# Patient Record
Sex: MALE | Race: WHITE | NOT HISPANIC OR LATINO | Employment: UNEMPLOYED | URBAN - METROPOLITAN AREA
[De-identification: names, ages, dates, MRNs, and addresses within clinical notes are randomized per-mention and may not be internally consistent; named-entity substitution may affect disease eponyms.]

---

## 2019-08-20 ENCOUNTER — HOSPITAL ENCOUNTER (EMERGENCY)
Facility: HOSPITAL | Age: 49
Discharge: LEFT BEFORE DISCHARGE | End: 2019-08-20
Attending: EMERGENCY MEDICINE | Admitting: EMERGENCY MEDICINE

## 2019-08-20 ENCOUNTER — APPOINTMENT (OUTPATIENT)
Dept: GENERAL RADIOLOGY | Facility: HOSPITAL | Age: 49
End: 2019-08-20

## 2019-08-20 VITALS
WEIGHT: 220 LBS | DIASTOLIC BLOOD PRESSURE: 83 MMHG | BODY MASS INDEX: 27.35 KG/M2 | TEMPERATURE: 98.7 F | RESPIRATION RATE: 18 BRPM | HEIGHT: 75 IN | SYSTOLIC BLOOD PRESSURE: 139 MMHG | OXYGEN SATURATION: 96 % | HEART RATE: 119 BPM

## 2019-08-20 DIAGNOSIS — M54.40 LOW BACK PAIN WITH SCIATICA, SCIATICA LATERALITY UNSPECIFIED, UNSPECIFIED BACK PAIN LATERALITY, UNSPECIFIED CHRONICITY: Primary | ICD-10-CM

## 2019-08-20 DIAGNOSIS — F10.10 ETOH ABUSE: ICD-10-CM

## 2019-08-20 LAB
ALBUMIN SERPL-MCNC: 5 G/DL (ref 3.5–5.2)
ALBUMIN/GLOB SERPL: 1.9 G/DL
ALP SERPL-CCNC: 91 U/L (ref 39–117)
ALT SERPL W P-5'-P-CCNC: 22 U/L (ref 1–41)
ANION GAP SERPL CALCULATED.3IONS-SCNC: 18 MMOL/L (ref 5–15)
AST SERPL-CCNC: 33 U/L (ref 1–40)
BASOPHILS # BLD AUTO: 0.04 10*3/MM3 (ref 0–0.2)
BASOPHILS NFR BLD AUTO: 0.3 % (ref 0–1.5)
BILIRUB SERPL-MCNC: 1 MG/DL (ref 0.2–1.2)
BUN BLD-MCNC: 15 MG/DL (ref 6–20)
BUN/CREAT SERPL: 15.2 (ref 7–25)
CALCIUM SPEC-SCNC: 9.3 MG/DL (ref 8.6–10.5)
CHLORIDE SERPL-SCNC: 97 MMOL/L (ref 98–107)
CO2 SERPL-SCNC: 25 MMOL/L (ref 22–29)
CREAT BLD-MCNC: 0.99 MG/DL (ref 0.76–1.27)
DEPRECATED RDW RBC AUTO: 38 FL (ref 37–54)
EOSINOPHIL # BLD AUTO: 0.29 10*3/MM3 (ref 0–0.4)
EOSINOPHIL NFR BLD AUTO: 2.5 % (ref 0.3–6.2)
ERYTHROCYTE [DISTWIDTH] IN BLOOD BY AUTOMATED COUNT: 12.1 % (ref 12.3–15.4)
ETHANOL BLD-MCNC: 220 MG/DL (ref 0–10)
GFR SERPL CREATININE-BSD FRML MDRD: 81 ML/MIN/1.73
GLOBULIN UR ELPH-MCNC: 2.7 GM/DL
GLUCOSE BLD-MCNC: 107 MG/DL (ref 65–99)
HCT VFR BLD AUTO: 45.4 % (ref 37.5–51)
HGB BLD-MCNC: 15.8 G/DL (ref 13–17.7)
HOLD SPECIMEN: NORMAL
HOLD SPECIMEN: NORMAL
IMM GRANULOCYTES # BLD AUTO: 0.06 10*3/MM3 (ref 0–0.05)
IMM GRANULOCYTES NFR BLD AUTO: 0.5 % (ref 0–0.5)
LYMPHOCYTES # BLD AUTO: 2.18 10*3/MM3 (ref 0.7–3.1)
LYMPHOCYTES NFR BLD AUTO: 18.7 % (ref 19.6–45.3)
MCH RBC QN AUTO: 30.4 PG (ref 26.6–33)
MCHC RBC AUTO-ENTMCNC: 34.8 G/DL (ref 31.5–35.7)
MCV RBC AUTO: 87.3 FL (ref 79–97)
MONOCYTES # BLD AUTO: 0.81 10*3/MM3 (ref 0.1–0.9)
MONOCYTES NFR BLD AUTO: 7 % (ref 5–12)
NEUTROPHILS # BLD AUTO: 8.25 10*3/MM3 (ref 1.7–7)
NEUTROPHILS NFR BLD AUTO: 71 % (ref 42.7–76)
NRBC BLD AUTO-RTO: 0 /100 WBC (ref 0–0.2)
PLATELET # BLD AUTO: 234 10*3/MM3 (ref 140–450)
PMV BLD AUTO: 9.3 FL (ref 6–12)
POTASSIUM BLD-SCNC: 3.4 MMOL/L (ref 3.5–5.2)
PROT SERPL-MCNC: 7.7 G/DL (ref 6–8.5)
RBC # BLD AUTO: 5.2 10*6/MM3 (ref 4.14–5.8)
SODIUM BLD-SCNC: 140 MMOL/L (ref 136–145)
WBC NRBC COR # BLD: 11.63 10*3/MM3 (ref 3.4–10.8)
WHOLE BLOOD HOLD SPECIMEN: NORMAL
WHOLE BLOOD HOLD SPECIMEN: NORMAL

## 2019-08-20 PROCEDURE — 80053 COMPREHEN METABOLIC PANEL: CPT | Performed by: EMERGENCY MEDICINE

## 2019-08-20 PROCEDURE — 99282 EMERGENCY DEPT VISIT SF MDM: CPT

## 2019-08-20 PROCEDURE — 85025 COMPLETE CBC W/AUTO DIFF WBC: CPT | Performed by: EMERGENCY MEDICINE

## 2019-08-20 PROCEDURE — 99283 EMERGENCY DEPT VISIT LOW MDM: CPT

## 2019-08-20 PROCEDURE — 80307 DRUG TEST PRSMV CHEM ANLYZR: CPT | Performed by: EMERGENCY MEDICINE

## 2019-08-20 PROCEDURE — 96360 HYDRATION IV INFUSION INIT: CPT

## 2019-08-20 RX ORDER — SODIUM CHLORIDE 0.9 % (FLUSH) 0.9 %
10 SYRINGE (ML) INJECTION AS NEEDED
Status: DISCONTINUED | OUTPATIENT
Start: 2019-08-20 | End: 2019-08-20 | Stop reason: HOSPADM

## 2019-08-20 RX ADMIN — SODIUM CHLORIDE 1000 ML: 9 INJECTION, SOLUTION INTRAVENOUS at 19:24

## 2019-08-20 NOTE — ED PROVIDER NOTES
"Subjective   Mr. Leonard Crandall is a 48 y.o. male who presents to the ED with c/o back pain. He reports for the past \"couple weeks\" he has been experiencing increased lower back pain. He states walking initially creates stiffness in his back that eventually turns into sharp pain. He also complains of vomiting 2-3 times yesterday and right lower quadrant abdominal pain he describes as cramping. He denies incontinence, numbness, diarrhea, and changes in urination. He suspects his symptoms are secondary to dehydration and notes drinking water makes him bloated. He has a history of fractured spine. He is a . He smokes and drinks up to 1 pint of alcohol per day. He has been drinking today. He does not use illegal drugs. There are no other acute complaints at this time.        History provided by:  Patient  Back Pain   Location:  Lumbar spine  Quality:  Stabbing  Radiates to:  Does not radiate  Pain severity:  Moderate  Timing:  Constant  Chronicity:  New  Context comment:  Previous fractures  Worsened by:  Ambulation  Associated symptoms: abdominal pain    Associated symptoms: no bladder incontinence, no bowel incontinence, no dysuria and no numbness    Risk factors: not obese        Review of Systems   Gastrointestinal: Positive for abdominal pain and vomiting. Negative for bowel incontinence and diarrhea.   Genitourinary: Negative for bladder incontinence, difficulty urinating, dysuria, frequency and urgency.   Musculoskeletal: Positive for back pain.   Neurological: Negative for numbness.   All other systems reviewed and are negative.      History reviewed. No pertinent past medical history.    No Known Allergies    History reviewed. No pertinent surgical history.    History reviewed. No pertinent family history.    Social History     Socioeconomic History   • Marital status: Single     Spouse name: Not on file   • Number of children: Not on file   • Years of education: Not on file   • Highest " education level: Not on file   Tobacco Use   • Smoking status: Current Every Day Smoker     Packs/day: 1.00     Types: Cigarettes   Substance and Sexual Activity   • Alcohol use: Yes     Comment: 1 PINT LIQUOR/DAY   • Drug use: No         Objective   Physical Exam   Constitutional: He is oriented to person, place, and time. He appears well-developed and well-nourished. He is cooperative.  Non-toxic appearance. No distress.   HENT:   Head: Normocephalic and atraumatic.   Eyes: Conjunctivae, EOM and lids are normal.   Neck: Trachea normal, normal range of motion and full passive range of motion without pain.   Cardiovascular: Regular rhythm, normal heart sounds, intact distal pulses and normal pulses.   Pulmonary/Chest: Effort normal and breath sounds normal. No respiratory distress. He has no decreased breath sounds. He has no wheezes. He has no rhonchi. He has no rales.   Abdominal: Soft. Normal appearance and bowel sounds are normal. He exhibits no distension. There is no tenderness.   Musculoskeletal: Normal range of motion.        Lumbar back: He exhibits tenderness (paraspinal tenderness, no midline tenderness). He exhibits normal range of motion.   Neurological: He is alert and oriented to person, place, and time. He has normal strength. No cranial nerve deficit.   Skin: Skin is warm, dry and intact. No rash noted.   Psychiatric: He has a normal mood and affect. His speech is normal and behavior is normal.   Nursing note and vitals reviewed.      Procedures         ED Course  ED Course as of Aug 22 0500   Tue Aug 20, 2019   2100 Patient advises that he is feeling better after the fluids.  Patient reports he wants to leave at this time.  He tells the nurse that he cannot afford this visit and now does not want the x-rays.  Patient is going leave at this time.  He is going to  some ibuprofen on his way home.  [KG]      ED Course User Index  [KG] Cherelle Starkey, APRN       No results found for this or  "any previous visit (from the past 24 hour(s)).  Note: In addition to lab results from this visit, the labs listed above may include labs taken at another facility or during a different encounter within the last 24 hours. Please correlate lab times with ED admission and discharge times for further clarification of the services performed during this visit.    No orders to display     Vitals:    08/20/19 1720   BP: 139/83   BP Location: Left arm   Patient Position: Sitting   Pulse: 119   Resp: 18   Temp: 98.7 °F (37.1 °C)   TempSrc: Oral   SpO2: 96%   Weight: 99.8 kg (220 lb)   Height: 190.5 cm (75\")     Medications   sodium chloride 0.9 % bolus 1,000 mL (0 mL Intravenous Stopped 8/20/19 2022)     ECG/EMG Results (last 24 hours)     ** No results found for the last 24 hours. **        No orders to display                     MDM      Final diagnoses:   Low back pain with sciatica, sciatica laterality unspecified, unspecified back pain laterality, unspecified chronicity   ETOH abuse       Documentation assistance provided by maged Zaidi.  Information recorded by the maged was done at my direction and has been verified and validated by me.     Jenny Zaidi  08/20/19 1940       Cherelle Starkey APRN  08/22/19 2119    "

## 2022-01-20 ENCOUNTER — HOSPITAL ENCOUNTER (INPATIENT)
Facility: HOSPITAL | Age: 52
LOS: 2 days | Discharge: LEFT BEFORE DISCHARGE | End: 2022-01-22
Attending: EMERGENCY MEDICINE | Admitting: INTERNAL MEDICINE

## 2022-01-20 ENCOUNTER — APPOINTMENT (OUTPATIENT)
Dept: CT IMAGING | Facility: HOSPITAL | Age: 52
End: 2022-01-20

## 2022-01-20 ENCOUNTER — APPOINTMENT (OUTPATIENT)
Dept: GENERAL RADIOLOGY | Facility: HOSPITAL | Age: 52
End: 2022-01-20

## 2022-01-20 DIAGNOSIS — A41.9 SEPSIS WITHOUT ACUTE ORGAN DYSFUNCTION, DUE TO UNSPECIFIED ORGANISM: Primary | ICD-10-CM

## 2022-01-20 DIAGNOSIS — J96.01 ACUTE RESPIRATORY FAILURE WITH HYPOXIA: ICD-10-CM

## 2022-01-20 DIAGNOSIS — R41.82 ALTERED MENTAL STATUS, UNSPECIFIED ALTERED MENTAL STATUS TYPE: ICD-10-CM

## 2022-01-20 DIAGNOSIS — F10.920 ALCOHOLIC INTOXICATION WITHOUT COMPLICATION: ICD-10-CM

## 2022-01-20 PROBLEM — J18.9 SEPSIS DUE TO PNEUMONIA (HCC): Status: ACTIVE | Noted: 2022-01-20

## 2022-01-20 PROBLEM — J44.9 COPD (CHRONIC OBSTRUCTIVE PULMONARY DISEASE) (HCC): Status: ACTIVE | Noted: 2022-01-20

## 2022-01-20 PROBLEM — M54.9 CHRONIC BACK PAIN: Status: ACTIVE | Noted: 2022-01-20

## 2022-01-20 PROBLEM — R74.8 ELEVATED LIVER ENZYMES: Status: ACTIVE | Noted: 2022-01-20

## 2022-01-20 PROBLEM — Z72.0 TOBACCO ABUSE: Status: ACTIVE | Noted: 2022-01-20

## 2022-01-20 PROBLEM — E87.20 LACTIC ACIDOSIS: Status: ACTIVE | Noted: 2022-01-20

## 2022-01-20 PROBLEM — G89.29 CHRONIC BACK PAIN: Status: ACTIVE | Noted: 2022-01-20

## 2022-01-20 PROBLEM — F10.20 ALCOHOLISM: Status: ACTIVE | Noted: 2022-01-20

## 2022-01-20 LAB
ALBUMIN SERPL-MCNC: 4.8 G/DL (ref 3.5–5.2)
ALBUMIN/GLOB SERPL: 1.7 G/DL
ALP SERPL-CCNC: 61 U/L (ref 39–117)
ALT SERPL W P-5'-P-CCNC: 46 U/L (ref 1–41)
ANION GAP SERPL CALCULATED.3IONS-SCNC: 19 MMOL/L (ref 5–15)
APAP SERPL-MCNC: <5 MCG/ML (ref 0–30)
AST SERPL-CCNC: 83 U/L (ref 1–40)
BASOPHILS # BLD AUTO: 0.04 10*3/MM3 (ref 0–0.2)
BASOPHILS NFR BLD AUTO: 0.3 % (ref 0–1.5)
BILIRUB SERPL-MCNC: 0.4 MG/DL (ref 0–1.2)
BUN SERPL-MCNC: 22 MG/DL (ref 6–20)
BUN/CREAT SERPL: 18 (ref 7–25)
CALCIUM SPEC-SCNC: 9 MG/DL (ref 8.6–10.5)
CHLORIDE SERPL-SCNC: 99 MMOL/L (ref 98–107)
CO2 SERPL-SCNC: 23 MMOL/L (ref 22–29)
CREAT SERPL-MCNC: 1.22 MG/DL (ref 0.76–1.27)
D-LACTATE SERPL-SCNC: 3.5 MMOL/L (ref 0.5–2)
D-LACTATE SERPL-SCNC: 3.9 MMOL/L (ref 0.5–2)
DEPRECATED RDW RBC AUTO: 45.4 FL (ref 37–54)
EOSINOPHIL # BLD AUTO: 0.04 10*3/MM3 (ref 0–0.4)
EOSINOPHIL NFR BLD AUTO: 0.3 % (ref 0.3–6.2)
ERYTHROCYTE [DISTWIDTH] IN BLOOD BY AUTOMATED COUNT: 13.1 % (ref 12.3–15.4)
ETHANOL BLD-MCNC: 285 MG/DL (ref 0–10)
FLUAV RNA RESP QL NAA+PROBE: NOT DETECTED
FLUBV RNA RESP QL NAA+PROBE: NOT DETECTED
GFR SERPL CREATININE-BSD FRML MDRD: 63 ML/MIN/1.73
GLOBULIN UR ELPH-MCNC: 2.9 GM/DL
GLUCOSE SERPL-MCNC: 71 MG/DL (ref 65–99)
HAV IGM SERPL QL IA: NORMAL
HBV CORE IGM SERPL QL IA: NORMAL
HBV SURFACE AG SERPL QL IA: NORMAL
HCT VFR BLD AUTO: 51.6 % (ref 37.5–51)
HCV AB SER DONR QL: NORMAL
HGB BLD-MCNC: 17.1 G/DL (ref 13–17.7)
IMM GRANULOCYTES # BLD AUTO: 0.11 10*3/MM3 (ref 0–0.05)
IMM GRANULOCYTES NFR BLD AUTO: 0.8 % (ref 0–0.5)
LYMPHOCYTES # BLD AUTO: 2.91 10*3/MM3 (ref 0.7–3.1)
LYMPHOCYTES NFR BLD AUTO: 20.2 % (ref 19.6–45.3)
MCH RBC QN AUTO: 31.1 PG (ref 26.6–33)
MCHC RBC AUTO-ENTMCNC: 33.1 G/DL (ref 31.5–35.7)
MCV RBC AUTO: 93.8 FL (ref 79–97)
MONOCYTES # BLD AUTO: 1.03 10*3/MM3 (ref 0.1–0.9)
MONOCYTES NFR BLD AUTO: 7.1 % (ref 5–12)
NEUTROPHILS NFR BLD AUTO: 10.31 10*3/MM3 (ref 1.7–7)
NEUTROPHILS NFR BLD AUTO: 71.3 % (ref 42.7–76)
NRBC BLD AUTO-RTO: 0 /100 WBC (ref 0–0.2)
NT-PROBNP SERPL-MCNC: 28 PG/ML (ref 0–450)
PLATELET # BLD AUTO: 274 10*3/MM3 (ref 140–450)
PMV BLD AUTO: 9.3 FL (ref 6–12)
POTASSIUM SERPL-SCNC: 5 MMOL/L (ref 3.5–5.2)
PROCALCITONIN SERPL-MCNC: 0.26 NG/ML (ref 0–0.25)
PROT SERPL-MCNC: 7.7 G/DL (ref 6–8.5)
RBC # BLD AUTO: 5.5 10*6/MM3 (ref 4.14–5.8)
RSV RNA NPH QL NAA+NON-PROBE: NOT DETECTED
SALICYLATES SERPL-MCNC: <0.3 MG/DL
SARS-COV-2 RNA RESP QL NAA+PROBE: NOT DETECTED
SODIUM SERPL-SCNC: 141 MMOL/L (ref 136–145)
TROPONIN T SERPL-MCNC: <0.01 NG/ML (ref 0–0.03)
WBC NRBC COR # BLD: 14.44 10*3/MM3 (ref 3.4–10.8)

## 2022-01-20 PROCEDURE — 36415 COLL VENOUS BLD VENIPUNCTURE: CPT

## 2022-01-20 PROCEDURE — 80053 COMPREHEN METABOLIC PANEL: CPT | Performed by: EMERGENCY MEDICINE

## 2022-01-20 PROCEDURE — 70450 CT HEAD/BRAIN W/O DYE: CPT

## 2022-01-20 PROCEDURE — 83605 ASSAY OF LACTIC ACID: CPT | Performed by: EMERGENCY MEDICINE

## 2022-01-20 PROCEDURE — 99284 EMERGENCY DEPT VISIT MOD MDM: CPT

## 2022-01-20 PROCEDURE — 94640 AIRWAY INHALATION TREATMENT: CPT

## 2022-01-20 PROCEDURE — 71045 X-RAY EXAM CHEST 1 VIEW: CPT

## 2022-01-20 PROCEDURE — 80143 DRUG ASSAY ACETAMINOPHEN: CPT | Performed by: EMERGENCY MEDICINE

## 2022-01-20 PROCEDURE — 84484 ASSAY OF TROPONIN QUANT: CPT | Performed by: EMERGENCY MEDICINE

## 2022-01-20 PROCEDURE — 93005 ELECTROCARDIOGRAM TRACING: CPT | Performed by: EMERGENCY MEDICINE

## 2022-01-20 PROCEDURE — 25010000002 VANCOMYCIN 10 G RECONSTITUTED SOLUTION: Performed by: EMERGENCY MEDICINE

## 2022-01-20 PROCEDURE — 83880 ASSAY OF NATRIURETIC PEPTIDE: CPT | Performed by: EMERGENCY MEDICINE

## 2022-01-20 PROCEDURE — 87040 BLOOD CULTURE FOR BACTERIA: CPT | Performed by: EMERGENCY MEDICINE

## 2022-01-20 PROCEDURE — 25010000002 LORAZEPAM PER 2 MG: Performed by: INTERNAL MEDICINE

## 2022-01-20 PROCEDURE — 80179 DRUG ASSAY SALICYLATE: CPT | Performed by: EMERGENCY MEDICINE

## 2022-01-20 PROCEDURE — 82077 ASSAY SPEC XCP UR&BREATH IA: CPT | Performed by: EMERGENCY MEDICINE

## 2022-01-20 PROCEDURE — 84145 PROCALCITONIN (PCT): CPT | Performed by: EMERGENCY MEDICINE

## 2022-01-20 PROCEDURE — 87637 SARSCOV2&INF A&B&RSV AMP PRB: CPT | Performed by: EMERGENCY MEDICINE

## 2022-01-20 PROCEDURE — 85025 COMPLETE CBC W/AUTO DIFF WBC: CPT | Performed by: EMERGENCY MEDICINE

## 2022-01-20 PROCEDURE — 87076 CULTURE ANAEROBE IDENT EACH: CPT | Performed by: EMERGENCY MEDICINE

## 2022-01-20 PROCEDURE — 94799 UNLISTED PULMONARY SVC/PX: CPT

## 2022-01-20 PROCEDURE — 25010000002 PIPERACILLIN SOD-TAZOBACTAM PER 1 G: Performed by: EMERGENCY MEDICINE

## 2022-01-20 PROCEDURE — 25010000002 THIAMINE PER 100 MG: Performed by: INTERNAL MEDICINE

## 2022-01-20 PROCEDURE — 80074 ACUTE HEPATITIS PANEL: CPT | Performed by: PHYSICIAN ASSISTANT

## 2022-01-20 PROCEDURE — 99223 1ST HOSP IP/OBS HIGH 75: CPT | Performed by: INTERNAL MEDICINE

## 2022-01-20 RX ORDER — LORAZEPAM 1 MG/1
2 TABLET ORAL
Status: DISCONTINUED | OUTPATIENT
Start: 2022-01-20 | End: 2022-01-22 | Stop reason: HOSPADM

## 2022-01-20 RX ORDER — SODIUM CHLORIDE, SODIUM LACTATE, POTASSIUM CHLORIDE, CALCIUM CHLORIDE 600; 310; 30; 20 MG/100ML; MG/100ML; MG/100ML; MG/100ML
100 INJECTION, SOLUTION INTRAVENOUS CONTINUOUS
Status: ACTIVE | OUTPATIENT
Start: 2022-01-20 | End: 2022-01-21

## 2022-01-20 RX ORDER — NICOTINE 21 MG/24HR
1 PATCH, TRANSDERMAL 24 HOURS TRANSDERMAL ONCE
Status: COMPLETED | OUTPATIENT
Start: 2022-01-20 | End: 2022-01-21

## 2022-01-20 RX ORDER — LORAZEPAM 1 MG/1
1 TABLET ORAL
Status: DISCONTINUED | OUTPATIENT
Start: 2022-01-20 | End: 2022-01-22 | Stop reason: HOSPADM

## 2022-01-20 RX ORDER — SODIUM CHLORIDE 0.9 % (FLUSH) 0.9 %
10 SYRINGE (ML) INJECTION AS NEEDED
Status: DISCONTINUED | OUTPATIENT
Start: 2022-01-20 | End: 2022-01-22 | Stop reason: HOSPADM

## 2022-01-20 RX ORDER — SODIUM CHLORIDE 0.9 % (FLUSH) 0.9 %
10 SYRINGE (ML) INJECTION EVERY 12 HOURS SCHEDULED
Status: DISCONTINUED | OUTPATIENT
Start: 2022-01-20 | End: 2022-01-22 | Stop reason: HOSPADM

## 2022-01-20 RX ORDER — LORAZEPAM 2 MG/ML
1 INJECTION INTRAMUSCULAR EVERY 8 HOURS
Status: DISCONTINUED | OUTPATIENT
Start: 2022-01-21 | End: 2022-01-22 | Stop reason: HOSPADM

## 2022-01-20 RX ORDER — NICOTINE 21 MG/24HR
1 PATCH, TRANSDERMAL 24 HOURS TRANSDERMAL EVERY 24 HOURS
Status: DISCONTINUED | OUTPATIENT
Start: 2022-01-21 | End: 2022-01-22 | Stop reason: HOSPADM

## 2022-01-20 RX ORDER — LORAZEPAM 2 MG/ML
2 INJECTION INTRAMUSCULAR
Status: DISCONTINUED | OUTPATIENT
Start: 2022-01-20 | End: 2022-01-22 | Stop reason: HOSPADM

## 2022-01-20 RX ORDER — LORAZEPAM 2 MG/ML
1 INJECTION INTRAMUSCULAR
Status: DISCONTINUED | OUTPATIENT
Start: 2022-01-20 | End: 2022-01-22 | Stop reason: HOSPADM

## 2022-01-20 RX ORDER — LORAZEPAM 2 MG/ML
1 INJECTION INTRAMUSCULAR EVERY 6 HOURS
Status: COMPLETED | OUTPATIENT
Start: 2022-01-20 | End: 2022-01-21

## 2022-01-20 RX ORDER — ALBUTEROL SULFATE 2.5 MG/3ML
2.5 SOLUTION RESPIRATORY (INHALATION)
Status: DISCONTINUED | OUTPATIENT
Start: 2022-01-20 | End: 2022-01-22

## 2022-01-20 RX ORDER — CHOLECALCIFEROL (VITAMIN D3) 125 MCG
5 CAPSULE ORAL NIGHTLY PRN
Status: DISCONTINUED | OUTPATIENT
Start: 2022-01-20 | End: 2022-01-22 | Stop reason: HOSPADM

## 2022-01-20 RX ADMIN — SODIUM CHLORIDE 1000 ML: 9 INJECTION, SOLUTION INTRAVENOUS at 19:54

## 2022-01-20 RX ADMIN — SODIUM CHLORIDE, PRESERVATIVE FREE 10 ML: 5 INJECTION INTRAVENOUS at 21:57

## 2022-01-20 RX ADMIN — TAZOBACTAM SODIUM AND PIPERACILLIN SODIUM 3.38 G: 375; 3 INJECTION, SOLUTION INTRAVENOUS at 19:54

## 2022-01-20 RX ADMIN — FOLIC ACID 100 ML/HR: 5 INJECTION, SOLUTION INTRAMUSCULAR; INTRAVENOUS; SUBCUTANEOUS at 22:51

## 2022-01-20 RX ADMIN — Medication 1 PATCH: at 21:54

## 2022-01-20 RX ADMIN — ALBUTEROL SULFATE 2.5 MG: 2.5 SOLUTION RESPIRATORY (INHALATION) at 23:56

## 2022-01-20 RX ADMIN — LORAZEPAM 1 MG: 2 INJECTION, SOLUTION INTRAMUSCULAR; INTRAVENOUS at 21:54

## 2022-01-20 RX ADMIN — VANCOMYCIN HYDROCHLORIDE 2000 MG: 10 INJECTION, POWDER, LYOPHILIZED, FOR SOLUTION INTRAVENOUS at 20:33

## 2022-01-20 NOTE — ED PROVIDER NOTES
Subjective   Mr. Crandall is brought by EMS from [employer].  The manager there reported that he appeared intoxicated and was loitering.  He reported to EMS that his back pain has been worse than usual lately.  He admitted to them that he was has been drinking alcohol.  On my exam he is very lethargic and is not able to provide any additional history.  I can see indications history record that he has been at [facility] several times over the last couple of years.  He is reported to have alcohol problems, COPD, hypomagnesemia and low back pain.      History provided by:  EMS personnel and medical records  History limited by:  Mental status change  Altered Mental Status  Presenting symptoms: lethargy    Severity:  Severe  Most recent episode:  Today  Episode history:  Single  Timing:  Constant  Progression:  Worsening  Chronicity:  New  Context: alcohol use and homelessness        Review of Systems   Unable to perform ROS: Mental status change       No past medical history on file.    No Known Allergies    No past surgical history on file.    No family history on file.    Social History     Socioeconomic History   • Marital status: Single   Tobacco Use   • Smoking status: Current Every Day Smoker     Packs/day: 1.00     Types: Cigarettes   Substance and Sexual Activity   • Alcohol use: Yes     Comment: 1 PINT LIQUOR/DAY   • Drug use: No           Objective   Physical Exam  Vitals and nursing note reviewed.   Constitutional:       General: He is not in acute distress.     Comments: He is somnolent on my exam.  He does not awaken to verbal stimuli.  He does briefly start with sternal rub.  He mumbles and then quickly goes back to sleep.  He smells of a wood fire.  He is noted to move all extremities at various times during exam but is not able to follow commands.  Saturations are 89% on 2 L of oxygen.   HENT:      Head: Normocephalic and atraumatic.      Nose: Nose normal. No congestion or  rhinorrhea.      Mouth/Throat:      Mouth: Mucous membranes are dry.   Eyes:      General: No scleral icterus.     Conjunctiva/sclera: Conjunctivae normal.   Neck:      Comments: No JVD  Cardiovascular:      Rate and Rhythm: Normal rate and regular rhythm.      Heart sounds: Normal heart sounds. No murmur heard.  No friction rub.   Pulmonary:      Effort: Pulmonary effort is normal.      Breath sounds: Normal breath sounds. No wheezing or rales.   Abdominal:      General: Bowel sounds are normal. There is no distension.      Palpations: Abdomen is soft.      Tenderness: There is no abdominal tenderness.   Musculoskeletal:         General: No tenderness.      Cervical back: Normal range of motion and neck supple.      Right lower leg: No edema.      Left lower leg: No edema.   Skin:     General: Skin is dry.      Capillary Refill: Capillary refill takes less than 2 seconds.      Coloration: Skin is not pale.      Comments: Extremities overall cool to touch   Neurological:      General: No focal deficit present.      Comments: He is unable to follow commands.  He is noted during exam to move all extremities at different times.  For example he is holding the TV remote in his hand   Psychiatric:      Comments: Unable to assess         Procedures           ED Course  ED Course as of 01/20/22 1956   Thu Jan 20, 2022 1803 Staff reports that he is belligerent and combative when I try to check his temperature or do any other interventions.  I do not think he is able to refuse treatment at this point.  We will need to restrain him if necessary to obtain medical evaluation. [DT]   1808 Repeat exam now police are at bedside.  Mr. Crandall is sitting up in bed.  He is able to answer questions.  He denies any cough, fever, or recent upper respiratory illnesses. [DT]   1857 White blood cell count is elevated, lactic acid as well as procalcitonin levels are also elevated.  Will give sepsis antibiotics. [DT]   1858 I have reviewed  his chest x-ray as well as the radiologist interpretation.  I think Mr. Crandall has pneumonia given his low oxygen saturations and elevated white blood cell count and septic markers.  Have ordered IV antibiotics.  COVID swab is negative.  We will plan on admission to the hospital. [DT]   1910 Spoke with Mr. Crandall and advised him of findings.  He does acknowledge that he has been coughing.  He has eaten a dinner tray but remains drowsy. [DT]   1955 D/w Dr Seals who will admit. [DT]      ED Course User Index  [DT] Godfrey Fairbanks MD                                                 MDM  Number of Diagnoses or Management Options  Acute respiratory failure with hypoxia (HCC): new and requires workup  Alcoholic intoxication without complication (HCC): new and does not require workup  Altered mental status, unspecified altered mental status type: new and requires workup  Sepsis without acute organ dysfunction, due to unspecified organism (HCC): new and requires workup     Amount and/or Complexity of Data Reviewed  Clinical lab tests: reviewed and ordered  Tests in the radiology section of CPT®: ordered and reviewed  Obtain history from someone other than the patient: yes  Review and summarize past medical records: yes  Discuss the patient with other providers: yes  Independent visualization of images, tracings, or specimens: yes    Patient Progress  Patient progress: improved      Final diagnoses:   Altered mental status, unspecified altered mental status type   Sepsis without acute organ dysfunction, due to unspecified organism (HCC)   Acute respiratory failure with hypoxia (HCC)   Alcoholic intoxication without complication (HCC)       ED Disposition  ED Disposition     ED Disposition Condition Comment    Decision to Admit  Level of Care: Telemetry [5]   Diagnosis: Sepsis without acute organ dysfunction, due to unspecified organism (HCC) [[id number]]            No follow-up provider specified.       Medication List      No  changes were made to your prescriptions during this visit.          Godfrey Fairbanks MD  01/20/22 1956

## 2022-01-21 PROBLEM — A41.9 SEPSIS WITHOUT ACUTE ORGAN DYSFUNCTION: Status: ACTIVE | Noted: 2022-01-21

## 2022-01-21 LAB
ALBUMIN SERPL-MCNC: 3.9 G/DL (ref 3.5–5.2)
ALBUMIN/GLOB SERPL: 2.3 G/DL
ALP SERPL-CCNC: 49 U/L (ref 39–117)
ALT SERPL W P-5'-P-CCNC: 34 U/L (ref 1–41)
AMPHET+METHAMPHET UR QL: NEGATIVE
AMPHETAMINES UR QL: NEGATIVE
ANION GAP SERPL CALCULATED.3IONS-SCNC: 11 MMOL/L (ref 5–15)
AST SERPL-CCNC: 49 U/L (ref 1–40)
BARBITURATES UR QL SCN: NEGATIVE
BENZODIAZ UR QL SCN: NEGATIVE
BILIRUB SERPL-MCNC: 0.6 MG/DL (ref 0–1.2)
BUN SERPL-MCNC: 21 MG/DL (ref 6–20)
BUN/CREAT SERPL: 24.7 (ref 7–25)
BUPRENORPHINE SERPL-MCNC: NEGATIVE NG/ML
CALCIUM SPEC-SCNC: 8.3 MG/DL (ref 8.6–10.5)
CANNABINOIDS SERPL QL: NEGATIVE
CHLORIDE SERPL-SCNC: 107 MMOL/L (ref 98–107)
CO2 SERPL-SCNC: 23 MMOL/L (ref 22–29)
COCAINE UR QL: NEGATIVE
CREAT SERPL-MCNC: 0.85 MG/DL (ref 0.76–1.27)
DEPRECATED RDW RBC AUTO: 44.8 FL (ref 37–54)
ERYTHROCYTE [DISTWIDTH] IN BLOOD BY AUTOMATED COUNT: 13.1 % (ref 12.3–15.4)
GFR SERPL CREATININE-BSD FRML MDRD: 96 ML/MIN/1.73
GLOBULIN UR ELPH-MCNC: 1.7 GM/DL
GLUCOSE SERPL-MCNC: 98 MG/DL (ref 65–99)
HCT VFR BLD AUTO: 41.9 % (ref 37.5–51)
HGB BLD-MCNC: 14.1 G/DL (ref 13–17.7)
L PNEUMO1 AG UR QL IA: NEGATIVE
MCH RBC QN AUTO: 31.3 PG (ref 26.6–33)
MCHC RBC AUTO-ENTMCNC: 33.7 G/DL (ref 31.5–35.7)
MCV RBC AUTO: 93.1 FL (ref 79–97)
METHADONE UR QL SCN: NEGATIVE
MRSA DNA SPEC QL NAA+PROBE: NEGATIVE
OPIATES UR QL: NEGATIVE
OXYCODONE UR QL SCN: NEGATIVE
PCP UR QL SCN: NEGATIVE
PLATELET # BLD AUTO: 231 10*3/MM3 (ref 140–450)
PMV BLD AUTO: 9.3 FL (ref 6–12)
POTASSIUM SERPL-SCNC: 4.3 MMOL/L (ref 3.5–5.2)
PROPOXYPH UR QL: NEGATIVE
PROT SERPL-MCNC: 5.6 G/DL (ref 6–8.5)
RBC # BLD AUTO: 4.5 10*6/MM3 (ref 4.14–5.8)
S PNEUM AG SPEC QL LA: NEGATIVE
SODIUM SERPL-SCNC: 141 MMOL/L (ref 136–145)
TRICYCLICS UR QL SCN: NEGATIVE
WBC NRBC COR # BLD: 11.17 10*3/MM3 (ref 3.4–10.8)

## 2022-01-21 PROCEDURE — 25010000002 LORAZEPAM PER 2 MG: Performed by: INTERNAL MEDICINE

## 2022-01-21 PROCEDURE — 85027 COMPLETE CBC AUTOMATED: CPT | Performed by: PHYSICIAN ASSISTANT

## 2022-01-21 PROCEDURE — 25010000002 PIPERACILLIN SOD-TAZOBACTAM PER 1 G: Performed by: PHYSICIAN ASSISTANT

## 2022-01-21 PROCEDURE — 94799 UNLISTED PULMONARY SVC/PX: CPT

## 2022-01-21 PROCEDURE — 99232 SBSQ HOSP IP/OBS MODERATE 35: CPT | Performed by: INTERNAL MEDICINE

## 2022-01-21 PROCEDURE — 80306 DRUG TEST PRSMV INSTRMNT: CPT | Performed by: EMERGENCY MEDICINE

## 2022-01-21 PROCEDURE — 80053 COMPREHEN METABOLIC PANEL: CPT | Performed by: PHYSICIAN ASSISTANT

## 2022-01-21 PROCEDURE — 25010000002 ENOXAPARIN PER 10 MG: Performed by: PHYSICIAN ASSISTANT

## 2022-01-21 PROCEDURE — 87899 AGENT NOS ASSAY W/OPTIC: CPT | Performed by: PHYSICIAN ASSISTANT

## 2022-01-21 PROCEDURE — 87641 MR-STAPH DNA AMP PROBE: CPT | Performed by: PHYSICIAN ASSISTANT

## 2022-01-21 PROCEDURE — 25010000002 VANCOMYCIN 10 G RECONSTITUTED SOLUTION

## 2022-01-21 RX ORDER — SODIUM CHLORIDE, SODIUM LACTATE, POTASSIUM CHLORIDE, CALCIUM CHLORIDE 600; 310; 30; 20 MG/100ML; MG/100ML; MG/100ML; MG/100ML
100 INJECTION, SOLUTION INTRAVENOUS CONTINUOUS
Status: ACTIVE | OUTPATIENT
Start: 2022-01-21 | End: 2022-01-22

## 2022-01-21 RX ORDER — FOLIC ACID 1 MG/1
1 TABLET ORAL DAILY
Status: DISCONTINUED | OUTPATIENT
Start: 2022-01-21 | End: 2022-01-22 | Stop reason: HOSPADM

## 2022-01-21 RX ADMIN — LORAZEPAM 1 MG: 2 INJECTION, SOLUTION INTRAMUSCULAR; INTRAVENOUS at 09:34

## 2022-01-21 RX ADMIN — SODIUM CHLORIDE, PRESERVATIVE FREE 10 ML: 5 INJECTION INTRAVENOUS at 20:55

## 2022-01-21 RX ADMIN — TAZOBACTAM SODIUM AND PIPERACILLIN SODIUM 3.38 G: 375; 3 INJECTION, SOLUTION INTRAVENOUS at 09:22

## 2022-01-21 RX ADMIN — Medication 1 PATCH: at 09:22

## 2022-01-21 RX ADMIN — THIAMINE HCL TAB 100 MG 100 MG: 100 TAB at 17:59

## 2022-01-21 RX ADMIN — FOLIC ACID 1 MG: 1 TABLET ORAL at 17:59

## 2022-01-21 RX ADMIN — LORAZEPAM 1 MG: 2 INJECTION INTRAMUSCULAR; INTRAVENOUS at 20:55

## 2022-01-21 RX ADMIN — ALBUTEROL SULFATE 2.5 MG: 2.5 SOLUTION RESPIRATORY (INHALATION) at 16:03

## 2022-01-21 RX ADMIN — SODIUM CHLORIDE, PRESERVATIVE FREE 10 ML: 5 INJECTION INTRAVENOUS at 09:23

## 2022-01-21 RX ADMIN — SODIUM CHLORIDE, POTASSIUM CHLORIDE, SODIUM LACTATE AND CALCIUM CHLORIDE 100 ML/HR: 600; 310; 30; 20 INJECTION, SOLUTION INTRAVENOUS at 15:25

## 2022-01-21 RX ADMIN — LORAZEPAM 1 MG: 2 INJECTION, SOLUTION INTRAMUSCULAR; INTRAVENOUS at 15:25

## 2022-01-21 RX ADMIN — LORAZEPAM 1 MG: 2 INJECTION, SOLUTION INTRAMUSCULAR; INTRAVENOUS at 03:03

## 2022-01-21 RX ADMIN — ENOXAPARIN SODIUM 40 MG: 40 INJECTION SUBCUTANEOUS at 05:02

## 2022-01-21 RX ADMIN — ALBUTEROL SULFATE 2.5 MG: 2.5 SOLUTION RESPIRATORY (INHALATION) at 12:21

## 2022-01-21 RX ADMIN — ALBUTEROL SULFATE 2.5 MG: 2.5 SOLUTION RESPIRATORY (INHALATION) at 07:30

## 2022-01-21 RX ADMIN — ALBUTEROL SULFATE 2.5 MG: 2.5 SOLUTION RESPIRATORY (INHALATION) at 20:36

## 2022-01-21 RX ADMIN — VANCOMYCIN HYDROCHLORIDE 1250 MG: 10 INJECTION, POWDER, LYOPHILIZED, FOR SOLUTION INTRAVENOUS at 05:23

## 2022-01-21 RX ADMIN — TAZOBACTAM SODIUM AND PIPERACILLIN SODIUM 3.38 G: 375; 3 INJECTION, SOLUTION INTRAVENOUS at 02:05

## 2022-01-21 RX ADMIN — TAZOBACTAM SODIUM AND PIPERACILLIN SODIUM 3.38 G: 375; 3 INJECTION, SOLUTION INTRAVENOUS at 17:59

## 2022-01-21 NOTE — H&P
[facility] Medicine Services  HISTORY AND PHYSICAL    Patient Name: Leonard Crandall  : 1970  MRN: [id number]  Primary Care Physician: Provider, No Known  Date of admission: 2022    Subjective   Subjective     Chief Complaint:  AMS/intoxication    HPI:  Leonard Crandall is a 51 y.o. male with a past medical history significant for COPD, chronic back pain, alcoholism, and tobacco abuse. He presents today with AMS likely due to alcohol intoxication. HPI limited secondary to patient disposition. He is disoriented, aggressive, and combative. Currently refusing to awake and answer questions. Threatening staff. Records reviewed indicate EMS was called to a "[employer] station. The manager of the store had a confrontation with the patient about loitering and realized that he was confused and intoxicated. Upon arrival, patient complained to EMS about 10/10 chronic lower back pain and stated that he needed to come to the hospital. Further review of records from the [facility] indicate that patient is seen recurrently in ED related to sequelae of alcohol intoxication.    Currently there are no complaints or reports of fever, cough, congestion, SOB, or chest pain. No headache or focal weakness/parathesias. No falls or head trauma. No abdominal pain or N/V/D. Will admit to inpatient for further evaluation and treatment.      COVID Details:    Symptoms:    [x] NONE [] Fever []  Cough [] Shortness of breath [] Change in taste/smell      Review of Systems   Unable to perform ROS: Mental status change        All other systems reviewed and are negative.     Personal History     Past Medical History:   Diagnosis Date   • Alcoholism ([facility]) 2022   • COPD (chronic obstructive pulmonary disease) ([facility]) 2022   • Tobacco abuse 2022       No past surgical history on file.    Family History: family history is not on file. Otherwise pertinent FHx was reviewed and unremarkable.      Social History:  reports that he has been smoking cigarettes. He has been smoking about 1.00 pack per day. He does not have any smokeless tobacco history on file. He reports current alcohol use. He reports that he does not use drugs.  Social History     Social History Narrative   • Not on file       Medications:       No Known Allergies    Objective   Objective     Vital Signs:   Temp:  [97.4 °F (36.3 °C)-98.3 °F (36.8 °C)] 98.3 °F (36.8 °C)  Heart Rate:  [] 108  Resp:  [16-18] 18  BP: (121-135)/(69-91) 135/83  Flow (L/min):  [3] 3    Physical Exam   Constitutional: obtunded and combative. Easily arousable. disheleved  Eyes: PERRLA, sclerae anicteric, no conjunctival injection  HENT: NCAT, mucous membranes moist  Neck: Supple, no thyromegaly, no lymphadenopathy, trachea midline  Respiratory: Clear to auscultation bilaterally, nonlabored respirations   Cardiovascular: RRR, no murmurs, rubs, or gallops, palpable pedal pulses bilaterally  Gastrointestinal: Positive bowel sounds, soft, nontender, nondistended  Musculoskeletal: No bilateral ankle edema, no clubbing or cyanosis to extremities  Psychiatric: Appropriate affect, cooperative  Neurologic: Oriented x 3, strength symmetric in all extremities, Cranial Nerves grossly intact to confrontation, speech clear  Skin: No rashes      Results Reviewed:  I have personally reviewed most recent indicated data and agree with findings including:  [x]  Laboratory  []  Radiology  []  EKG/Telemetry  []  Pathology  []  Cardiac/Vascular Studies  [x]  Old records  []  Other:  Most pertinent findings include: . Hypoxic to 90% on room air. Troponin negative. ALT 46. AST 83. Lactate 3.9. procalcitonin 0.26. WBC 14.44. Ethanol 285. CXR shows mild bibasilar pneumonia.      LAB RESULTS:      Lab 01/20/22  1812   WBC 14.44*   HEMOGLOBIN 17.1   HEMATOCRIT 51.6*   PLATELETS 274   NEUTROS ABS 10.31*   IMMATURE GRANS (ABS) 0.11*   LYMPHS ABS 2.91   MONOS ABS 1.03*   EOS ABS 0.04    MCV 93.8   PROCALCITONIN 0.26*   LACTATE 3.9*         Lab 01/20/22 1812   SODIUM 141   POTASSIUM 5.0   CHLORIDE 99   CO2 23.0   ANION GAP 19.0*   BUN 22*   CREATININE 1.22   GLUCOSE 71   CALCIUM 9.0         Lab 01/20/22 1812   TOTAL PROTEIN 7.7   ALBUMIN 4.80   GLOBULIN 2.9   ALT (SGPT) 46*   AST (SGOT) 83*   BILIRUBIN 0.4   ALK PHOS 61         Lab 01/20/22 1812   PROBNP 28.0   TROPONIN T <0.010                 Brief Urine Lab Results     None        Microbiology Results (last 10 days)     Procedure Component Value - Date/Time    COVID PRE-OP / PRE-PROCEDURE SCREENING ORDER (NO ISOLATION) - Swab, Nasopharynx [[id number]]  (Normal) Collected: 01/20/22 1817    Lab Status: Final result Specimen: Swab from Nasopharynx Updated: 01/20/22 1907    Narrative:      The following orders were created for panel order COVID PRE-OP / PRE-PROCEDURE SCREENING ORDER (NO ISOLATION) - Swab, Nasopharynx.  Procedure                               Abnormality         Status                     ---------                               -----------         ------                     COVID-19, FLU A/B, RSV P...[[id number]]  Normal              Final result                 Please view results for these tests on the individual orders.    COVID-19, FLU A/B, RSV PCR - Swab, Nasopharynx [[id number]]  (Normal) Collected: 01/20/22 1817    Lab Status: Final result Specimen: Swab from Nasopharynx Updated: 01/20/22 1907     COVID19 Not Detected     Influenza A PCR Not Detected     Influenza B PCR Not Detected     RSV, PCR Not Detected    Narrative:      Fact sheet for providers: https://www.fda.gov/media/040355/download    Fact sheet for patients: https://www.fda.gov/media/995027/download    Test performed by PCR.          CT Head Without Contrast    Result Date: 1/20/2022  CT Head WO HISTORY: Altered mental status and confusion TECHNIQUE: Routine noncontrast head CT. Radiation dose reduction techniques included automated exposure control or exposure  modulation based on body size. Radiation audit for CT and nuclear cardiology exams in the last 12 months: 0. COMPARISON: None. FINDINGS: No acute intracranial hemorrhage, mass lesion, or abnormal extra-axial fluid collection. No midline shift or focal mass effect. Ventricular system is normal in size and configuration. . The gray-white matter differentiation is preserved. Visualized paranasal sinuses are clear. Visualized mastoid air cells are clear. No acute osseous abnormality.     Impression: 1.  No acute intracranial abnormality. Signer Name: DANA QUISPE MD  Signed: 1/20/2022 7:36 PM  Workstation Name: [facility]  [facility]    XR Chest 1 View    Result Date: 1/20/2022  EXAMINATION: XR CHEST 1 VW-  INDICATION: AMS, low sats  COMPARISON: NONE  FINDINGS: Lung volumes are relatively low. There is mild patchy interstitial disease of the lung bases, whether atelectasis or mild changes of pneumonia. The upper and mid lungs are clear. Heart is borderline enlarged. Vasculature appears normal. No pneumothorax or effusion is seen.       Impression: Mild bibasilar atelectasis versus early changes of pneumonia.    This report was finalized on 1/20/2022 6:52 PM by Dr. Baldev Velazco MD.            Assessment/Plan   Assessment & Plan       Sepsis due to pneumonia (HCC)    Lactic acidosis    Elevated liver enzymes    Alcoholism (HCC)    COPD (chronic obstructive pulmonary disease) (HCC)    Chronic back pain    Tobacco abuse    51 yr old gentleman with history of Etoism and frequent visits to  ED, brought to [facility] ED after he was noted to be confused and intoxicated at a [place] gas station.      1. Sepsis due to pneumonia with COPD  Hypoxia - sats 89% in ED, suspect partly chronic  - CXR reviewed; ? R side effusion   - with lactic acidosis  - not on supplemental oxyen  - concern for aspiration.  - obtain blood cultures, sputum cultures, urine antigens, MRSA PCR  - start zosyn and vancomycin  - start  pulmonary toilet  - IVF  - am labs    2. Elevated Liver Enzymes:  - consisted with alcoholism/ alcoholic hepatitis  - check PT INR  - unchanged from labs at [facility] in December 2021  - acute hepatitis panel  - consider US gallbladder    3. Alcoholism:  - records indicate 1 pink liquor daily  - ETOH level 285  - [facility] protocol  - seizure precautions  - vitamin replacement  - addiction management    4. Tobacco abuse:  - nicotine patch as needed. Smokes 1 PPD. Plan to discuss cessation      DVT prophylaxis:  lovenox    CODE STATUS:  Full code  Level Of Support Discussed With: Patient  Code Status (Patient has no pulse and is not breathing): CPR (Attempt to Resuscitate)  Medical Interventions (Patient has pulse or is breathing): Full Support      This note has been completed as part of a split-shared workflow.     Electronically signed by Jm Cristina PA-C, 01/20/22, 9:35 PM EST.          Attending   Admission Attestation       I have seen and examined the patient, performing an independent face-to-face diagnostic evaluation with plan of care reviewed and developed with the advanced practice clinician (APC).      Brief Summary Statement:   Leonard Crandall is a 51 y.o. male with history as above.  Seen after reaching his room, he is sleeping, snoring comfortably, and NAD.  Given his recent combativeness and agitation in ED, I did not attempt to wake him .    Remainder of detailed HPI is as noted by APC and has been reviewed and/or edited by me for completeness.    Attending Physical Exam:    Constitutional: Asleep in bed, did not wake to voice or gentle exam.   HENT: NCAT,  Neck: Supple, trachea midline  Respiratory: snoring while asleep, no tachypnea, nonlabored.    Cardiovascular: mildly tachy RR, no murmur  Gastrointestinal: Positive bowel sounds, soft, nontender, nondistended  Musculoskeletal: No bilateral ankle edema, no clubbing or cyanosis to extremities  Psychiatric: cannot assess  Neurologic:  No restlessness, no  abnl movements, no tremor.  Asleep.   Skin: warm and dry     Brief Assessment/Plan :  See detailed assessment and plan developed with APC which I have reviewed and/or edited for completeness.        Admission Status: I believe that this patient meets OBSERVATION status, however if further evaluation or treatment plans warrant, status may change.  Based upon current information, I predict patient's care encounter to be less than or equal to 2 midnights.        Fátima Seals MD  01/20/22

## 2022-01-21 NOTE — CASE MANAGEMENT/SOCIAL WORK
Continued Stay Note  [facility]     Patient Name: Leonard Crandall  MRN: [id number]  Today's Date: 1/21/2022    Admit Date: 1/20/2022     Discharge Plan     Row Name 01/21/22 1455       Plan    Plan Ongoing    Plan Comments Met briefly with the patient today.  His admission BAL= 285.  CIWA range thus far- 5-7.  Pt not overly interactive, difficult to engage at this time.  A packet of AUD treatment resources was left for him at the bedside.  A this juncture, it appears highly unlikely that this patient will be willing to participate in AUD treatment.  Will follow.               Discharge Codes    No documentation.                     Emy Covarrubias RN ,BSN   Addiction Coordinator

## 2022-01-21 NOTE — PROGRESS NOTES
[facility] Medicine Services  PROGRESS NOTE    Patient Name: Leonard Crandall  : 1970  MRN: [id number]    Date of Admission: 2022  Primary Care Physician: Provider, No Known    Subjective   Subjective     CC:  F/U pneumonia    HPI:  Feels rough today.  Hurts all over.  Coughing, short of breath.  No tremors yet.    ROS:  Gen- No fevers, +chills  Resp- + cough, dyspnea  GI- + nausea and abd pain      Objective   Objective     Vital Signs:   Temp:  [97.2 °F (36.2 °C)-99.4 °F (37.4 °C)] 98.6 °F (37 °C)  Heart Rate:  [] 107  Resp:  [16-18] 18  BP: (100-143)/(51-91) 143/75  Flow (L/min):  [3] 3     Physical Exam:  Constitutional: No acute distress, awake, alert, laying in bed  HENT: NCAT, mucous membranes moist  Respiratory: Clear to auscultation bilaterally, respiratory effort normal   Cardiovascular: RRR, no murmurs, rubs, or gallops  Gastrointestinal: Positive bowel sounds, soft, nontender, nondistended  Musculoskeletal: No bilateral ankle edema  Psychiatric: Appropriate affect, cooperative  Neurologic: Cranial Nerves grossly intact to confrontation, speech clear  Skin: No rashes on exposed surfaces    Results Reviewed:  LAB RESULTS:      Lab 22  0706 22  2205 22  1812   WBC 11.17*  --  14.44*   HEMOGLOBIN 14.1  --  17.1   HEMATOCRIT 41.9  --  51.6*   PLATELETS 231  --  274   NEUTROS ABS  --   --  10.31*   IMMATURE GRANS (ABS)  --   --  0.11*   LYMPHS ABS  --   --  2.91   MONOS ABS  --   --  1.03*   EOS ABS  --   --  0.04   MCV 93.1  --  93.8   PROCALCITONIN  --   --  0.26*   LACTATE  --  3.5* 3.9*         Lab 22  0706 22  1812   SODIUM 141 141   POTASSIUM 4.3 5.0   CHLORIDE 107 99   CO2 23.0 23.0   ANION GAP 11.0 19.0*   BUN 21* 22*   CREATININE 0.85 1.22   GLUCOSE 98 71   CALCIUM 8.3* 9.0         Lab 22  0706 22  1812   TOTAL PROTEIN 5.6* 7.7   ALBUMIN 3.90 4.80   GLOBULIN 1.7 2.9   ALT (SGPT) 34 46*   AST (SGOT) 49* 83*    BILIRUBIN 0.6 0.4   ALK PHOS 49 61         Lab 01/20/22 1812   PROBNP 28.0   TROPONIN T <0.010                 Brief Urine Lab Results     None          Microbiology Results Abnormal     Procedure Component Value - Date/Time    S. Pneumo Ag Urine or CSF - Urine, Urine, Clean Catch [[id number]]  (Normal) Collected: 01/21/22 0020    Lab Status: Final result Specimen: Urine, Clean Catch Updated: 01/21/22 0950     Strep Pneumo Ag Negative    Legionella Antigen, Urine - Urine, Urine, Clean Catch [[id number]]  (Normal) Collected: 01/21/22 0020    Lab Status: Final result Specimen: Urine, Clean Catch Updated: 01/21/22 0950     LEGIONELLA ANTIGEN, URINE Negative    COVID PRE-OP / PRE-PROCEDURE SCREENING ORDER (NO ISOLATION) - Swab, Nasopharynx [[id number]]  (Normal) Collected: 01/20/22 1817    Lab Status: Final result Specimen: Swab from Nasopharynx Updated: 01/20/22 1907    Narrative:      The following orders were created for panel order COVID PRE-OP / PRE-PROCEDURE SCREENING ORDER (NO ISOLATION) - Swab, Nasopharynx.  Procedure                               Abnormality         Status                     ---------                               -----------         ------                     COVID-19, FLU A/B, RSV P...[[id number]]  Normal              Final result                 Please view results for these tests on the individual orders.    COVID-19, FLU A/B, RSV PCR - Swab, Nasopharynx [[id number]]  (Normal) Collected: 01/20/22 1817    Lab Status: Final result Specimen: Swab from Nasopharynx Updated: 01/20/22 1907     COVID19 Not Detected     Influenza A PCR Not Detected     Influenza B PCR Not Detected     RSV, PCR Not Detected    Narrative:      Fact sheet for providers: https://www.fda.gov/media/507130/download    Fact sheet for patients: https://www.fda.gov/media/123494/download    Test performed by PCR.          CT Head Without Contrast    Result Date: 1/20/2022  CT Head WO HISTORY: Altered mental status and confusion  TECHNIQUE: Routine noncontrast head CT. Radiation dose reduction techniques included automated exposure control or exposure modulation based on body size. Radiation audit for CT and nuclear cardiology exams in the last 12 months: 0. COMPARISON: None. FINDINGS: No acute intracranial hemorrhage, mass lesion, or abnormal extra-axial fluid collection. No midline shift or focal mass effect. Ventricular system is normal in size and configuration. . The gray-white matter differentiation is preserved. Visualized paranasal sinuses are clear. Visualized mastoid air cells are clear. No acute osseous abnormality.     Impression: 1.  No acute intracranial abnormality. Signer Name: DANA QUISPE MD  Signed: 1/20/2022 7:36 PM  Workstation Name: [email or web address]    XR Chest 1 View    Result Date: 1/20/2022  EXAMINATION: XR CHEST 1 VW-  INDICATION: AMS, low sats  COMPARISON: NONE  FINDINGS: Lung volumes are relatively low. There is mild patchy interstitial disease of the lung bases, whether atelectasis or mild changes of pneumonia. The upper and mid lungs are clear. Heart is borderline enlarged. Vasculature appears normal. No pneumothorax or effusion is seen.       Impression: Mild bibasilar atelectasis versus early changes of pneumonia.    This report was finalized on 1/20/2022 6:52 PM by Dr. Baldev Velazco MD.            I have reviewed the medications:  Scheduled Meds:[START ON 1/22/2022] Pharmacy Consult, , Does not apply, Once  albuterol, 2.5 mg, Nebulization, Q4H - RT  enoxaparin, 40 mg, Subcutaneous, Q24H  LORazepam, 1 mg, Intravenous, Q6H   Followed by  LORazepam, 1 mg, Intravenous, Q8H  nicotine, 1 patch, Transdermal, Q24H  nicotine, 1 patch, Transdermal, Once  piperacillin-tazobactam, 3.375 g, Intravenous, Q8H  sodium chloride, 10 mL, Intravenous, Q12H  IV Fluids 1000 mL + additives, 100 mL/hr, Intravenous, Daily  vancomycin, 1,250 mg, Intravenous, Q12H      Continuous Infusions:Pharmacy to dose  vancomycin,       PRN Meds:.LORazepam **OR** LORazepam **OR** LORazepam **OR** LORazepam **OR** LORazepam **OR** LORazepam  •  melatonin  •  Pharmacy to dose vancomycin  •  sodium chloride    Assessment/Plan   Assessment & Plan     Active Hospital Problems    Diagnosis  POA   • **Sepsis due to pneumonia (HCC) [J18.9, A41.9]  Yes   • Sepsis without acute organ dysfunction (HCC) [A41.9]  Yes   • Tobacco abuse [Z72.0]  Yes   • Lactic acidosis [E87.2]  Yes   • Alcoholism (HCC) [F10.20]  Yes   • COPD (chronic obstructive pulmonary disease) (HCC) [J44.9]  Yes   • Elevated liver enzymes [R74.8]  Yes   • Chronic back pain [M54.9, G89.29]  Yes      Resolved Hospital Problems   No resolved problems to display.        Brief Hospital Course to date:  Leonard Crandall is a 51 y.o. male with alcoholism and frequent visits to  ED, brought to [facility] ED after he was noted to be confused and intoxicated at a [employer] gas station.      This patient's problems and plans were partially entered by my partner and updated as appropriate by me 01/21/22.     Sepsis due to pneumonia with COPD, concern for aspiration pneumonia  -Blood cultures, sputum cultures pending, urine antigens negative, MRSA PCR pending  -Continue Zosyn and d/c vancomycin     Elevated Liver Enzymes  -Mild, likely due to alcohol use     Alcoholism  -1 pint liquor daily  -Continue CIWA protocol  -Chemical dependency consult     Tobacco abuse     DVT prophylaxis:  Medical DVT prophylaxis orders are present.            Disposition: I expect the patient to be discharged TBD.    CODE STATUS:   Code Status and Medical Interventions:   Ordered at: 01/20/22 2043     Level Of Support Discussed With:    Patient     Code Status (Patient has no pulse and is not breathing):    CPR (Attempt to Resuscitate)     Medical Interventions (Patient has pulse or is breathing):    Full Support       Jenny Ceballos MD  01/21/22

## 2022-01-21 NOTE — CASE MANAGEMENT/SOCIAL WORK
Continued Stay Note  [facility]     Patient Name: Leonard Crandall  MRN: [id number]  Today's Date: 1/21/2022    Admit Date: 1/20/2022     Discharge Plan     Row Name 01/21/22 0552       Plan    Plan Comments MSW spoke with pt at bedside. Pt reports he has been staying with friends as able, however is likely going to need to go to a homeless shelter at discharge. MSW called the [facility] [phone]. Pt is not restricted from there so he should be able to go there when he is ready for discharge, however at this current time, the [facility] is on an admitting hold due to Covid right now but she reports this could change next week. MSW remains available.    Row Name 01/21/22 8700                                Discharge Codes    No documentation.                     ZEESHAN Chou

## 2022-01-21 NOTE — PAYOR COMM NOTE
"Alma Brothers, RN Utilization Review [phone]  Fax # [phone]           Leonard Crandall (51 y.o. Male)             Date of Birth Social Security Number Address Home Phone MRN    1970  [facility] [place] [address] [phone] [id number]    Voodoo Marital Status             None Single       Admission Date Admission Type Admitting Provider Attending Provider Department, Room/Bed    22 Emergency Jenny Ceballos MD Brown, Hannah, MD [facility] 3H, S383/1    Discharge Date Discharge Disposition Discharge Destination                         Attending Provider: Jenny Ceballos MD    Allergies: No Known Allergies    Isolation: None   Infection: None   Code Status: CPR   Advance Care Planning Activity    Ht: 190.5 cm (75\")   Wt: 118 kg (260 lb)    Admission Cmt: None   Principal Problem: Sepsis due to pneumonia (HCC) [J18.9,A41.9]                 Active Insurance as of 2022     Primary Coverage     Payor Plan Insurance Group Employer/Plan Group    ANTHEM MEDICAID ANTHEM MEDICAID [id number]     Payor Plan Address Payor Plan Phone Number Payor Plan Fax Number Effective Dates    [address] [address] [phone]  2021 - None Entered    [facility] [id number]       Subscriber Name Subscriber Birth Date Member ID       LEONARD CRANDALL 1970 [id number]                 Emergency Contacts      (Rel.) Home Phone Work Phone Mobile Phone    LEYDA MOHAN (Friend) [phone] -- [phone]               History & Physical      Fátima Seals MD at [facility]              [facility] Medicine Services  HISTORY AND PHYSICAL    Patient Name: Leonard Crandall  : 1970  MRN: [id number]  Primary Care Physician: Provider, No Known  Date of admission: 2022    Subjective   Subjective     Chief Complaint:  AMS/intoxication    HPI:  Leonard Crandall is a 51 y.o. male with a past medical history significant for COPD, " chronic back pain, alcoholism, and tobacco abuse. He presents today with AMS likely due to alcohol intoxication. HPI limited secondary to patient disposition. He is disoriented, aggressive, and combative. Currently refusing to awake and answer questions. Threatening staff. Records reviewed indicate EMS was called to a [employer] station. The manager of the store had a confrontation with the patient about loitering and realized that he was confused and intoxicated. Upon arrival, patient complained to EMS about 10/10 chronic lower back pain and stated that he needed to come to the hospital. Further review of records from the [facility] indicate that patient is seen recurrently in ED related to sequelae of alcohol intoxication.    Currently there are no complaints or reports of fever, cough, congestion, SOB, or chest pain. No headache or focal weakness/parathesias. No falls or head trauma. No abdominal pain or N/V/D. Will admit to inpatient for further evaluation and treatment.      COVID Details:    Symptoms:    [x] NONE [] Fever []  Cough [] Shortness of breath [] Change in taste/smell      Review of Systems   Unable to perform ROS: Mental status change        All other systems reviewed and are negative.     Personal History     Past Medical History:   Diagnosis Date   • Alcoholism (HCC) 1/20/2022   • COPD (chronic obstructive pulmonary disease) ([facility]) 1/20/2022   • Tobacco abuse 1/20/2022       No past surgical history on file.    Family History: family history is not on file. Otherwise pertinent FHx was reviewed and unremarkable.     Social History:  reports that he has been smoking cigarettes. He has been smoking about 1.00 pack per day. He does not have any smokeless tobacco history on file. He reports current alcohol use. He reports that he does not use drugs.  Social History     Social History Narrative   • Not on file       Medications:       No Known Allergies    Objective   Objective     Vital  Signs:   Temp:  [97.4 °F (36.3 °C)-98.3 °F (36.8 °C)] 98.3 °F (36.8 °C)  Heart Rate:  [] 108  Resp:  [16-18] 18  BP: (121-135)/(69-91) 135/83  Flow (L/min):  [3] 3    Physical Exam   Constitutional: obtunded and combative. Easily arousable. disheleved  Eyes: PERRLA, sclerae anicteric, no conjunctival injection  HENT: NCAT, mucous membranes moist  Neck: Supple, no thyromegaly, no lymphadenopathy, trachea midline  Respiratory: Clear to auscultation bilaterally, nonlabored respirations   Cardiovascular: RRR, no murmurs, rubs, or gallops, palpable pedal pulses bilaterally  Gastrointestinal: Positive bowel sounds, soft, nontender, nondistended  Musculoskeletal: No bilateral ankle edema, no clubbing or cyanosis to extremities  Psychiatric: Appropriate affect, cooperative  Neurologic: Oriented x 3, strength symmetric in all extremities, Cranial Nerves grossly intact to confrontation, speech clear  Skin: No rashes      Results Reviewed:  I have personally reviewed most recent indicated data and agree with findings including:  [x]  Laboratory  []  Radiology  []  EKG/Telemetry  []  Pathology  []  Cardiac/Vascular Studies  [x]  Old records  []  Other:  Most pertinent findings include: . Hypoxic to 90% on room air. Troponin negative. ALT 46. AST 83. Lactate 3.9. procalcitonin 0.26. WBC 14.44. Ethanol 285. CXR shows mild bibasilar pneumonia.      LAB RESULTS:      Lab 01/20/22 1812   WBC 14.44*   HEMOGLOBIN 17.1   HEMATOCRIT 51.6*   PLATELETS 274   NEUTROS ABS 10.31*   IMMATURE GRANS (ABS) 0.11*   LYMPHS ABS 2.91   MONOS ABS 1.03*   EOS ABS 0.04   MCV 93.8   PROCALCITONIN 0.26*   LACTATE 3.9*         Lab 01/20/22 1812   SODIUM 141   POTASSIUM 5.0   CHLORIDE 99   CO2 23.0   ANION GAP 19.0*   BUN 22*   CREATININE 1.22   GLUCOSE 71   CALCIUM 9.0         Lab 01/20/22 1812   TOTAL PROTEIN 7.7   ALBUMIN 4.80   GLOBULIN 2.9   ALT (SGPT) 46*   AST (SGOT) 83*   BILIRUBIN 0.4   ALK PHOS 61         Lab 01/20/22  1064    PROBNP 28.0   TROPONIN T <0.010                 Brief Urine Lab Results     None        Microbiology Results (last 10 days)     Procedure Component Value - Date/Time    COVID PRE-OP / PRE-PROCEDURE SCREENING ORDER (NO ISOLATION) - Swab, Nasopharynx [[id number]]  (Normal) Collected: 01/20/22 1817    Lab Status: Final result Specimen: Swab from Nasopharynx Updated: 01/20/22 1907    Narrative:      The following orders were created for panel order COVID PRE-OP / PRE-PROCEDURE SCREENING ORDER (NO ISOLATION) - Swab, Nasopharynx.  Procedure                               Abnormality         Status                     ---------                               -----------         ------                     COVID-19, FLU A/B, RSV P...[[id number]]  Normal              Final result                 Please view results for these tests on the individual orders.    COVID-19, FLU A/B, RSV PCR - Swab, Nasopharynx [[id number]]  (Normal) Collected: 01/20/22 1817    Lab Status: Final result Specimen: Swab from Nasopharynx Updated: 01/20/22 1907     COVID19 Not Detected     Influenza A PCR Not Detected     Influenza B PCR Not Detected     RSV, PCR Not Detected    Narrative:      Fact sheet for providers: https://www.fda.gov/media/401087/download    Fact sheet for patients: https://www.fda.gov/media/947799/download    Test performed by PCR.          CT Head Without Contrast    Result Date: 1/20/2022  CT Head WO HISTORY: Altered mental status and confusion TECHNIQUE: Routine noncontrast head CT. Radiation dose reduction techniques included automated exposure control or exposure modulation based on body size. Radiation audit for CT and nuclear cardiology exams in the last 12 months: 0. COMPARISON: None. FINDINGS: No acute intracranial hemorrhage, mass lesion, or abnormal extra-axial fluid collection. No midline shift or focal mass effect. Ventricular system is normal in size and configuration. . The gray-white matter differentiation is  preserved. Visualized paranasal sinuses are clear. Visualized mastoid air cells are clear. No acute osseous abnormality.     Impression: 1.  No acute intracranial abnormality. Signer Name: DANA QUISPE MD  Signed: 1/20/2022 7:36 PM  Workstation Name: [id number]  [facility]    XR Chest 1 View    Result Date: 1/20/2022  EXAMINATION: XR CHEST 1 VW-  INDICATION: AMS, low sats  COMPARISON: NONE  FINDINGS: Lung volumes are relatively low. There is mild patchy interstitial disease of the lung bases, whether atelectasis or mild changes of pneumonia. The upper and mid lungs are clear. Heart is borderline enlarged. Vasculature appears normal. No pneumothorax or effusion is seen.       Impression: Mild bibasilar atelectasis versus early changes of pneumonia.    This report was finalized on 1/20/2022 6:52 PM by Dr. Baldev Velazco MD.            Assessment/Plan   Assessment & Plan       Sepsis due to pneumonia (HCC)    Lactic acidosis    Elevated liver enzymes    Alcoholism (HCC)    COPD (chronic obstructive pulmonary disease) (HCC)    Chronic back pain    Tobacco abuse    51 yr old gentleman with history of Etoism and frequent visits to  ED, brought to [facility] ED after he was noted to be confused and intoxicated at a [employer] gas station.      1. Sepsis due to pneumonia with COPD  Hypoxia - sats 89% in ED, suspect partly chronic  - CXR reviewed; ? R side effusion   - with lactic acidosis  - not on supplemental oxyen  - concern for aspiration.  - obtain blood cultures, sputum cultures, urine antigens, MRSA PCR  - start zosyn and vancomycin  - start pulmonary toilet  - IVF  - am labs    2. Elevated Liver Enzymes:  - consisted with alcoholism/ alcoholic hepatitis  - check PT INR  - unchanged from labs at  in December 2021  - acute hepatitis panel  - consider US gallbladder    3. Alcoholism:  - records indicate 1 pink liquor daily  - ETOH level 285  - CIWA protocol  - seizure precautions  - vitamin  replacement  - addiction management    4. Tobacco abuse:  - nicotine patch as needed. Smokes 1 PPD. Plan to discuss cessation      DVT prophylaxis:  lovenox    CODE STATUS:  Full code  Level Of Support Discussed With: Patient  Code Status (Patient has no pulse and is not breathing): CPR (Attempt to Resuscitate)  Medical Interventions (Patient has pulse or is breathing): Full Support      This note has been completed as part of a split-shared workflow.     Electronically signed by Jm Cristina PA-C, 01/20/22, 9:35 PM EST.          Attending   Admission Attestation       I have seen and examined the patient, performing an independent face-to-face diagnostic evaluation with plan of care reviewed and developed with the advanced practice clinician (APC).      Brief Summary Statement:   Leonard Crandall is a 51 y.o. male with history as above.  Seen after reaching his room, he is sleeping, snoring comfortably, and NAD.  Given his recent combativeness and agitation in ED, I did not attempt to wake him .    Remainder of detailed HPI is as noted by APC and has been reviewed and/or edited by me for completeness.    Attending Physical Exam:    Constitutional: Asleep in bed, did not wake to voice or gentle exam.   HENT: NCAT,  Neck: Supple, trachea midline  Respiratory: snoring while asleep, no tachypnea, nonlabored.    Cardiovascular: mildly tachy RR, no murmur  Gastrointestinal: Positive bowel sounds, soft, nontender, nondistended  Musculoskeletal: No bilateral ankle edema, no clubbing or cyanosis to extremities  Psychiatric: cannot assess  Neurologic:  No restlessness, no abnl movements, no tremor.  Asleep.   Skin: warm and dry     Brief Assessment/Plan :  See detailed assessment and plan developed with APC which I have reviewed and/or edited for completeness.        Admission Status: I believe that this patient meets OBSERVATION status, however if further evaluation or treatment plans warrant, status may change.  Based  upon current information, I predict patient's care encounter to be less than or equal to 2 midnights.        Fátima Seals MD  01/20/22                        Electronically signed by Fátima Seals MD at 01/20/22 2204          Emergency Department Notes      Godfrey Fairbanks MD at 01/20/22 1744          Subjective   Mr. Crandall is brought by EMS from [employer].  The manager there reported that he appeared intoxicated and was loitering.  He reported to EMS that his back pain has been worse than usual lately.  He admitted to them that he was has been drinking alcohol.  On my exam he is very lethargic and is not able to provide any additional history.  I can see indications history record that he has been at [facility] several times over the last couple of years.  He is reported to have alcohol problems, COPD, hypomagnesemia and low back pain.      History provided by:  EMS personnel and medical records  History limited by:  Mental status change  Altered Mental Status  Presenting symptoms: lethargy    Severity:  Severe  Most recent episode:  Today  Episode history:  Single  Timing:  Constant  Progression:  Worsening  Chronicity:  New  Context: alcohol use and homelessness        Review of Systems   Unable to perform ROS: Mental status change       No past medical history on file.    No Known Allergies    No past surgical history on file.    No family history on file.    Social History     Socioeconomic History   • Marital status: Single   Tobacco Use   • Smoking status: Current Every Day Smoker     Packs/day: 1.00     Types: Cigarettes   Substance and Sexual Activity   • Alcohol use: Yes     Comment: 1 PINT LIQUOR/DAY   • Drug use: No           Objective   Physical Exam  Vitals and nursing note reviewed.   Constitutional:       General: He is not in acute distress.     Comments: He is somnolent on my exam.  He does not awaken to verbal stimuli.  He does briefly start with sternal rub.  He  mumbles and then quickly goes back to sleep.  He smells of a wood fire.  He is noted to move all extremities at various times during exam but is not able to follow commands.  Saturations are 89% on 2 L of oxygen.   HENT:      Head: Normocephalic and atraumatic.      Nose: Nose normal. No congestion or rhinorrhea.      Mouth/Throat:      Mouth: Mucous membranes are dry.   Eyes:      General: No scleral icterus.     Conjunctiva/sclera: Conjunctivae normal.   Neck:      Comments: No JVD  Cardiovascular:      Rate and Rhythm: Normal rate and regular rhythm.      Heart sounds: Normal heart sounds. No murmur heard.  No friction rub.   Pulmonary:      Effort: Pulmonary effort is normal.      Breath sounds: Normal breath sounds. No wheezing or rales.   Abdominal:      General: Bowel sounds are normal. There is no distension.      Palpations: Abdomen is soft.      Tenderness: There is no abdominal tenderness.   Musculoskeletal:         General: No tenderness.      Cervical back: Normal range of motion and neck supple.      Right lower leg: No edema.      Left lower leg: No edema.   Skin:     General: Skin is dry.      Capillary Refill: Capillary refill takes less than 2 seconds.      Coloration: Skin is not pale.      Comments: Extremities overall cool to touch   Neurological:      General: No focal deficit present.      Comments: He is unable to follow commands.  He is noted during exam to move all extremities at different times.  For example he is holding the TV remote in his hand   Psychiatric:      Comments: Unable to assess         Procedures          ED Course  ED Course as of 01/20/22 1956   Thu Jan 20, 2022   1803 Staff reports that he is belligerent and combative when I try to check his temperature or do any other interventions.  I do not think he is able to refuse treatment at this point.  We will need to restrain him if necessary to obtain medical evaluation. [DT]   1808 Repeat exam now police are at bedside.    Raz is sitting up in bed.  He is able to answer questions.  He denies any cough, fever, or recent upper respiratory illnesses. [DT]   1857 White blood cell count is elevated, lactic acid as well as procalcitonin levels are also elevated.  Will give sepsis antibiotics. [DT]   1858 I have reviewed his chest x-ray as well as the radiologist interpretation.  I think Mr. Crandall has pneumonia given his low oxygen saturations and elevated white blood cell count and septic markers.  Have ordered IV antibiotics.  COVID swab is negative.  We will plan on admission to the hospital. [DT]   1910 Spoke with Mr. Crandall and advised him of findings.  He does acknowledge that he has been coughing.  He has eaten a dinner tray but remains drowsy. [DT]   1955 D/w Dr Seals who will admit. [DT]      ED Course User Index  [DT] Godfrey Fairbanks MD                                                 MDM  Number of Diagnoses or Management Options  Acute respiratory failure with hypoxia (HCC): new and requires workup  Alcoholic intoxication without complication (HCC): new and does not require workup  Altered mental status, unspecified altered mental status type: new and requires workup  Sepsis without acute organ dysfunction, due to unspecified organism (HCC): new and requires workup     Amount and/or Complexity of Data Reviewed  Clinical lab tests: reviewed and ordered  Tests in the radiology section of CPT®: ordered and reviewed  Obtain history from someone other than the patient: yes  Review and summarize past medical records: yes  Discuss the patient with other providers: yes  Independent visualization of images, tracings, or specimens: yes    Patient Progress  Patient progress: improved      Final diagnoses:   Altered mental status, unspecified altered mental status type   Sepsis without acute organ dysfunction, due to unspecified organism (HCC)   Acute respiratory failure with hypoxia (HCC)   Alcoholic intoxication without complication  ([facility])       ED Disposition  ED Disposition     ED Disposition Condition Comment    Decision to Admit  Level of Care: Telemetry [5]   Diagnosis: Sepsis without acute organ dysfunction, due to unspecified organism ([facility]) [[id number]]            No follow-up provider specified.       Medication List      No changes were made to your prescriptions during this visit.          Godfrey Fairbanks MD  01/20/22 1956      Electronically signed by Godfrey Fairbanks MD at 01/20/22 1956       Vital Signs (last day)     Date/Time Temp Temp src Pulse Resp BP Patient Position SpO2    01/21/22 0716 98.1 (36.7) Oral 120 18 100/74 Lying 95    01/21/22 0304 99.4 (37.4) Axillary -- 18 110/51 Lying 91    01/20/22 2356 -- -- 126 18 -- -- --    01/20/22 2313 97.2 (36.2) Axillary 121 18 115/67 Lying 93    01/20/22 2057 98.3 (36.8) Axillary 108 18 135/83 Lying 90    01/20/22 1844 97.4 (36.3) Oral -- -- -- -- --    01/20/22 1830 -- -- 116 -- 128/91 -- 92    01/20/22 1735 -- -- 94 16 -- -- 90    01/20/22 1730 -- -- -- -- 121/69 -- --            Current Facility-Administered Medications   Medication Dose Route Frequency Provider Last Rate Last Admin   • [START ON 1/22/2022] ! vancomycin trough ordered 1/22 @ 0530: please hold 1/22 0600 vancomycin dose until vancomycin trough evaluated by pharmacy   Does not apply Once Jose Trinh, PharmD       • albuterol (PROVENTIL) nebulizer solution 0.083% 2.5 mg/3mL  2.5 mg Nebulization Q4H - RT Jm Cristina PA-C   2.5 mg at 01/21/22 0730   • enoxaparin (LOVENOX) syringe 40 mg  40 mg Subcutaneous Q24H Jm Cristina PA-C   40 mg at 01/21/22 0502   • LORazepam (ATIVAN) injection 1 mg  1 mg Intravenous Q6H Fátima Seals MD   1 mg at 01/21/22 0934    Followed by   • LORazepam (ATIVAN) injection 1 mg  1 mg Intravenous Q8H Fátima Seals MD       • LORazepam (ATIVAN) tablet 1 mg  1 mg Oral Q2H PRN Fátima Seals MD        Or   • LORazepam (ATIVAN) injection 1 mg  1 mg Intravenous Q2H PRN Fátima Seals,  "MD        Or   • LORazepam (ATIVAN) tablet 2 mg  2 mg Oral Q1H PRN Fátima Seals MD        Or   • LORazepam (ATIVAN) injection 2 mg  2 mg Intravenous Q1H PRN Fátima Seals MD        Or   • LORazepam (ATIVAN) injection 2 mg  2 mg Intravenous Q15 Min PRN Fátima Seals MD        Or   • LORazepam (ATIVAN) injection 2 mg  2 mg Intramuscular Q15 Min PRN Fátima Seals MD       • melatonin tablet 5 mg  5 mg Oral Nightly PRN Jm Cristina PA-C       • nicotine (NICODERM CQ) 21 MG/24HR patch 1 patch  1 patch Transdermal Q24H Fátima Seals MD   1 patch at 01/21/22 0922   • nicotine (NICODERM CQ) 21 MG/24HR patch 1 patch  1 patch Transdermal Once Fátima Seals MD   1 patch at 01/20/22 2154   • Pharmacy to dose vancomycin   Does not apply Continuous PRN Jm Cristina PA-C       • piperacillin-tazobactam (ZOSYN) 3.375 g in iso-osmotic dextrose 50 ml (premix)  3.375 g Intravenous Q8H Jm Cristina PA-C 12.5 mL/hr at 01/21/22 0922 3.375 g at 01/21/22 0922   • sodium chloride 0.9 % flush 10 mL  10 mL Intravenous Q12H Jm Cristina PA-C   10 mL at 01/21/22 0923   • sodium chloride 0.9 % flush 10 mL  10 mL Intravenous PRN Jm Cristina PA-C       • thiamine (B-1) 100 mg, folic acid 1 mg in sodium chloride 0.9 % 1,000 mL infusion  100 mL/hr Intravenous Daily Fátima Seals  mL/hr at 01/20/22 2251 100 mL/hr at 01/20/22 2251   • vancomycin 1250 mg/250 mL 0.9% NS IVPB ([facility])  1,250 mg Intravenous Q12H Jose Trinh PharmD   1,250 mg at 01/21/22 0523     Orders (active)      Start     Ordered    01/22/22 0530  Vancomycin, Trough  Timed         01/20/22 2237 01/22/22 0530  ! vancomycin trough ordered 1/22 @ 0530: please hold 1/22 0600 vancomycin dose until vancomycin trough evaluated by pharmacy  Once         01/20/22 2237 01/21/22 2100  LORazepam (ATIVAN) injection 1 mg  Every 8 Hours        \"Followed by\" Linked Group Details    01/20/22 2047 01/21/22 0900  nicotine (NICODERM CQ) 21 MG/24HR " patch 1 patch  Every 24 Hours         01/20/22 2047 01/21/22 0600  enoxaparin (LOVENOX) syringe 40 mg  Every 24 Hours         01/20/22 2059 01/21/22 0600  vancomycin 1250 mg/250 mL 0.9% NS IVPB ([facility])  Every 12 Hours         01/20/22 2237 01/21/22 0200  piperacillin-tazobactam (ZOSYN) 3.375 g in iso-osmotic dextrose 50 ml (premix)  Every 8 Hours         01/20/22 2042 01/21/22 0058  Diet Regular  Diet Effective Now         01/21/22 0058 01/21/22 0000  Vital Signs  Every 4 Hours       01/20/22 2059 01/20/22 2330  albuterol (PROVENTIL) nebulizer solution 0.083% 2.5 mg/3mL  Every 4 Hours - RT         01/20/22 2059 01/20/22 2200  Incentive Spirometry  Every 4 Hours While Awake       01/20/22 2059 01/20/22 2145  sodium chloride 0.9 % flush 10 mL  Every 12 Hours Scheduled         01/20/22 2059 01/20/22 2145  nicotine (NICODERM CQ) 21 MG/24HR patch 1 patch  Once         01/20/22 2057 01/20/22 2100  Intake & Output  Every Shift       01/20/22 2059 01/20/22 2100  Weigh Patient  Once         01/20/22 2059 01/20/22 2100  Oxygen Therapy- Nasal Cannula; Titrate for SPO2: 90% - 95%  Continuous         01/20/22 2059 01/20/22 2100  Insert Peripheral IV  Once         01/20/22 2059 01/20/22 2100  Saline Lock & Maintain IV Access  Continuous         01/20/22 2059 01/20/22 2100  Tobacco Cessation Education  Once         01/20/22 2059 01/20/22 2100  Notify Provider if Patient Requires Greater Than 4LPM of Oxygen  Until Discontinued         01/20/22 2059 01/20/22 2100  Nursing Dysphagia Screening (Complete Prior to Giving Anything By Mouth)  Once        Comments: Use Dysphagia Screen for Pneumonia    01/20/22 2059 01/20/22 2100  RN to Place Order SLP Consult - Eval & Treat Choosing Reason of RN Dysphagia Screen Failed  Per Order Details        Comments: RN to Place Order SLP Consult - Eval & Treat Choosing Reason of RN Dysphagia Screen Failed    01/20/22 2059    01/20/22 8442  Nurse to  "Call MD or Nutrition Services for Diet if Patient Passes Dysphagia Screen  Once         01/20/22 2059 01/20/22 2100  Respiratory Culture - Sputum, Cough  Once         01/20/22 2059 01/20/22 2100  MRSA Screen, PCR (Inpatient) - Swab, Nares  Once         01/20/22 2059 01/20/22 2100  thiamine (B-1) 100 mg, folic acid 1 mg in sodium chloride 0.9 % 1,000 mL infusion  Daily         01/20/22 2047 01/20/22 2100  LORazepam (ATIVAN) injection 1 mg  Every 6 Hours        \"Followed by\" Linked Group Details    01/20/22 2047 01/20/22 2059  sodium chloride 0.9 % flush 10 mL  As Needed         01/20/22 2059 01/20/22 2059  melatonin tablet 5 mg  Nightly PRN         01/20/22 2059 01/20/22 2046  LORazepam (ATIVAN) tablet 1 mg  Every 2 Hours PRN        \"Or\" Linked Group Details    01/20/22 2047 01/20/22 2046  LORazepam (ATIVAN) injection 1 mg  Every 2 Hours PRN        \"Or\" Linked Group Details    01/20/22 2047 01/20/22 2046  LORazepam (ATIVAN) tablet 2 mg  Every 1 Hour PRN        \"Or\" Linked Group Details    01/20/22 2047 01/20/22 2046  LORazepam (ATIVAN) injection 2 mg  Every 1 Hour PRN        \"Or\" Linked Group Details    01/20/22 2047 01/20/22 2046  LORazepam (ATIVAN) injection 2 mg  Every 15 Minutes PRN        \"Or\" Linked Group Details    01/20/22 2047 01/20/22 2046  LORazepam (ATIVAN) injection 2 mg  Every 15 Minutes PRN        \"Or\" Linked Group Details    01/20/22 2047 01/20/22 2046  lactated ringers infusion  Continuous         01/20/22 2044 01/20/22 2046  Initiate Alcohol Withdrawal Protocol  Until Discontinued         01/20/22 2047 01/20/22 2046  Vital Signs  Per Hospital Policy         01/20/22 2047 01/20/22 2046  Continuous Pulse Oximetry  Continuous         01/20/22 2047 01/20/22 2046  Obtain Baseline Clinical [place] Withdrawal Assessment - Ar, Sedation Scale & Vital Signs  Once        Comments: Follow CIWA Scoring Reference Guide    01/20/22 2047 01/20/22 2046  " Clinical [place] Withdrawal Assessment (CIWA-Ar)  Per Hospital Policy         01/20/22 2047 01/20/22 2046  If CIWA Score Less Than 8 Monitor Every 4 Hours & Then Discontinue Assessment - Restart Scoring Assessment & Protocol If Symptoms Reappear  Continuous         01/20/22 2047 01/20/22 2046  Obtain Pre & Post Sedation Scores With Every Lorazepam Dose - Hold For POSS Greater Than 2 or RASS of -3 or Less  Continuous         01/20/22 2047 01/20/22 2046  Seizure Precautions  Continuous         01/20/22 2047 01/20/22 2046  Notify Provider - Withdrawal  Until Discontinued         01/20/22 2047 01/20/22 2046  Notify Provider of Abnormal Lab Results  Until Discontinued         01/20/22 2047 01/20/22 2046  Notify Provider - Vitals  Until Discontinued         01/20/22 2047 01/20/22 2046  Safety Precautions  Continuous         01/20/22 2047 01/20/22 2043  Code Status and Medical Interventions:  Continuous         01/20/22 2043 01/20/22 2041  Pharmacy to dose vancomycin  Continuous PRN         01/20/22 2042 01/20/22 1859  Blood Culture - Blood, Arm, Left  Once         01/20/22 1859 01/20/22 1859  Blood Culture - Blood, Arm, Right  Once         01/20/22 1859 01/20/22 1747  Urinalysis With Culture If Indicated - Urine, Catheter  Once         01/20/22 1746    01/20/22 1747  Blood Gas, Arterial -With Co-Ox Panel: Yes  Once         01/20/22 1746    01/20/22 1747  Cardiac Monitoring  Once         01/20/22 1746    Unscheduled  Blood Gas, Arterial -With Co-Ox Panel: Yes  As Needed      Comments: Respiratory Distress      01/20/22 2059                Operative/Procedure Notes (all)    No notes of this type exist for this encounter.         Physician Progress Notes (all)    No notes of this type exist for this encounter.         Consult Notes (all)    No notes of this type exist for this encounter.

## 2022-01-21 NOTE — PROGRESS NOTES
"Pharmacy Consult-Vancomycin Dosing  Leonard Crandall is a  51 y.o. male receiving vancomycin therapy.     Indication: pneumonia  Consulting Provider: hospital medicine  ID Consult: no    Goal AUC: 400 - 600 mg/L*hr    Current Antimicrobial Therapy  Anti-Infectives (From admission, onward)      Ordered     Dose/Rate Route Frequency Start Stop    01/20/22 2237  vancomycin 1250 mg/250 mL 0.9% NS IVPB ([facility])        Ordering Provider: Jose Trinh PharmD    1,250 mg  over 90 Minutes Intravenous Every 12 Hours 01/21/22 0600 01/28/22 0559    01/20/22 2042  piperacillin-tazobactam (ZOSYN) 3.375 g in iso-osmotic dextrose 50 ml (premix)        Ordering Provider: Jm Cristina PA-C    3.375 g  12.5 mL/hr over 4 Hours Intravenous Every 8 Hours 01/21/22 0200 01/28/22 0159    01/20/22 2042  Pharmacy to dose vancomycin        Ordering Provider: Jm Cristina PA-C     Does not apply Continuous PRN 01/20/22 2041 01/27/22 2040    01/20/22 1859  vancomycin 2000 mg/500 mL 0.9% NS IVPB ([facility])        Ordering Provider: Godfrey Fairbanks MD    20 mg/kg × 99.8 kg  250 mL/hr over 2 Hours Intravenous Once 01/20/22 1901 01/20/22 2233    01/20/22 1859  piperacillin-tazobactam (ZOSYN) 3.375 g in iso-osmotic dextrose 50 ml (premix)        Ordering Provider: Godfrey Fairbanks MD    3.375 g Intravenous Once 01/20/22 1901 01/20/22 2033            Allergies  Allergies as of 01/20/2022    (No Known Allergies)       Labs    Results from last 7 days   Lab Units 01/20/22  1812   BUN mg/dL 22*   CREATININE mg/dL 1.22       Results from last 7 days   Lab Units 01/20/22  1812   WBC 10*3/mm3 14.44*       Evaluation of Dosing     Last Dose Received in the ED/Outside Facility: vancomycin 2000 mg(dose: 17 mg/kg) given today in ed @ 20:33  Is Patient on Dialysis or Renal Replacement: no    Ht - 190.5 cm (75\")  Wt - 118 kg (260 lb)    Estimated Creatinine Clearance: 101.4 mL/min (by C-G formula based on SCr of 1.22 mg/dL).    Intake & Output (last 3 " days)       None            Microbiology and Radiology  Microbiology Results (last 10 days)       Procedure Component Value - Date/Time    COVID PRE-OP / PRE-PROCEDURE SCREENING ORDER (NO ISOLATION) - Swab, Nasopharynx [[id number]]  (Normal) Collected: 01/20/22 1817    Lab Status: Final result Specimen: Swab from Nasopharynx Updated: 01/20/22 1907    Narrative:      The following orders were created for panel order COVID PRE-OP / PRE-PROCEDURE SCREENING ORDER (NO ISOLATION) - Swab, Nasopharynx.  Procedure                               Abnormality         Status                     ---------                               -----------         ------                     COVID-19, FLU A/B, RSV P...[[id number]]  Normal              Final result                 Please view results for these tests on the individual orders.    COVID-19, FLU A/B, RSV PCR - Swab, Nasopharynx [[id number]]  (Normal) Collected: 01/20/22 1817    Lab Status: Final result Specimen: Swab from Nasopharynx Updated: 01/20/22 1907     COVID19 Not Detected     Influenza A PCR Not Detected     Influenza B PCR Not Detected     RSV, PCR Not Detected    Narrative:      Fact sheet for providers: https://www.fda.gov/media/365821/download    Fact sheet for patients: https://www.fda.gov/media/459659/download    Test performed by PCR.            Reported Vancomycin Levels                         [employer] AUC Calculation:    Current AUC:   na mg/L*hr    Predicted Steady State AUC on Current Dose: na mg/L*hr  _________________________________    Predicted Steady State AUC on New Dose:   544 mg/L*hr(associated steady state trough: 18 mg/l)    Assessment/Plan:  Will start scheduled vancomycin at 1250 mg(dose: 11 mg/kg) iv every 12 hours; will check vancomycin trough prior to am dose on 1/22 to verify vancomycin pharmacokinetic parameters.  Jose Trinh, PharmD

## 2022-01-22 VITALS
TEMPERATURE: 98.3 F | SYSTOLIC BLOOD PRESSURE: 101 MMHG | HEIGHT: 75 IN | OXYGEN SATURATION: 96 % | BODY MASS INDEX: 32.33 KG/M2 | HEART RATE: 88 BPM | DIASTOLIC BLOOD PRESSURE: 65 MMHG | WEIGHT: 260 LBS | RESPIRATION RATE: 18 BRPM

## 2022-01-22 PROCEDURE — 25010000002 PIPERACILLIN SOD-TAZOBACTAM PER 1 G: Performed by: PHYSICIAN ASSISTANT

## 2022-01-22 PROCEDURE — 25010000002 LORAZEPAM PER 2 MG: Performed by: INTERNAL MEDICINE

## 2022-01-22 PROCEDURE — 94799 UNLISTED PULMONARY SVC/PX: CPT

## 2022-01-22 PROCEDURE — 87070 CULTURE OTHR SPECIMN AEROBIC: CPT | Performed by: PHYSICIAN ASSISTANT

## 2022-01-22 PROCEDURE — 25010000002 ENOXAPARIN PER 10 MG: Performed by: PHYSICIAN ASSISTANT

## 2022-01-22 PROCEDURE — 87205 SMEAR GRAM STAIN: CPT | Performed by: PHYSICIAN ASSISTANT

## 2022-01-22 RX ORDER — ALBUTEROL SULFATE 2.5 MG/3ML
2.5 SOLUTION RESPIRATORY (INHALATION) EVERY 6 HOURS PRN
Status: DISCONTINUED | OUTPATIENT
Start: 2022-01-22 | End: 2022-01-22 | Stop reason: HOSPADM

## 2022-01-22 RX ORDER — ACETAMINOPHEN 325 MG/1
650 TABLET ORAL ONCE
Status: COMPLETED | OUTPATIENT
Start: 2022-01-22 | End: 2022-01-22

## 2022-01-22 RX ADMIN — TAZOBACTAM SODIUM AND PIPERACILLIN SODIUM 3.38 G: 375; 3 INJECTION, SOLUTION INTRAVENOUS at 01:26

## 2022-01-22 RX ADMIN — SODIUM CHLORIDE, PRESERVATIVE FREE 10 ML: 5 INJECTION INTRAVENOUS at 09:06

## 2022-01-22 RX ADMIN — TAZOBACTAM SODIUM AND PIPERACILLIN SODIUM 3.38 G: 375; 3 INJECTION, SOLUTION INTRAVENOUS at 09:06

## 2022-01-22 RX ADMIN — Medication 1 PATCH: at 09:06

## 2022-01-22 RX ADMIN — LORAZEPAM 1 MG: 2 INJECTION INTRAMUSCULAR; INTRAVENOUS at 05:06

## 2022-01-22 RX ADMIN — ACETAMINOPHEN 650 MG: 325 TABLET, FILM COATED ORAL at 04:22

## 2022-01-22 RX ADMIN — ENOXAPARIN SODIUM 40 MG: 40 INJECTION SUBCUTANEOUS at 05:06

## 2022-01-22 RX ADMIN — LORAZEPAM 1 MG: 2 INJECTION INTRAMUSCULAR; INTRAVENOUS at 10:54

## 2022-01-22 RX ADMIN — THIAMINE HCL TAB 100 MG 100 MG: 100 TAB at 09:06

## 2022-01-22 RX ADMIN — FOLIC ACID 1 MG: 1 TABLET ORAL at 09:06

## 2022-01-22 RX ADMIN — SODIUM CHLORIDE, POTASSIUM CHLORIDE, SODIUM LACTATE AND CALCIUM CHLORIDE 100 ML/HR: 600; 310; 30; 20 INJECTION, SOLUTION INTRAVENOUS at 01:30

## 2022-01-22 RX ADMIN — ALBUTEROL SULFATE 2.5 MG: 2.5 SOLUTION RESPIRATORY (INHALATION) at 07:59

## 2022-01-22 NOTE — PLAN OF CARE
Goal Outcome Evaluation:  Plan of Care Reviewed With: patient        Progress: no change    PT HAS MAINLY SLEPT THIS SHIFT. SR/ST ON TELEMETRY. REMAINS ON RA. CIWA OF 3 EARLY IN SHIFT. VOIDING PER URINAL.

## 2022-01-22 NOTE — PLAN OF CARE
Goal Outcome Evaluation:  Plan of Care Reviewed With: patient        Progress: no change  Outcome Summary: pt slept most of the morning, disoriented to situation, keeps asking when he can leave, CIWA 5-6, has been cooperative, not trying to get up on his own, ambulated to BR x1, very limited d/t fatigue, unsteady gait and dizziness, complains of headache, VSS,  has remained on RA all day, sats in 90s, sinus tach on tele, cont to monitor

## 2022-01-22 NOTE — DISCHARGE SUMMARY
[facility] Medicine Services  ELOPEMENT AGAINST MEDICAL ADVICE    Patient Name: Leonard Crandall  : 1970  MRN: [id number]    Date of Admission: 2022  Date of Elopement:  2022  Primary Care Physician: Provider, No Known    Consults     No orders found from 2021 to 2022.        Hospital Course     Presenting Problem:   Sepsis without acute organ dysfunction, due to unspecified organism (HCC) [A41.9]    Active Hospital Problems    Diagnosis  POA   • **Sepsis due to pneumonia (HCC) [J18.9, A41.9]  Yes   • Sepsis without acute organ dysfunction (HCC) [A41.9]  Yes   • Tobacco abuse [Z72.0]  Yes   • Lactic acidosis [E87.2]  Yes   • Alcoholism (HCC) [F10.20]  Yes   • COPD (chronic obstructive pulmonary disease) (HCC) [J44.9]  Yes   • Elevated liver enzymes [R74.8]  Yes   • Chronic back pain [M54.9, G89.29]  Yes      Resolved Hospital Problems   No resolved problems to display.          Hospital Course:  Leonard Crandall is a 51 y.o. male with alcoholism and frequent visits to  ED, brought to [facility] ED after he was noted to be confused and intoxicated at a [place] gas station.       This patient's problems and plans were partially entered by my partner and updated as appropriate by me 22.     Sepsis due to pneumonia with COPD, concern for aspiration pneumonia  -Blood cultures, sputum cultures pending, urine antigens negative, MRSA PCR negative  -Was being treated with Zosyn     Elevated Liver Enzymes  -Mild, likely due to alcohol use     Alcoholism  -1 pint liquor daily  -Was on ativan taper     Tobacco abuse  -Not ready to quit.  Refused nicotine replacement.  -Signed out AMA due to not being able to go outside to smoke    **Patient left AMA prior to completion of evaluation and management**      Day of Discharge     HPI:   **Patient left AMA prior to completion of evaluation and management**    Vital Signs:   Temp:  [98.3 °F (36.8 °C)-98.8 °F (37.1 °C)] 98.3 °F  (36.8 °C)  Heart Rate:  [68-98] 88  Resp:  [16-18] 18  BP: (101-136)/(65-83) 101/65     Physical Exam (if applicable):  Patient left prior to being examined  Pending Labs     Order Current Status    Blood Culture - Blood, Arm, Left Preliminary result    Blood Culture - Blood, Arm, Right Preliminary result    Respiratory Culture - Sputum, Cough Preliminary result        Discharge Details     Discharge Disposition:  **Patient left AMA prior to completion of evaluation and management, therefore discharge planning remains incomplete including absence of any needed discharging medications, testing arrangements or follow up unless otherwise specified**    No future appointments.          Jenny Ceballos MD  01/22/22

## 2022-01-23 ENCOUNTER — APPOINTMENT (OUTPATIENT)
Dept: CT IMAGING | Facility: HOSPITAL | Age: 52
End: 2022-01-23

## 2022-01-23 ENCOUNTER — HOSPITAL ENCOUNTER (EMERGENCY)
Facility: HOSPITAL | Age: 52
Discharge: HOME | End: 2022-01-23
Attending: EMERGENCY MEDICINE | Admitting: EMERGENCY MEDICINE

## 2022-01-23 ENCOUNTER — APPOINTMENT (OUTPATIENT)
Dept: GENERAL RADIOLOGY | Facility: HOSPITAL | Age: 52
End: 2022-01-23

## 2022-01-23 VITALS
TEMPERATURE: 98 F | SYSTOLIC BLOOD PRESSURE: 133 MMHG | RESPIRATION RATE: 18 BRPM | HEIGHT: 75 IN | HEART RATE: 89 BPM | BODY MASS INDEX: 32.33 KG/M2 | WEIGHT: 260 LBS | DIASTOLIC BLOOD PRESSURE: 82 MMHG | OXYGEN SATURATION: 95 %

## 2022-01-23 DIAGNOSIS — R06.02 SHORTNESS OF BREATH: ICD-10-CM

## 2022-01-23 DIAGNOSIS — R91.8 LUNG NODULES: ICD-10-CM

## 2022-01-23 DIAGNOSIS — J44.1 COPD WITH ACUTE EXACERBATION: Primary | ICD-10-CM

## 2022-01-23 LAB
ALBUMIN SERPL-MCNC: 4.8 G/DL (ref 3.5–5.2)
ALBUMIN/GLOB SERPL: 1.7 G/DL
ALP SERPL-CCNC: 61 U/L (ref 39–117)
ALT SERPL W P-5'-P-CCNC: 46 U/L (ref 1–41)
ANION GAP SERPL CALCULATED.3IONS-SCNC: 13 MMOL/L (ref 5–15)
AST SERPL-CCNC: 66 U/L (ref 1–40)
BASOPHILS # BLD AUTO: 0.06 10*3/MM3 (ref 0–0.2)
BASOPHILS NFR BLD AUTO: 0.5 % (ref 0–1.5)
BILIRUB SERPL-MCNC: 0.5 MG/DL (ref 0–1.2)
BUN SERPL-MCNC: 8 MG/DL (ref 6–20)
BUN/CREAT SERPL: 8.8 (ref 7–25)
CALCIUM SPEC-SCNC: 9.2 MG/DL (ref 8.6–10.5)
CHLORIDE SERPL-SCNC: 102 MMOL/L (ref 98–107)
CO2 SERPL-SCNC: 26 MMOL/L (ref 22–29)
CREAT SERPL-MCNC: 0.91 MG/DL (ref 0.76–1.27)
DEPRECATED RDW RBC AUTO: 41.7 FL (ref 37–54)
EOSINOPHIL # BLD AUTO: 0.48 10*3/MM3 (ref 0–0.4)
EOSINOPHIL NFR BLD AUTO: 3.9 % (ref 0.3–6.2)
ERYTHROCYTE [DISTWIDTH] IN BLOOD BY AUTOMATED COUNT: 12.3 % (ref 12.3–15.4)
FLUAV RNA RESP QL NAA+PROBE: NOT DETECTED
FLUBV RNA RESP QL NAA+PROBE: NOT DETECTED
GFR SERPL CREATININE-BSD FRML MDRD: 89 ML/MIN/1.73
GLOBULIN UR ELPH-MCNC: 2.8 GM/DL
GLUCOSE SERPL-MCNC: 101 MG/DL (ref 65–99)
HCT VFR BLD AUTO: 45.1 % (ref 37.5–51)
HGB BLD-MCNC: 15.4 G/DL (ref 13–17.7)
HOLD SPECIMEN: NORMAL
IMM GRANULOCYTES # BLD AUTO: 0.04 10*3/MM3 (ref 0–0.05)
IMM GRANULOCYTES NFR BLD AUTO: 0.3 % (ref 0–0.5)
LYMPHOCYTES # BLD AUTO: 2.57 10*3/MM3 (ref 0.7–3.1)
LYMPHOCYTES NFR BLD AUTO: 21.1 % (ref 19.6–45.3)
MCH RBC QN AUTO: 31.5 PG (ref 26.6–33)
MCHC RBC AUTO-ENTMCNC: 34.1 G/DL (ref 31.5–35.7)
MCV RBC AUTO: 92.2 FL (ref 79–97)
MONOCYTES # BLD AUTO: 0.98 10*3/MM3 (ref 0.1–0.9)
MONOCYTES NFR BLD AUTO: 8 % (ref 5–12)
NEUTROPHILS NFR BLD AUTO: 66.2 % (ref 42.7–76)
NEUTROPHILS NFR BLD AUTO: 8.07 10*3/MM3 (ref 1.7–7)
NRBC BLD AUTO-RTO: 0 /100 WBC (ref 0–0.2)
NT-PROBNP SERPL-MCNC: 275.7 PG/ML (ref 0–450)
PLATELET # BLD AUTO: 257 10*3/MM3 (ref 140–450)
PMV BLD AUTO: 9.7 FL (ref 6–12)
POTASSIUM SERPL-SCNC: 3.8 MMOL/L (ref 3.5–5.2)
PROT SERPL-MCNC: 7.6 G/DL (ref 6–8.5)
RBC # BLD AUTO: 4.89 10*6/MM3 (ref 4.14–5.8)
RSV RNA NPH QL NAA+NON-PROBE: NOT DETECTED
SARS-COV-2 RNA RESP QL NAA+PROBE: NOT DETECTED
SODIUM SERPL-SCNC: 141 MMOL/L (ref 136–145)
TROPONIN T SERPL-MCNC: <0.01 NG/ML (ref 0–0.03)
WBC NRBC COR # BLD: 12.2 10*3/MM3 (ref 3.4–10.8)
WHOLE BLOOD HOLD SPECIMEN: NORMAL
WHOLE BLOOD HOLD SPECIMEN: NORMAL

## 2022-01-23 PROCEDURE — 80053 COMPREHEN METABOLIC PANEL: CPT | Performed by: EMERGENCY MEDICINE

## 2022-01-23 PROCEDURE — 87637 SARSCOV2&INF A&B&RSV AMP PRB: CPT | Performed by: EMERGENCY MEDICINE

## 2022-01-23 PROCEDURE — 84484 ASSAY OF TROPONIN QUANT: CPT | Performed by: EMERGENCY MEDICINE

## 2022-01-23 PROCEDURE — 93005 ELECTROCARDIOGRAM TRACING: CPT | Performed by: EMERGENCY MEDICINE

## 2022-01-23 PROCEDURE — 99283 EMERGENCY DEPT VISIT LOW MDM: CPT

## 2022-01-23 PROCEDURE — 83880 ASSAY OF NATRIURETIC PEPTIDE: CPT | Performed by: EMERGENCY MEDICINE

## 2022-01-23 PROCEDURE — 85025 COMPLETE CBC W/AUTO DIFF WBC: CPT | Performed by: EMERGENCY MEDICINE

## 2022-01-23 PROCEDURE — 71275 CT ANGIOGRAPHY CHEST: CPT

## 2022-01-23 PROCEDURE — 0 IOPAMIDOL PER 1 ML: Performed by: EMERGENCY MEDICINE

## 2022-01-23 PROCEDURE — 93005 ELECTROCARDIOGRAM TRACING: CPT

## 2022-01-23 PROCEDURE — 71045 X-RAY EXAM CHEST 1 VIEW: CPT

## 2022-01-23 RX ORDER — DOXYCYCLINE 100 MG/1
100 CAPSULE ORAL 2 TIMES DAILY
Qty: 14 CAPSULE | Refills: 0 | Status: SHIPPED | OUTPATIENT
Start: 2022-01-23 | End: 2022-01-30

## 2022-01-23 RX ORDER — PREDNISONE 50 MG/1
50 TABLET ORAL DAILY
Qty: 5 TABLET | Refills: 0 | Status: SHIPPED | OUTPATIENT
Start: 2022-01-23 | End: 2022-09-14

## 2022-01-23 RX ORDER — SODIUM CHLORIDE 0.9 % (FLUSH) 0.9 %
10 SYRINGE (ML) INJECTION AS NEEDED
Status: DISCONTINUED | OUTPATIENT
Start: 2022-01-23 | End: 2022-01-23 | Stop reason: HOSPADM

## 2022-01-23 RX ADMIN — IOPAMIDOL 100 ML: 755 INJECTION, SOLUTION INTRAVENOUS at 04:17

## 2022-01-23 NOTE — DISCHARGE INSTRUCTIONS
Your CT scan demonstrated some nodules in your right lung.  It is very important that you follow-up with your primary care provider for further evaluation of this to ensure it does not represent a serious underlying process such as cancer.

## 2022-01-23 NOTE — ED PROVIDER NOTES
[facility]    EMERGENCY DEPARTMENT ENCOUNTER      Pt Name: Leonard Crandall  MRN: [id number]  YOB: 1970  Date of evaluation: 1/23/2022  Provider: Clarence Johnson MD    CHIEF COMPLAINT       Chief Complaint   Patient presents with   • Shortness of Breath         HISTORY OF PRESENT ILLNESS  (Location/Symptom, Timing/Onset, Context/Setting, Quality, Duration, Modifying Factors, Severity.)   Leonard Crandall is a 51 y.o. male who presents to the emergency department with ongoing shortness of breath that continues to be mild and worse w/ exertion after recent admission for PNA. He left AMA during hospital stay due to inability to smoke. States he came back tonight to complete his treatment. He denies any chest pain, fever, chills. Patient denies any history of VTE as well as any recent surgery, hospitalization, long distance travel, swelling or pain in the lower extremities, or exogenous hormone use.         Nursing notes were reviewed.    REVIEW OF SYSTEMS    (2-9 systems for level 4, 10 or more for level 5)   ROS:  General:  No fevers, no chills, no weakness  Cardiovascular:  No chest pain, no palpitations  Respiratory:  + Shortness of breath  Gastrointestinal:  No pain, no nausea, no vomiting, no diarrhea  Musculoskeletal:  No muscle pain, no joint pain  Skin:  No rash  Neurologic:  No speech problems, no headache, no extremity numbness, no extremity tingling, no extremity weakness  Psychiatric:  No anxiety  Genitourinary:  No dysuria, no hematuria    Except as noted above the remainder of the review of systems was reviewed and negative.       PAST MEDICAL HISTORY     Past Medical History:   Diagnosis Date   • Alcoholism ([facility]) 1/20/2022   • COPD (chronic obstructive pulmonary disease) ([facility]) 1/20/2022   • Tobacco abuse 1/20/2022         SURGICAL HISTORY     No past surgical history on file.      CURRENT MEDICATIONS     No current facility-administered medications for this encounter.    Current Outpatient  "Medications:   •  doxycycline (MONODOX) 100 MG capsule, Take 1 capsule by mouth 2 (Two) Times a Day for 7 days., Disp: 14 capsule, Rfl: 0  •  predniSONE (DELTASONE) 50 MG tablet, Take 1 tablet by mouth Daily., Disp: 5 tablet, Rfl: 0    ALLERGIES     Patient has no known allergies.    FAMILY HISTORY     No family history on file.       SOCIAL HISTORY       Social History     Socioeconomic History   • Marital status: Single   Tobacco Use   • Smoking status: Current Every Day Smoker     Packs/day: 1.00     Types: Cigarettes   Substance and Sexual Activity   • Alcohol use: Yes     Comment: 1 PINT LIQUOR/DAY   • Drug use: No         PHYSICAL EXAM    (up to 7 for level 4, 8 or more for level 5)     Vitals:    01/23/22 0245 01/23/22 0522   BP: 147/92 133/82   BP Location: Left arm    Patient Position: Sitting    Pulse: 106 89   Resp: 18    Temp: 98 °F (36.7 °C)    TempSrc: Oral    SpO2: 96% 95%   Weight: 118 kg (260 lb)    Height: 190.5 cm (75\")        Physical Exam  General: Awake, alert, no acute distress.  HEENT: Conjunctivae normal.  Neck: Trachea midline.  Cardiac: Heart regular rate, rhythm, no murmurs, rubs, or gallops  Lungs: Lungs are clear to auscultation, there is no wheezing, rhonchi, or rales. There is no use of accessory muscles.  Chest wall: There is no tenderness to palpation over the chest wall or over ribs  Abdomen: Abdomen is soft, nontender, nondistended. There are no firm or pulsatile masses, no rebound rigidity or guarding.   Musculoskeletal: No deformity.  Neuro: Alert and oriented x 4.  Dermatology: Skin is warm and dry  Psych: Mentation is grossly normal, cognition is grossly normal. Affect is appropriate.        DIAGNOSTIC RESULTS     EKG: All EKGs are interpreted by the Emergency Department Physician who either signs or Co-signs this chart in the absence of a cardiologist.    NSR, no acute ST/T changes, normal intervals, no ectopy    RADIOLOGY:   Non-plain film images such as CT, Ultrasound and " MRI are read by the radiologist. Plain radiographic images are visualized and preliminarily interpreted by the emergency physician with the below findings:      [x] Radiologist's Report Reviewed:  CT Angiogram Chest   Final Result      1. No PE or aortic dissection.   2. Mild COPD with areas of scarring/atelectasis in the lungs. No evidence of acute pneumonia.   3. 3 small nodules along the minor fissure on the right, probably benign lymph nodes rather than true pulmonary nodules. Attention on 6 month follow-up chest CT suggested.   4. Hepatic steatosis.         Recommend consideration for referral to [facility]. For questions or to make appointment call [phone].      Signer Name: Angel Draper MD    Signed: 1/23/2022 4:42 AM    Workstation Name: [facility]     Radiology [facility]      XR Chest 1 View   Final Result   No acute cardiopulmonary findings.      Signer Name: Angel Draper MD    Signed: 1/23/2022 3:38 AM    Workstation Name: [facility]     Radiology [facility]            ED BEDSIDE ULTRASOUND:   Performed by ED Physician - none    LABS:    I have reviewed and interpreted all of the currently available lab results from this visit (if applicable):  Results for orders placed or performed during the hospital encounter of 01/23/22   COVID-19, FLU A/B, RSV PCR - Swab, Nasopharynx    Specimen: Nasopharynx; Swab   Result Value Ref Range    COVID19 Not Detected Not Detected - Ref. Range    Influenza A PCR Not Detected Not Detected    Influenza B PCR Not Detected Not Detected    RSV, PCR Not Detected Not Detected   Comprehensive Metabolic Panel    Specimen: Blood   Result Value Ref Range    Glucose 101 (H) 65 - 99 mg/dL    BUN 8 6 - 20 mg/dL    Creatinine 0.91 0.76 - 1.27 mg/dL    Sodium 141 136 - 145 mmol/L    Potassium 3.8 3.5 - 5.2 mmol/L    Chloride 102 98 - 107 mmol/L    CO2 26.0 22.0 - 29.0 mmol/L    Calcium 9.2 8.6 - 10.5 mg/dL    Total Protein  7.6 6.0 - 8.5 g/dL    Albumin 4.80 3.50 - 5.20 g/dL    ALT (SGPT) 46 (H) 1 - 41 U/L    AST (SGOT) 66 (H) 1 - 40 U/L    Alkaline Phosphatase 61 39 - 117 U/L    Total Bilirubin 0.5 0.0 - 1.2 mg/dL    eGFR Non African Amer 89 >60 mL/min/1.73    Globulin 2.8 gm/dL    A/G Ratio 1.7 g/dL    BUN/Creatinine Ratio 8.8 7.0 - 25.0    Anion Gap 13.0 5.0 - 15.0 mmol/L   BNP    Specimen: Blood   Result Value Ref Range    proBNP 275.7 0.0 - 450.0 pg/mL   Troponin    Specimen: Blood   Result Value Ref Range    Troponin T <0.010 0.000 - 0.030 ng/mL   CBC Auto Differential    Specimen: Blood   Result Value Ref Range    WBC 12.20 (H) 3.40 - 10.80 10*3/mm3    RBC 4.89 4.14 - 5.80 10*6/mm3    Hemoglobin 15.4 13.0 - 17.7 g/dL    Hematocrit 45.1 37.5 - 51.0 %    MCV 92.2 79.0 - 97.0 fL    MCH 31.5 26.6 - 33.0 pg    MCHC 34.1 31.5 - 35.7 g/dL    RDW 12.3 12.3 - 15.4 %    RDW-SD 41.7 37.0 - 54.0 fl    MPV 9.7 6.0 - 12.0 fL    Platelets 257 140 - 450 10*3/mm3    Neutrophil % 66.2 42.7 - 76.0 %    Lymphocyte % 21.1 19.6 - 45.3 %    Monocyte % 8.0 5.0 - 12.0 %    Eosinophil % 3.9 0.3 - 6.2 %    Basophil % 0.5 0.0 - 1.5 %    Immature Grans % 0.3 0.0 - 0.5 %    Neutrophils, Absolute 8.07 (H) 1.70 - 7.00 10*3/mm3    Lymphocytes, Absolute 2.57 0.70 - 3.10 10*3/mm3    Monocytes, Absolute 0.98 (H) 0.10 - 0.90 10*3/mm3    Eosinophils, Absolute 0.48 (H) 0.00 - 0.40 10*3/mm3    Basophils, Absolute 0.06 0.00 - 0.20 10*3/mm3    Immature Grans, Absolute 0.04 0.00 - 0.05 10*3/mm3    nRBC 0.0 0.0 - 0.2 /100 WBC   Green Top (Gel)   Result Value Ref Range    Extra Tube Hold for add-ons.    Lavender Top   Result Value Ref Range    Extra Tube hold for add-on    Gold Top - SST   Result Value Ref Range    Extra Tube Hold for add-ons.    Gray Top   Result Value Ref Range    Extra Tube Hold for add-ons.    Light Blue Top   Result Value Ref Range    Extra Tube hold for add-on         All other labs were within normal range or not returned as of this  "dictation.      EMERGENCY DEPARTMENT COURSE and DIFFERENTIAL DIAGNOSIS/MDM:   Vitals:    Vitals:    01/23/22 0245 01/23/22 0522   BP: 147/92 133/82   BP Location: Left arm    Patient Position: Sitting    Pulse: 106 89   Resp: 18    Temp: 98 °F (36.7 °C)    TempSrc: Oral    SpO2: 96% 95%   Weight: 118 kg (260 lb)    Height: 190.5 cm (75\")        ED Course as of 01/25/22 1551   Sun Jan 23, 2022   0453 On reevaluation, the patient is sleeping with normal oxygen saturation on room air.  He remains in no acute distress.  CTA chest demonstrates no evidence of pneumonia, pneumothorax, pericardial effusion, pulmonary embolism.  Labs are also unremarkable with only mild leukocytosis without any significant anemia, electrolyte derangement, or evidence of myocardial injury.  Presentation is consistent with mild acute exacerbation of COPD for which I will prescribe antibiotics and steroids.  No indication for readmission at this time.  Patient does have some lung nodules in his right lung which I discussed with him and he understands need to follow-up with his PCP regarding this. [NS]      ED Course User Index  [NS] Clarence Johnson MD         I had a discussion with the patient/family regarding diagnosis, diagnostic results, treatment plan, and medications.  The patient/family indicated understanding of these instructions.  I spent adequate time at the bedside preceding discharge necessary to personally discuss the aftercare instructions, giving patient education, providing explanations of the results of our evaluations/findings, and my decision making to assure that the patient/family understand the plan of care.  Time was allotted to answer questions at that time and throughout the ED course.  Emphasis was placed on timely follow-up after discharge.  I also discussed the potential for the development of an acute emergent condition requiring further evaluation, admission, or even surgical intervention. I discussed that we " found nothing during the visit today indicating the need for further workup, admission, or the presence of an unstable medical condition.  I encouraged the patient to return to the emergency department immediately for ANY concerns, worsening, new complaints, or if symptoms persist and unable to seek follow-up in a timely fashion.  The patient/family expressed understanding and agreement with this plan.  The patient will follow-up with their PCP in 1-2 days for reevaluation.         FINAL IMPRESSION      1. COPD with acute exacerbation (HCC)    2. Shortness of breath    3. Lung nodules          DISPOSITION/PLAN     ED Disposition     ED Disposition Condition Comment    Discharge Stable           PATIENT REFERRED TO:  Provider, No Known  [place] [address]    Schedule an appointment as soon as possible for a visit in 2 days      [facility] Emergency Department  [address] [address] [phone]    If symptoms worsen      DISCHARGE MEDICATIONS:     Medication List      START taking these medications    doxycycline 100 MG capsule  Commonly known as: MONODOX  Take 1 capsule by mouth 2 (Two) Times a Day for 7 days.     predniSONE 50 MG tablet  Commonly known as: DELTASONE  Take 1 tablet by mouth Daily.           Where to Get Your Medications      These medications were sent to [facility]/pharmacy #[id number] - [place], [place] - [address]  - [phone]  - [phone] FX  [address] [facility] [address]    Hours: 24-hours Phone: [phone]   · doxycycline 100 MG capsule  · predniSONE 50 MG tablet             Comment: Please note this report has been produced using speech recognition software.      Clarence Johnson MD  Attending Emergency Physician               Clarence Johnson MD  01/25/22 4837

## 2022-01-24 ENCOUNTER — TELEPHONE (OUTPATIENT)
Dept: EMERGENCY DEPT | Facility: HOSPITAL | Age: 52
End: 2022-01-24

## 2022-01-24 ENCOUNTER — TELEPHONE (OUTPATIENT)
Dept: TELEMETRY | Facility: HOSPITAL | Age: 52
End: 2022-01-24

## 2022-01-24 LAB
BACTERIA SPEC RESP CULT: NORMAL
GRAM STN SPEC: NORMAL

## 2022-01-24 NOTE — TELEPHONE ENCOUNTER
COPD Nurse Navigator attempted to call number in chart in regard to ED visit 1/23/2022.  The person who answered the phone stated that there was no person by that name at that number.

## 2022-01-25 LAB
BACTERIA SPEC AEROBE CULT: ABNORMAL
BACTERIA SPEC AEROBE CULT: NORMAL
GRAM STN SPEC: ABNORMAL
ISOLATED FROM: ABNORMAL
QT INTERVAL: 362 MS
QTC INTERVAL: 466 MS

## 2022-02-02 LAB
QT INTERVAL: 350 MS
QTC INTERVAL: 458 MS

## 2022-09-14 ENCOUNTER — HOSPITAL ENCOUNTER (OUTPATIENT)
Dept: GENERAL RADIOLOGY | Facility: HOSPITAL | Age: 52
Discharge: HOME | End: 2022-09-14
Admitting: PHYSICIAN ASSISTANT

## 2022-09-14 ENCOUNTER — OFFICE VISIT (OUTPATIENT)
Dept: FAMILY MEDICINE CLINIC | Facility: CLINIC | Age: 52
End: 2022-09-14

## 2022-09-14 ENCOUNTER — PATIENT ROUNDING (BHMG ONLY) (OUTPATIENT)
Dept: FAMILY MEDICINE CLINIC | Facility: CLINIC | Age: 52
End: 2022-09-14

## 2022-09-14 VITALS
HEART RATE: 114 BPM | HEIGHT: 75 IN | BODY MASS INDEX: 28.97 KG/M2 | SYSTOLIC BLOOD PRESSURE: 118 MMHG | DIASTOLIC BLOOD PRESSURE: 86 MMHG | OXYGEN SATURATION: 96 % | WEIGHT: 233 LBS | TEMPERATURE: 96.8 F

## 2022-09-14 DIAGNOSIS — F10.10 ALCOHOL ABUSE: ICD-10-CM

## 2022-09-14 DIAGNOSIS — G89.29 CHRONIC LEFT-SIDED LOW BACK PAIN WITH LEFT-SIDED SCIATICA: Primary | ICD-10-CM

## 2022-09-14 DIAGNOSIS — M54.42 CHRONIC LEFT-SIDED LOW BACK PAIN WITH LEFT-SIDED SCIATICA: Primary | ICD-10-CM

## 2022-09-14 DIAGNOSIS — Z72.0 TOBACCO ABUSE: ICD-10-CM

## 2022-09-14 DIAGNOSIS — G89.29 CHRONIC LEFT HIP PAIN: ICD-10-CM

## 2022-09-14 DIAGNOSIS — M25.552 CHRONIC LEFT HIP PAIN: ICD-10-CM

## 2022-09-14 PROCEDURE — 99214 OFFICE O/P EST MOD 30 MIN: CPT | Performed by: PHYSICIAN ASSISTANT

## 2022-09-14 PROCEDURE — 73502 X-RAY EXAM HIP UNI 2-3 VIEWS: CPT

## 2022-09-14 RX ORDER — HYDROXYZINE 50 MG/1
25 TABLET, FILM COATED ORAL EVERY 4 HOURS PRN
COMMUNITY
Start: 2022-08-19 | End: 2022-09-14 | Stop reason: SDUPTHER

## 2022-09-14 RX ORDER — METHYLPREDNISOLONE 4 MG/1
TABLET ORAL
Qty: 21 TABLET | Refills: 0 | Status: SHIPPED | OUTPATIENT
Start: 2022-09-14 | End: 2023-01-04

## 2022-09-14 RX ORDER — HYDROXYZINE 50 MG/1
25-50 TABLET, FILM COATED ORAL EVERY 8 HOURS PRN
Qty: 90 TABLET | Refills: 1 | Status: SHIPPED | OUTPATIENT
Start: 2022-09-14

## 2022-09-14 RX ORDER — LISINOPRIL 10 MG/1
10 TABLET ORAL DAILY
Qty: 90 TABLET | Refills: 0 | Status: SHIPPED | OUTPATIENT
Start: 2022-09-14 | End: 2023-01-18 | Stop reason: SDUPTHER

## 2022-09-14 RX ORDER — METHOCARBAMOL 500 MG/1
500 TABLET, FILM COATED ORAL 3 TIMES DAILY PRN
Qty: 30 TABLET | Refills: 1 | Status: SHIPPED | OUTPATIENT
Start: 2022-09-14 | End: 2023-01-18 | Stop reason: SDUPTHER

## 2022-09-14 RX ORDER — LISINOPRIL 10 MG/1
10 TABLET ORAL DAILY
COMMUNITY
Start: 2022-08-19 | End: 2022-09-14 | Stop reason: SDUPTHER
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
## 2022-09-16 DIAGNOSIS — M16.12 PRIMARY OSTEOARTHRITIS OF LEFT HIP: Primary | ICD-10-CM

## 2022-09-27 ENCOUNTER — OFFICE VISIT (OUTPATIENT)
Dept: ORTHOPEDIC SURGERY | Facility: CLINIC | Age: 52
End: 2022-09-27

## 2022-09-27 VITALS
DIASTOLIC BLOOD PRESSURE: 100 MMHG | WEIGHT: 233.69 LBS | BODY MASS INDEX: 29.06 KG/M2 | SYSTOLIC BLOOD PRESSURE: 125 MMHG | HEIGHT: 75 IN

## 2022-09-27 DIAGNOSIS — Z72.0 TOBACCO ABUSE: ICD-10-CM

## 2022-09-27 DIAGNOSIS — M16.12 PRIMARY OSTEOARTHRITIS OF LEFT HIP: Primary | ICD-10-CM

## 2022-09-27 PROCEDURE — 99406 BEHAV CHNG SMOKING 3-10 MIN: CPT | Performed by: ORTHOPAEDIC SURGERY

## 2022-09-27 PROCEDURE — 99204 OFFICE O/P NEW MOD 45 MIN: CPT | Performed by: ORTHOPAEDIC SURGERY

## 2022-09-27 NOTE — PROGRESS NOTES
Orthopaedic Clinic Note: Hip New Patient    Chief Complaint   Patient presents with   • Left Hip - Pain        HPI  Consult from: MARTHA Maya    Leonard Crandall is a 51 y.o. male who presents with left hip pain for 2 year(s). Onset broke back. Pain is localized to lateral trochanter and groin and is a 8/10 on the pain scale.Pain is described as stabbing and shooting. Associated symptoms include pain, swelling, popping, stiffness and giving way/buckling.  The pain is worse with walking, standing, climbing stairs, sleeping, working, lying on affected side, rising from seated position and any movement of the joint; resting, sitting, ice and heat improve the pain. Previous treatments have included: bracing, NSAIDS, weight loss and oral steroids since symptom onset. Although some transient relief was reported with these interventions, these conservative measures have failed and symptoms have persisted. The patient is limited in daily activities and has had a significant decrease in quality of life as a result. He denies fevers, chills, or constitutional symptoms.  He does smoke on a daily basis.    I have reviewed the following portions of the patient's history:History of Present Illness    Past Medical History:   Diagnosis Date   • Alcoholism ([facility]) 1/20/2022   • COPD (chronic obstructive pulmonary disease) ([facility]) 1/20/2022   • Tobacco abuse 1/20/2022      History reviewed. No pertinent surgical history.   Family History   Problem Relation Age of Onset   • Diabetes Mother    • Acne Mother    • Heart attack Father      Social History     Socioeconomic History   • Marital status: Single   Tobacco Use   • Smoking status: Current Every Day Smoker     Packs/day: 1.00     Years: 40.00     Pack years: 40.00     Types: Cigarettes     Start date: 1985   • Smokeless tobacco: Former User   Vaping Use   • Vaping Use: Never used   Substance and Sexual Activity   • Alcohol use: Yes     Comment: 1 PINT LIQUOR/DAY. Quit 8/12/22   •  "Drug use: No   • Sexual activity: Defer      Current Outpatient Medications on File Prior to Visit   Medication Sig Dispense Refill   • hydrOXYzine (ATARAX) 50 MG tablet Take 0.5-1 tablets by mouth Every 8 (Eight) Hours As Needed for Anxiety. 90 tablet 1   • lisinopril (PRINIVIL,ZESTRIL) 10 MG tablet Take 1 tablet by mouth Daily. 90 tablet 0   • methocarbamol (Robaxin) 500 MG tablet Take 1 tablet by mouth 3 (Three) Times a Day As Needed for Muscle Spasms. 30 tablet 1   • methylPREDNISolone (MEDROL) 4 MG dose pack Take as directed on package instructions. 21 tablet 0   • mometasone-formoterol (DULERA 100) 100-5 MCG/ACT inhaler Inhale 2 puffs.     • ProAir  (90 Base) MCG/ACT inhaler      • tiotropium (SPIRIVA) 18 MCG per inhalation capsule Place 18 mcg into inhaler and inhale Daily.       No current facility-administered medications on file prior to visit.      No Known Allergies     Review of Systems   Constitutional: Negative.    HENT: Negative.    Eyes: Negative.    Respiratory: Negative.    Cardiovascular: Negative.    Gastrointestinal: Negative.    Endocrine: Negative.    Genitourinary: Negative.    Musculoskeletal: Positive for arthralgias.   Skin: Negative.    Allergic/Immunologic: Negative.    Neurological: Negative.    Hematological: Negative.    Psychiatric/Behavioral: Negative.         The patient's Review of Systems was personally reviewed and confirmed as accurate.    The following portions of the patient's history were reviewed and updated as appropriate: allergies, current medications, past family history, past medical history, past social history, past surgical history and problem list.    Physical Exam  Blood pressure 125/100, height 190.5 cm (75\"), weight 106 kg (233 lb 11 oz).    Body mass index is 29.21 kg/m².    GENERAL APPEARANCE: awake, alert & oriented x 3, in no acute distress and well developed, well nourished  PSYCH: normal affect  LUNGS:  breathing nonlabored  EYES: sclera " anicteric  CARDIOVASCULAR: palpable dorsalis pedis, palpable posterior tibial bilaterally. Capillary refill less than 2 seconds  EXTREMITIES: no clubbing, cyanosis  GAIT:  Antalgic           Right Hip Exam:  RANGE OF MOTION:   FLEXION CONTRACTURE: None   FLEXION: 110 degrees   INTERNAL ROTATION: 20 degrees at 90 degrees of flexion   EXTERNAL ROTATION: 40 degrees at 90 degrees of flexion    PAIN WITH HIP MOTION: no  PAIN WITH LOGROLL: no  STINCHFIELD TEST: negative    KNEE EXAM: full knee ROM (0-120 degrees), stable to varus and valgus stress at terminal extension and 30 degrees flexion     STRENGTH:  5/5 hip adduction, abduction, flexion. 5/5 strength knee flexion, extension. 5/5 strength ankle dorsiflexion and plantarflexion.     GREATER TROCHANTER BURSAL PAIN:  no     REFLEXES:   PATELLAR 2+/4   ACHILLES 2+/4    CLONUS: negative  STRAIGHT LEG TEST:   negative    SENSATION TO LIGHT TOUCH:  DEEP PERONEAL/SUPERFICIAL PERONEAL/SURAL/SAPHENOUS/TIBIAL:  intact    EDEMA:   no  ERYTHEMA:  no  WOUNDS/INCISIONS: no overlying skin problems.      Left Hip Exam:   RANGE OF MOTION:   FLEXION CONTRACTURE: None  FLEXION: 100 degrees  INTERNAL ROTATION: 10 degrees at 90 degrees of flexion   EXTERNAL ROTATION: 35 degrees at 90 degrees of flexion    PAIN WITH HIP MOTION: yes  PAIN WITH LOGROLL: no  STINCHFIELD TEST: positive    KNEE EXAM: full knee ROM (0-120 degrees), stable to varus and valgus stress at terminal extension and 30 degrees flexion     STRENGTH:  4/5 hip adduction, abduction, flexion. 5/5 knee flexion, extension. 5/5 ankle dorsiflexion and plantarflexion.     GREATER TROCHANTER BURSAL PAIN:  yes     REFLEXES:   PATELLAR 2+/4   ACHILLES 2+/4    CLONUS: no  STRAIGHT LEG TEST:   negative    SENSATION TO LIGHT TOUCH:  DEEP PERONEAL/SUPERFICIAL PERONEAL/SURAL/SAPHENOUS/TIBIAL:  intact    EDEMA:   no  ERYTHEMA:  no  WOUNDS/INCISIONS:  no        ------------------------------------------------------------------    LEG  LENGTHS:  equal  _____________________________________________________  _____________________________________________________    RADIOGRAPHIC FINDINGS:   Left hip radiographs from 9/14/2022 are personally interpreted.  Radiographs demonstrate advanced arthritis of the left hip with near bone-on-bone articulation periarticular osteophytes visualized at femoral head neck junction.  No acute bony injury or fracture.    Assessment/Plan:   Diagnosis Plan   1. Primary osteoarthritis of left hip     2. Tobacco abuse       Patient suffering or osteoarthritis left hip.  I discussed treatment options with him.  He is not a candidate for surgery due to his current smoking status.  In addition to this, given his young age, would recommend exhausting conservative treatment before proceeding to total hip arthroplasty.  He is agreeable to this plan.  Will prescribe him meloxicam.  He will follow-up in 2 months.    I spent approximately 3-10 minutes counseling the patient regarding the adverse effects of tobacco use.  Included in this counseling session were treatment options for cessation including quitting cold turkey, medication, nicotine step-down programs, and smoking cessation programs.  Furthermore, I explained to the patient the importance of abstaining from nicotine usage as nicotine is known to increase the risk of complications associated with surgery including but not limited to infection, decreased wound healing, slowed soft tissue healing, and increased surgery associated pain.  As a result of this counseling session, the patient will weigh the options and determine how to proceed with achieving tobacco cessation in preparation for surgery. This topic will be revisited upon return to clinic.    Thor Simon MD  09/27/22  08:14 EDT

## 2022-09-28 ENCOUNTER — TELEPHONE (OUTPATIENT)
Dept: ORTHOPEDIC SURGERY | Facility: CLINIC | Age: 52
End: 2022-09-28

## 2022-09-28 RX ORDER — MELOXICAM 15 MG/1
15 TABLET ORAL DAILY
Qty: 30 TABLET | Refills: 2 | Status: SHIPPED | OUTPATIENT
Start: 2022-09-28

## 2022-09-28 NOTE — TELEPHONE ENCOUNTER
Soila,    Will you please send in Meloxicam for the patient?  It is mentioned in Dr. Simon's note.      Thank you,    Adam PARNELL)

## 2022-09-28 NOTE — TELEPHONE ENCOUNTER
THIS PATIENT CALLED AND SAID THAT HE DISCUSSED GETTING AN ANTIINFLAMMATORY MEDICATION GETTING SENT IN FOR HIM BY DOCTOR PERRIN TO THE [facility]. THE MEDICATION WAS NOT SENT IN, PLEASE ADVISE.

## 2022-10-20 ENCOUNTER — TELEPHONE (OUTPATIENT)
Dept: FAMILY MEDICINE CLINIC | Facility: CLINIC | Age: 52
End: 2022-10-20

## 2022-11-01 ENCOUNTER — APPOINTMENT (OUTPATIENT)
Dept: CT IMAGING | Facility: HOSPITAL | Age: 52
End: 2022-11-01

## 2022-11-01 ENCOUNTER — HOSPITAL ENCOUNTER (EMERGENCY)
Facility: HOSPITAL | Age: 52
Discharge: HOME | End: 2022-11-01
Attending: EMERGENCY MEDICINE | Admitting: EMERGENCY MEDICINE

## 2022-11-01 ENCOUNTER — APPOINTMENT (OUTPATIENT)
Dept: GENERAL RADIOLOGY | Facility: HOSPITAL | Age: 52
End: 2022-11-01

## 2022-11-01 VITALS
RESPIRATION RATE: 18 BRPM | OXYGEN SATURATION: 90 % | HEART RATE: 94 BPM | WEIGHT: 230 LBS | BODY MASS INDEX: 29.52 KG/M2 | HEIGHT: 74 IN | SYSTOLIC BLOOD PRESSURE: 141 MMHG | TEMPERATURE: 97.3 F | DIASTOLIC BLOOD PRESSURE: 94 MMHG

## 2022-11-01 DIAGNOSIS — S22.32XA CLOSED FRACTURE OF ONE RIB OF LEFT SIDE, INITIAL ENCOUNTER: ICD-10-CM

## 2022-11-01 DIAGNOSIS — R91.1 PULMONARY NODULE: ICD-10-CM

## 2022-11-01 DIAGNOSIS — S09.90XA INJURY OF HEAD, INITIAL ENCOUNTER: Primary | ICD-10-CM

## 2022-11-01 DIAGNOSIS — S60.212A CONTUSION OF LEFT WRIST, INITIAL ENCOUNTER: ICD-10-CM

## 2022-11-01 PROCEDURE — 99284 EMERGENCY DEPT VISIT MOD MDM: CPT

## 2022-11-01 PROCEDURE — 71250 CT THORAX DX C-: CPT

## 2022-11-01 PROCEDURE — 73110 X-RAY EXAM OF WRIST: CPT

## 2022-11-01 PROCEDURE — 70450 CT HEAD/BRAIN W/O DYE: CPT

## 2022-11-01 RX ORDER — HYDROCODONE BITARTRATE AND ACETAMINOPHEN 5; 325 MG/1; MG/1
1 TABLET ORAL EVERY 6 HOURS PRN
Qty: 12 TABLET | Refills: 0 | Status: SHIPPED | OUTPATIENT
Start: 2022-11-01 | End: 2023-01-04

## 2022-11-01 RX ORDER — HYDROCODONE BITARTRATE AND ACETAMINOPHEN 5; 325 MG/1; MG/1
1 TABLET ORAL ONCE
Status: COMPLETED | OUTPATIENT
Start: 2022-11-01 | End: 2022-11-01

## 2022-11-01 RX ADMIN — HYDROCODONE BITARTRATE AND ACETAMINOPHEN 1 TABLET: 5; 325 TABLET ORAL at 12:48

## 2022-11-01 NOTE — DISCHARGE INSTRUCTIONS
Take meds as ordered for pain.  No alcohol.  Wear wrist splint.    Recommend repeat CT scan in 6 months for incidental finding of a pulmonary nodule.
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
## 2023-01-04 ENCOUNTER — OFFICE VISIT (OUTPATIENT)
Dept: FAMILY MEDICINE CLINIC | Facility: CLINIC | Age: 53
End: 2023-01-04
Payer: MEDICAID

## 2023-01-04 ENCOUNTER — TELEPHONE (OUTPATIENT)
Dept: FAMILY MEDICINE CLINIC | Facility: CLINIC | Age: 53
End: 2023-01-04

## 2023-01-04 VITALS
BODY MASS INDEX: 30.42 KG/M2 | OXYGEN SATURATION: 98 % | SYSTOLIC BLOOD PRESSURE: 134 MMHG | TEMPERATURE: 97.1 F | DIASTOLIC BLOOD PRESSURE: 86 MMHG | HEIGHT: 74 IN | HEART RATE: 75 BPM | WEIGHT: 237 LBS

## 2023-01-04 DIAGNOSIS — J44.9 CHRONIC OBSTRUCTIVE PULMONARY DISEASE, UNSPECIFIED COPD TYPE: Primary | ICD-10-CM

## 2023-01-04 DIAGNOSIS — Z12.11 ENCOUNTER FOR COLORECTAL CANCER SCREENING: ICD-10-CM

## 2023-01-04 DIAGNOSIS — E87.6 HYPOKALEMIA: ICD-10-CM

## 2023-01-04 DIAGNOSIS — M16.12 PRIMARY OSTEOARTHRITIS OF LEFT HIP: ICD-10-CM

## 2023-01-04 DIAGNOSIS — S41.101A: ICD-10-CM

## 2023-01-04 DIAGNOSIS — F17.200 TOBACCO DEPENDENCE: ICD-10-CM

## 2023-01-04 DIAGNOSIS — Z12.12 ENCOUNTER FOR COLORECTAL CANCER SCREENING: ICD-10-CM

## 2023-01-04 PROCEDURE — 1160F RVW MEDS BY RX/DR IN RCRD: CPT | Performed by: PHYSICIAN ASSISTANT

## 2023-01-04 PROCEDURE — 1159F MED LIST DOCD IN RCRD: CPT | Performed by: PHYSICIAN ASSISTANT

## 2023-01-04 PROCEDURE — 99214 OFFICE O/P EST MOD 30 MIN: CPT | Performed by: PHYSICIAN ASSISTANT

## 2023-01-04 RX ORDER — SULFAMETHOXAZOLE AND TRIMETHOPRIM 800; 160 MG/1; MG/1
1 TABLET ORAL
COMMUNITY
Start: 2022-12-31 | End: 2023-01-10

## 2023-01-04 RX ORDER — FLUTICASONE PROPIONATE AND SALMETEROL 100; 50 UG/1; UG/1
1 POWDER RESPIRATORY (INHALATION)
Qty: 60 EACH | Refills: 5 | Status: SHIPPED | OUTPATIENT
Start: 2023-01-04

## 2023-01-04 RX ORDER — ALBUTEROL SULFATE 90 UG/1
2 AEROSOL, METERED RESPIRATORY (INHALATION) EVERY 4 HOURS PRN
Qty: 18 G | Refills: 1 | Status: SHIPPED | OUTPATIENT
Start: 2023-01-04

## 2023-01-04 NOTE — LETTER
January 4, 2023     Patient: Leonard Crandall   YOB: 1970   Date of Visit: 1/4/2023       To Whom It May Concern:    It is my medical opinion that Leonard Crandall may return to work in one day.            Sincerely,        MARTHA Maya    CC: No Recipients

## 2023-01-04 NOTE — TELEPHONE ENCOUNTER
Caller: Leonard Crandall    Relationship: Self    Best call back number: [phone]    What form or medical record are you requesting: [employer] EXCUSE    Who is requesting this form or medical record from you: SHELL CURTIS     How would you like to receive the form or medical records (pick-up, mail, fax): FAX  If fax, what is the fax number: [phone]  ATTN: SVITLANA     Timeframe paperwork needed: ASAP    Additional notes: PATIENT WAS SEEN IN THE OFFICE TODAY BY EDGAR MENDOZA AND NEEDS AN EXCUSE FOR CLASS FOR TODAY

## 2023-01-04 NOTE — PROGRESS NOTES
Chief Complaint   Patient presents with   • Discuss test results     X-ray 11-1-22   • Hospital Follow Up Visit      12-31-22 possible spider bite/ staph right arm       HPI     Leonard Crandall is a pleasant 52 y.o. male who presents for ER follow-up.    The patient was seen in [facility] ER on 12/31 for right forearm cellulitis and wound. He was started on Bactrim which she is taking twice daily. He reports he is changing dressings every day and keeping the wound clean.     He was last prescribed spiriva and advair by the [place]. He states he still has dyspnea but continues to smoke 1/2-1 pack of cigarettes daily. He is not ready to quit right now due to current treatment for alcohol abuse.  He reports he has been sober for the past 3 weeks. He is receiving treatment through [facility].     He still is having pain due to his left hip arthritis. He takes meloxicam but it is not controlling pain.     Past Medical History:   Diagnosis Date   • Alcoholism ([facility]) 1/20/2022   • COPD (chronic obstructive pulmonary disease) ([facility]) 1/20/2022   • Tobacco abuse 1/20/2022       History reviewed. No pertinent surgical history.    Family History   Problem Relation Age of Onset   • Diabetes Mother    • Acne Mother    • Heart attack Father        Social History     Socioeconomic History   • Marital status: Single   Tobacco Use   • Smoking status: Every Day     Packs/day: 1.00     Years: 40.00     Pack years: 40.00     Types: Cigarettes     Start date: 1985   • Smokeless tobacco: Former   Vaping Use   • Vaping Use: Never used   Substance and Sexual Activity   • Alcohol use: Yes     Comment: 1 PINT LIQUOR/DAY.   • Drug use: No   • Sexual activity: Defer       No Known Allergies    ROS    Review of Systems   Constitutional: Negative for chills and fever.   HENT: Positive for congestion.    Respiratory: Positive for cough and shortness of breath.    Cardiovascular: Negative for chest pain and palpitations.        Vitals:    01/04/23 0833   BP: 134/86   Pulse: 75   Temp: 97.1 °F (36.2 °C)   SpO2: 98%     Body mass index is 30.43 kg/m².      Current Outpatient Medications:   •  hydrOXYzine (ATARAX) 50 MG tablet, Take 0.5-1 tablets by mouth Every 8 (Eight) Hours As Needed for Anxiety., Disp: 90 tablet, Rfl: 1  •  lisinopril (PRINIVIL,ZESTRIL) 10 MG tablet, Take 1 tablet by mouth Daily., Disp: 90 tablet, Rfl: 0  •  meloxicam (MOBIC) 15 MG tablet, Take 1 tablet by mouth Daily., Disp: 30 tablet, Rfl: 2  •  methocarbamol (Robaxin) 500 MG tablet, Take 1 tablet by mouth 3 (Three) Times a Day As Needed for Muscle Spasms., Disp: 30 tablet, Rfl: 1  •  mupirocin (BACTROBAN) 2 % ointment, Apply to affected area of right forearm after cleansing twice a day, Disp: , Rfl:   •  ProAir  (90 Base) MCG/ACT inhaler, Inhale 2 puffs Every 4 (Four) Hours As Needed for Wheezing or Shortness of Air., Disp: 18 g, Rfl: 1  •  sulfamethoxazole-trimethoprim (BACTRIM DS,SEPTRA DS) 800-160 MG per tablet, Take 1 tablet by mouth., Disp: , Rfl:   •  Fluticasone-Salmeterol (ADVAIR/WIXELA) 100-50 MCG/ACT DISKUS, Inhale 1 puff 2 (Two) Times a Day., Disp: 60 each, Rfl: 5  •  tiotropium (SPIRIVA) 18 MCG per inhalation capsule, Place 1 capsule into inhaler and inhale Daily., Disp: 30 capsule, Rfl: 5    PE    Physical Exam  Vitals reviewed.   Constitutional:       General: He is not in acute distress.     Appearance: He is well-developed. He is ill-appearing.   HENT:      Head: Normocephalic and atraumatic.   Eyes:      Conjunctiva/sclera: Conjunctivae normal.   Cardiovascular:      Rate and Rhythm: Normal rate and regular rhythm.      Heart sounds: Normal heart sounds. No murmur heard.  Pulmonary:      Effort: Pulmonary effort is normal.      Breath sounds: Normal breath sounds. No wheezing, rhonchi or rales.   Musculoskeletal:      Cervical back: Normal range of motion.   Skin:     General: Skin is warm and dry.          Neurological:      Mental  Status: He is alert.      Gait: Gait normal.   Psychiatric:         Speech: Speech normal.         Behavior: Behavior normal.          A/P    Problem List Items Addressed This Visit        Pulmonary and Pneumonias    COPD (chronic obstructive pulmonary disease) (HCC) - Primary  -Continue Advair and Spiriva at this time.    -Refilled albuterol inhaler to use as needed.  -Strongly encouraged the patient to consider tobacco cessation.  He declines assistance at this time and states he is not ready to quit right now.  -Chest CT on 11/1/2022 showed a 6 mm noncalcified right lower lobe lung nodule and a 6 mm or less nodular thickening along the minor fissure of the right lung unchanged from 1/23/22.  -Follow-up in 6 months. We will order a repeat CT scan at that time to document stability.    Relevant Medications    ProAir  (90 Base) MCG/ACT inhaler    Fluticasone-Salmeterol (ADVAIR/WIXELA) 100-50 MCG/ACT DISKUS    tiotropium (SPIRIVA) 18 MCG per inhalation capsule   Other Visit Diagnoses     Tobacco dependence        Primary osteoarthritis of left hip      -Established with orthopedic surgery. Recommended the patient schedule a follow-up appointment.      Hypokalemia      -Potassium was 3.3 on 12/11/2022 in the emergency room.  -Repeat BMP today.    Relevant Orders    Basic Metabolic Panel    Wound of upper extremity, right, initial encounter   -Continue cleaning with warm soapy water and changing dressings daily.  -Counseled the patient to start using nonstick dressings and wrapping and Coban. I did supply him with some Coban today and advised him to  some nonstick dressings from his pharmacy.  -Complete Bactrim. Counseled the patient to return if his wound is not healed after he completes antibiotics.  -No evidence of cellulitis on exam.    Encounter for colorectal cancer screening      -No prior colorectal cancer screening.  -He is agreeable to Cologuard after discussion. He denies a family history of  colon polyps or colon cancer.    Relevant Orders    Cologuard - Stool, Per Rectum          Plan of care was reviewed with patient at the conclusion of today's visit. Education was provided regarding diagnoses, management, prescribed or recommended OTC products, and the importance of compliance with follow-up appointments. The patient was counseled regarding the risks, benefits, and possible side-effects of treatment. I advised the patient to keep me informed of any acute changes in their status including new, worsening, or persistent symptoms. Patient expresses understanding and agreement with the management plan.        MARTHA Maya

## 2023-01-18 RX ORDER — LISINOPRIL 10 MG/1
10 TABLET ORAL DAILY
Qty: 90 TABLET | Refills: 0 | Status: SHIPPED | OUTPATIENT
Start: 2023-01-18

## 2023-01-18 RX ORDER — METHOCARBAMOL 500 MG/1
500 TABLET, FILM COATED ORAL 3 TIMES DAILY PRN
Qty: 30 TABLET | Refills: 1 | Status: SHIPPED | OUTPATIENT
Start: 2023-01-18

## 2023-02-28 ENCOUNTER — HOSPITAL ENCOUNTER (INPATIENT)
Facility: HOSPITAL | Age: 53
LOS: 2 days | Discharge: LEFT BEFORE DISCHARGE | DRG: 894 | End: 2023-03-02
Attending: EMERGENCY MEDICINE | Admitting: FAMILY MEDICINE
Payer: MEDICAID

## 2023-02-28 ENCOUNTER — APPOINTMENT (OUTPATIENT)
Dept: CARDIOLOGY | Facility: HOSPITAL | Age: 53
DRG: 894 | End: 2023-02-28
Payer: MEDICAID

## 2023-02-28 ENCOUNTER — APPOINTMENT (OUTPATIENT)
Dept: GENERAL RADIOLOGY | Facility: HOSPITAL | Age: 53
DRG: 894 | End: 2023-02-28
Payer: MEDICAID

## 2023-02-28 DIAGNOSIS — F10.939 ALCOHOL WITHDRAWAL SYNDROME WITH COMPLICATION: ICD-10-CM

## 2023-02-28 DIAGNOSIS — I48.91 ATRIAL FIBRILLATION WITH RAPID VENTRICULAR RESPONSE: Primary | ICD-10-CM

## 2023-02-28 DIAGNOSIS — F10.21 HISTORY OF ALCOHOLISM: ICD-10-CM

## 2023-02-28 DIAGNOSIS — R07.9 CHEST PAIN, UNSPECIFIED TYPE: ICD-10-CM

## 2023-02-28 PROBLEM — I48.92 ATRIAL FLUTTER WITH RAPID VENTRICULAR RESPONSE: Status: ACTIVE | Noted: 2023-02-28

## 2023-02-28 LAB
ALBUMIN SERPL-MCNC: 4.4 G/DL (ref 3.5–5.2)
ALBUMIN/GLOB SERPL: 2 G/DL
ALP SERPL-CCNC: 88 U/L (ref 39–117)
ALT SERPL W P-5'-P-CCNC: 41 U/L (ref 1–41)
ANION GAP SERPL CALCULATED.3IONS-SCNC: 18 MMOL/L (ref 5–15)
APAP SERPL-MCNC: <5 MCG/ML (ref 0–30)
AST SERPL-CCNC: 50 U/L (ref 1–40)
BASOPHILS # BLD AUTO: 0.06 10*3/MM3 (ref 0–0.2)
BASOPHILS NFR BLD AUTO: 0.6 % (ref 0–1.5)
BILIRUB SERPL-MCNC: 1.3 MG/DL (ref 0–1.2)
BUN SERPL-MCNC: 7 MG/DL (ref 6–20)
BUN/CREAT SERPL: 8.5 (ref 7–25)
CALCIUM SPEC-SCNC: 8.7 MG/DL (ref 8.6–10.5)
CHLORIDE SERPL-SCNC: 100 MMOL/L (ref 98–107)
CO2 SERPL-SCNC: 23 MMOL/L (ref 22–29)
CREAT SERPL-MCNC: 0.82 MG/DL (ref 0.76–1.27)
DEPRECATED RDW RBC AUTO: 41.2 FL (ref 37–54)
EGFRCR SERPLBLD CKD-EPI 2021: 107 ML/MIN/1.73
EOSINOPHIL # BLD AUTO: 0.09 10*3/MM3 (ref 0–0.4)
EOSINOPHIL NFR BLD AUTO: 0.9 % (ref 0.3–6.2)
ERYTHROCYTE [DISTWIDTH] IN BLOOD BY AUTOMATED COUNT: 12.6 % (ref 12.3–15.4)
ETHANOL BLD-MCNC: 47 MG/DL (ref 0–10)
FLUAV RNA RESP QL NAA+PROBE: NOT DETECTED
FLUBV RNA RESP QL NAA+PROBE: NOT DETECTED
GEN 5 2HR TROPONIN T REFLEX: 6 NG/L
GLOBULIN UR ELPH-MCNC: 2.2 GM/DL
GLUCOSE SERPL-MCNC: 118 MG/DL (ref 65–99)
HCT VFR BLD AUTO: 47.1 % (ref 37.5–51)
HGB BLD-MCNC: 16.7 G/DL (ref 13–17.7)
HOLD SPECIMEN: NORMAL
IMM GRANULOCYTES # BLD AUTO: 0.02 10*3/MM3 (ref 0–0.05)
IMM GRANULOCYTES NFR BLD AUTO: 0.2 % (ref 0–0.5)
LIPASE SERPL-CCNC: 36 U/L (ref 13–60)
LYMPHOCYTES # BLD AUTO: 1.75 10*3/MM3 (ref 0.7–3.1)
LYMPHOCYTES NFR BLD AUTO: 17.9 % (ref 19.6–45.3)
MAGNESIUM SERPL-MCNC: 2.1 MG/DL (ref 1.6–2.6)
MCH RBC QN AUTO: 31.6 PG (ref 26.6–33)
MCHC RBC AUTO-ENTMCNC: 35.5 G/DL (ref 31.5–35.7)
MCV RBC AUTO: 89 FL (ref 79–97)
MONOCYTES # BLD AUTO: 0.74 10*3/MM3 (ref 0.1–0.9)
MONOCYTES NFR BLD AUTO: 7.6 % (ref 5–12)
NEUTROPHILS NFR BLD AUTO: 7.12 10*3/MM3 (ref 1.7–7)
NEUTROPHILS NFR BLD AUTO: 72.8 % (ref 42.7–76)
NRBC BLD AUTO-RTO: 0 /100 WBC (ref 0–0.2)
NT-PROBNP SERPL-MCNC: 13.9 PG/ML (ref 0–900)
PLATELET # BLD AUTO: 254 10*3/MM3 (ref 140–450)
PMV BLD AUTO: 8.8 FL (ref 6–12)
POTASSIUM SERPL-SCNC: 4.1 MMOL/L (ref 3.5–5.2)
PROT SERPL-MCNC: 6.6 G/DL (ref 6–8.5)
QT INTERVAL: 260 MS
QT INTERVAL: 362 MS
QT INTERVAL: 382 MS
QTC INTERVAL: 413 MS
QTC INTERVAL: 466 MS
QTC INTERVAL: 467 MS
RBC # BLD AUTO: 5.29 10*6/MM3 (ref 4.14–5.8)
SALICYLATES SERPL-MCNC: <0.3 MG/DL
SARS-COV-2 RNA RESP QL NAA+PROBE: NOT DETECTED
SODIUM SERPL-SCNC: 141 MMOL/L (ref 136–145)
TROPONIN T DELTA: NORMAL
TROPONIN T SERPL HS-MCNC: <6 NG/L
TSH SERPL DL<=0.05 MIU/L-ACNC: 0.72 UIU/ML (ref 0.27–4.2)
WBC NRBC COR # BLD: 9.78 10*3/MM3 (ref 3.4–10.8)
WHOLE BLOOD HOLD COAG: NORMAL
WHOLE BLOOD HOLD SPECIMEN: NORMAL

## 2023-02-28 PROCEDURE — 99285 EMERGENCY DEPT VISIT HI MDM: CPT

## 2023-02-28 PROCEDURE — 93005 ELECTROCARDIOGRAM TRACING: CPT | Performed by: EMERGENCY MEDICINE

## 2023-02-28 PROCEDURE — 80179 DRUG ASSAY SALICYLATE: CPT | Performed by: EMERGENCY MEDICINE

## 2023-02-28 PROCEDURE — 25010000002 HEPARIN (PORCINE) PER 1000 UNITS: Performed by: FAMILY MEDICINE

## 2023-02-28 PROCEDURE — 93306 TTE W/DOPPLER COMPLETE: CPT

## 2023-02-28 PROCEDURE — 94799 UNLISTED PULMONARY SVC/PX: CPT

## 2023-02-28 PROCEDURE — 93306 TTE W/DOPPLER COMPLETE: CPT | Performed by: INTERNAL MEDICINE

## 2023-02-28 PROCEDURE — 94640 AIRWAY INHALATION TREATMENT: CPT

## 2023-02-28 PROCEDURE — 84484 ASSAY OF TROPONIN QUANT: CPT | Performed by: EMERGENCY MEDICINE

## 2023-02-28 PROCEDURE — 25010000002 THIAMINE PER 100 MG: Performed by: EMERGENCY MEDICINE

## 2023-02-28 PROCEDURE — 25010000002 LORAZEPAM PER 2 MG: Performed by: EMERGENCY MEDICINE

## 2023-02-28 PROCEDURE — 80143 DRUG ASSAY ACETAMINOPHEN: CPT | Performed by: EMERGENCY MEDICINE

## 2023-02-28 PROCEDURE — 87636 SARSCOV2 & INF A&B AMP PRB: CPT | Performed by: EMERGENCY MEDICINE

## 2023-02-28 PROCEDURE — 71045 X-RAY EXAM CHEST 1 VIEW: CPT

## 2023-02-28 PROCEDURE — 36415 COLL VENOUS BLD VENIPUNCTURE: CPT

## 2023-02-28 PROCEDURE — 80050 GENERAL HEALTH PANEL: CPT | Performed by: EMERGENCY MEDICINE

## 2023-02-28 PROCEDURE — 83690 ASSAY OF LIPASE: CPT | Performed by: EMERGENCY MEDICINE

## 2023-02-28 PROCEDURE — 83735 ASSAY OF MAGNESIUM: CPT | Performed by: EMERGENCY MEDICINE

## 2023-02-28 PROCEDURE — 25010000002 DIAZEPAM PER 5 MG: Performed by: FAMILY MEDICINE

## 2023-02-28 PROCEDURE — 82077 ASSAY SPEC XCP UR&BREATH IA: CPT | Performed by: EMERGENCY MEDICINE

## 2023-02-28 PROCEDURE — 63710000001 ONDANSETRON PER 8 MG: Performed by: FAMILY MEDICINE

## 2023-02-28 PROCEDURE — 99222 1ST HOSP IP/OBS MODERATE 55: CPT | Performed by: FAMILY MEDICINE

## 2023-02-28 PROCEDURE — 83880 ASSAY OF NATRIURETIC PEPTIDE: CPT | Performed by: EMERGENCY MEDICINE

## 2023-02-28 PROCEDURE — 99222 1ST HOSP IP/OBS MODERATE 55: CPT | Performed by: INTERNAL MEDICINE

## 2023-02-28 PROCEDURE — 25010000002 ONDANSETRON PER 1 MG: Performed by: EMERGENCY MEDICINE

## 2023-02-28 RX ORDER — LORAZEPAM 1 MG/1
2 TABLET ORAL
Status: DISCONTINUED | OUTPATIENT
Start: 2023-02-28 | End: 2023-03-02 | Stop reason: HOSPADM

## 2023-02-28 RX ORDER — LORAZEPAM 1 MG/1
4 TABLET ORAL
Status: DISCONTINUED | OUTPATIENT
Start: 2023-02-28 | End: 2023-03-02 | Stop reason: HOSPADM

## 2023-02-28 RX ORDER — HEPARIN SODIUM 5000 [USP'U]/ML
5000 INJECTION, SOLUTION INTRAVENOUS; SUBCUTANEOUS EVERY 12 HOURS SCHEDULED
Status: DISCONTINUED | OUTPATIENT
Start: 2023-02-28 | End: 2023-03-01 | Stop reason: ALTCHOICE

## 2023-02-28 RX ORDER — LORAZEPAM 2 MG/ML
2 INJECTION INTRAMUSCULAR
Status: DISCONTINUED | OUTPATIENT
Start: 2023-02-28 | End: 2023-03-02 | Stop reason: HOSPADM

## 2023-02-28 RX ORDER — SODIUM CHLORIDE 9 MG/ML
40 INJECTION, SOLUTION INTRAVENOUS AS NEEDED
Status: DISCONTINUED | OUTPATIENT
Start: 2023-02-28 | End: 2023-03-02 | Stop reason: HOSPADM

## 2023-02-28 RX ORDER — LORAZEPAM 2 MG/ML
4 INJECTION INTRAMUSCULAR
Status: DISCONTINUED | OUTPATIENT
Start: 2023-02-28 | End: 2023-03-02 | Stop reason: HOSPADM

## 2023-02-28 RX ORDER — ACETAMINOPHEN 650 MG/1
650 SUPPOSITORY RECTAL EVERY 4 HOURS PRN
Status: DISCONTINUED | OUTPATIENT
Start: 2023-02-28 | End: 2023-03-02 | Stop reason: HOSPADM

## 2023-02-28 RX ORDER — ACETAMINOPHEN 160 MG/5ML
650 SOLUTION ORAL EVERY 4 HOURS PRN
Status: DISCONTINUED | OUTPATIENT
Start: 2023-02-28 | End: 2023-03-02 | Stop reason: HOSPADM

## 2023-02-28 RX ORDER — ESMOLOL HYDROCHLORIDE 10 MG/ML
25-300 INJECTION, SOLUTION INTRAVENOUS
Status: DISCONTINUED | OUTPATIENT
Start: 2023-02-28 | End: 2023-03-02 | Stop reason: HOSPADM

## 2023-02-28 RX ORDER — SODIUM CHLORIDE 0.9 % (FLUSH) 0.9 %
10 SYRINGE (ML) INJECTION EVERY 12 HOURS SCHEDULED
Status: DISCONTINUED | OUTPATIENT
Start: 2023-02-28 | End: 2023-03-02 | Stop reason: HOSPADM

## 2023-02-28 RX ORDER — SODIUM CHLORIDE 0.9 % (FLUSH) 0.9 %
10 SYRINGE (ML) INJECTION AS NEEDED
Status: DISCONTINUED | OUTPATIENT
Start: 2023-02-28 | End: 2023-03-02 | Stop reason: HOSPADM

## 2023-02-28 RX ORDER — NICOTINE 21 MG/24HR
1 PATCH, TRANSDERMAL 24 HOURS TRANSDERMAL
Status: DISCONTINUED | OUTPATIENT
Start: 2023-02-28 | End: 2023-03-02 | Stop reason: HOSPADM

## 2023-02-28 RX ORDER — LISINOPRIL 10 MG/1
10 TABLET ORAL DAILY
Status: DISCONTINUED | OUTPATIENT
Start: 2023-02-28 | End: 2023-03-02 | Stop reason: HOSPADM

## 2023-02-28 RX ORDER — ACETAMINOPHEN 325 MG/1
650 TABLET ORAL EVERY 4 HOURS PRN
Status: DISCONTINUED | OUTPATIENT
Start: 2023-02-28 | End: 2023-03-02 | Stop reason: HOSPADM

## 2023-02-28 RX ORDER — ONDANSETRON 4 MG/1
4 TABLET, FILM COATED ORAL EVERY 6 HOURS PRN
Status: DISCONTINUED | OUTPATIENT
Start: 2023-02-28 | End: 2023-03-02 | Stop reason: HOSPADM

## 2023-02-28 RX ORDER — METOPROLOL TARTRATE 50 MG/1
50 TABLET, FILM COATED ORAL EVERY 12 HOURS SCHEDULED
Status: DISCONTINUED | OUTPATIENT
Start: 2023-02-28 | End: 2023-03-02 | Stop reason: HOSPADM

## 2023-02-28 RX ORDER — ONDANSETRON 2 MG/ML
4 INJECTION INTRAMUSCULAR; INTRAVENOUS EVERY 6 HOURS PRN
Status: DISCONTINUED | OUTPATIENT
Start: 2023-02-28 | End: 2023-03-02 | Stop reason: HOSPADM

## 2023-02-28 RX ORDER — DIAZEPAM 5 MG/ML
10 INJECTION, SOLUTION INTRAMUSCULAR; INTRAVENOUS EVERY 8 HOURS
Status: DISCONTINUED | OUTPATIENT
Start: 2023-02-28 | End: 2023-03-02 | Stop reason: HOSPADM

## 2023-02-28 RX ORDER — ASPIRIN 81 MG/1
324 TABLET, CHEWABLE ORAL ONCE
Status: DISCONTINUED | OUTPATIENT
Start: 2023-02-28 | End: 2023-03-02

## 2023-02-28 RX ORDER — ONDANSETRON 2 MG/ML
4 INJECTION INTRAMUSCULAR; INTRAVENOUS ONCE
Status: COMPLETED | OUTPATIENT
Start: 2023-02-28 | End: 2023-02-28

## 2023-02-28 RX ORDER — LORAZEPAM 1 MG/1
1 TABLET ORAL
Status: DISCONTINUED | OUTPATIENT
Start: 2023-02-28 | End: 2023-03-02 | Stop reason: HOSPADM

## 2023-02-28 RX ORDER — LORAZEPAM 2 MG/ML
1 INJECTION INTRAMUSCULAR ONCE
Status: COMPLETED | OUTPATIENT
Start: 2023-02-28 | End: 2023-02-28

## 2023-02-28 RX ORDER — LORAZEPAM 2 MG/ML
1 INJECTION INTRAMUSCULAR
Status: DISCONTINUED | OUTPATIENT
Start: 2023-02-28 | End: 2023-03-02 | Stop reason: HOSPADM

## 2023-02-28 RX ORDER — THIAMINE HYDROCHLORIDE 100 MG/ML
100 INJECTION, SOLUTION INTRAMUSCULAR; INTRAVENOUS ONCE
Status: COMPLETED | OUTPATIENT
Start: 2023-02-28 | End: 2023-02-28

## 2023-02-28 RX ADMIN — THIAMINE HCL TAB 100 MG 100 MG: 100 TAB at 16:10

## 2023-02-28 RX ADMIN — LORAZEPAM 1 MG: 1 TABLET ORAL at 16:09

## 2023-02-28 RX ADMIN — Medication 1 PATCH: at 21:41

## 2023-02-28 RX ADMIN — DIAZEPAM 10 MG: 5 INJECTION, SOLUTION INTRAMUSCULAR; INTRAVENOUS at 16:35

## 2023-02-28 RX ADMIN — ONDANSETRON 4 MG: 2 INJECTION INTRAMUSCULAR; INTRAVENOUS at 08:03

## 2023-02-28 RX ADMIN — LORAZEPAM 1 MG: 2 INJECTION INTRAMUSCULAR; INTRAVENOUS at 08:03

## 2023-02-28 RX ADMIN — THIAMINE HYDROCHLORIDE 1000 ML/HR: 100 INJECTION, SOLUTION INTRAMUSCULAR; INTRAVENOUS at 08:27

## 2023-02-28 RX ADMIN — ONDANSETRON HYDROCHLORIDE 4 MG: 4 TABLET, FILM COATED ORAL at 16:16

## 2023-02-28 RX ADMIN — IPRATROPIUM BROMIDE 0.5 MG: 0.5 SOLUTION RESPIRATORY (INHALATION) at 15:54

## 2023-02-28 RX ADMIN — ESMOLOL HYDROCHLORIDE 25 MCG/KG/MIN: 10 INJECTION INTRAVENOUS at 09:58

## 2023-02-28 RX ADMIN — LISINOPRIL 10 MG: 10 TABLET ORAL at 16:11

## 2023-02-28 RX ADMIN — Medication 10 ML: at 20:34

## 2023-02-28 RX ADMIN — HEPARIN SODIUM 5000 UNITS: 5000 INJECTION INTRAVENOUS; SUBCUTANEOUS at 20:31

## 2023-02-28 RX ADMIN — METOPROLOL TARTRATE 50 MG: 50 TABLET, FILM COATED ORAL at 16:11

## 2023-03-01 LAB
ALBUMIN SERPL-MCNC: 3.7 G/DL (ref 3.5–5.2)
ALBUMIN/GLOB SERPL: 2.3 G/DL
ALP SERPL-CCNC: 79 U/L (ref 39–117)
ALT SERPL W P-5'-P-CCNC: 26 U/L (ref 1–41)
ANION GAP SERPL CALCULATED.3IONS-SCNC: 11 MMOL/L (ref 5–15)
AST SERPL-CCNC: 31 U/L (ref 1–40)
BASOPHILS # BLD AUTO: 0.03 10*3/MM3 (ref 0–0.2)
BASOPHILS NFR BLD AUTO: 0.3 % (ref 0–1.5)
BILIRUB SERPL-MCNC: 1.5 MG/DL (ref 0–1.2)
BUN SERPL-MCNC: 11 MG/DL (ref 6–20)
BUN/CREAT SERPL: 15.5 (ref 7–25)
CALCIUM SPEC-SCNC: 8 MG/DL (ref 8.6–10.5)
CHLORIDE SERPL-SCNC: 99 MMOL/L (ref 98–107)
CO2 SERPL-SCNC: 26 MMOL/L (ref 22–29)
CREAT SERPL-MCNC: 0.71 MG/DL (ref 0.76–1.27)
DEPRECATED RDW RBC AUTO: 40.5 FL (ref 37–54)
EGFRCR SERPLBLD CKD-EPI 2021: 111.8 ML/MIN/1.73
EOSINOPHIL # BLD AUTO: 0.3 10*3/MM3 (ref 0–0.4)
EOSINOPHIL NFR BLD AUTO: 3.1 % (ref 0.3–6.2)
ERYTHROCYTE [DISTWIDTH] IN BLOOD BY AUTOMATED COUNT: 12.3 % (ref 12.3–15.4)
GLOBULIN UR ELPH-MCNC: 1.6 GM/DL
GLUCOSE SERPL-MCNC: 82 MG/DL (ref 65–99)
HCT VFR BLD AUTO: 42.3 % (ref 37.5–51)
HGB BLD-MCNC: 14.8 G/DL (ref 13–17.7)
IMM GRANULOCYTES # BLD AUTO: 0.03 10*3/MM3 (ref 0–0.05)
IMM GRANULOCYTES NFR BLD AUTO: 0.3 % (ref 0–0.5)
LYMPHOCYTES # BLD AUTO: 1.96 10*3/MM3 (ref 0.7–3.1)
LYMPHOCYTES NFR BLD AUTO: 20.1 % (ref 19.6–45.3)
MAGNESIUM SERPL-MCNC: 1.7 MG/DL (ref 1.6–2.6)
MCH RBC QN AUTO: 31.4 PG (ref 26.6–33)
MCHC RBC AUTO-ENTMCNC: 35 G/DL (ref 31.5–35.7)
MCV RBC AUTO: 89.8 FL (ref 79–97)
MONOCYTES # BLD AUTO: 0.62 10*3/MM3 (ref 0.1–0.9)
MONOCYTES NFR BLD AUTO: 6.4 % (ref 5–12)
NEUTROPHILS NFR BLD AUTO: 6.8 10*3/MM3 (ref 1.7–7)
NEUTROPHILS NFR BLD AUTO: 69.8 % (ref 42.7–76)
NRBC BLD AUTO-RTO: 0 /100 WBC (ref 0–0.2)
PLATELET # BLD AUTO: 201 10*3/MM3 (ref 140–450)
PMV BLD AUTO: 9.2 FL (ref 6–12)
POTASSIUM SERPL-SCNC: 4 MMOL/L (ref 3.5–5.2)
PROT SERPL-MCNC: 5.3 G/DL (ref 6–8.5)
RBC # BLD AUTO: 4.71 10*6/MM3 (ref 4.14–5.8)
SODIUM SERPL-SCNC: 136 MMOL/L (ref 136–145)
WBC NRBC COR # BLD: 9.74 10*3/MM3 (ref 3.4–10.8)

## 2023-03-01 PROCEDURE — 83735 ASSAY OF MAGNESIUM: CPT | Performed by: INTERNAL MEDICINE

## 2023-03-01 PROCEDURE — 85025 COMPLETE CBC W/AUTO DIFF WBC: CPT | Performed by: FAMILY MEDICINE

## 2023-03-01 PROCEDURE — 94799 UNLISTED PULMONARY SVC/PX: CPT

## 2023-03-01 PROCEDURE — 94761 N-INVAS EAR/PLS OXIMETRY MLT: CPT

## 2023-03-01 PROCEDURE — 25010000002 METHYLPREDNISOLONE PER 40 MG: Performed by: INTERNAL MEDICINE

## 2023-03-01 PROCEDURE — 94664 DEMO&/EVAL PT USE INHALER: CPT

## 2023-03-01 PROCEDURE — 25010000002 HEPARIN (PORCINE) PER 1000 UNITS: Performed by: FAMILY MEDICINE

## 2023-03-01 PROCEDURE — 25010000002 LORAZEPAM PER 2 MG: Performed by: FAMILY MEDICINE

## 2023-03-01 PROCEDURE — 25010000002 CEFTRIAXONE PER 250 MG

## 2023-03-01 PROCEDURE — 0 MAGNESIUM SULFATE 4 GM/100ML SOLUTION

## 2023-03-01 PROCEDURE — 99233 SBSQ HOSP IP/OBS HIGH 50: CPT | Performed by: INTERNAL MEDICINE

## 2023-03-01 PROCEDURE — 80053 COMPREHEN METABOLIC PANEL: CPT | Performed by: FAMILY MEDICINE

## 2023-03-01 PROCEDURE — 25010000002 DIAZEPAM PER 5 MG: Performed by: FAMILY MEDICINE

## 2023-03-01 PROCEDURE — 99232 SBSQ HOSP IP/OBS MODERATE 35: CPT | Performed by: INTERNAL MEDICINE

## 2023-03-01 PROCEDURE — 25010000002 ENOXAPARIN PER 10 MG: Performed by: INTERNAL MEDICINE

## 2023-03-01 RX ORDER — GUAIFENESIN/DEXTROMETHORPHAN 100-10MG/5
5 SYRUP ORAL EVERY 4 HOURS PRN
Status: DISCONTINUED | OUTPATIENT
Start: 2023-03-01 | End: 2023-03-02 | Stop reason: HOSPADM

## 2023-03-01 RX ORDER — METHYLPREDNISOLONE SODIUM SUCCINATE 40 MG/ML
20 INJECTION, POWDER, LYOPHILIZED, FOR SOLUTION INTRAMUSCULAR; INTRAVENOUS EVERY 12 HOURS
Status: DISCONTINUED | OUTPATIENT
Start: 2023-03-01 | End: 2023-03-02

## 2023-03-01 RX ORDER — MAGNESIUM SULFATE HEPTAHYDRATE 40 MG/ML
4 INJECTION, SOLUTION INTRAVENOUS ONCE
Status: COMPLETED | OUTPATIENT
Start: 2023-03-01 | End: 2023-03-01

## 2023-03-01 RX ORDER — IPRATROPIUM BROMIDE AND ALBUTEROL SULFATE 2.5; .5 MG/3ML; MG/3ML
3 SOLUTION RESPIRATORY (INHALATION)
Status: DISCONTINUED | OUTPATIENT
Start: 2023-03-01 | End: 2023-03-02 | Stop reason: HOSPADM

## 2023-03-01 RX ORDER — FOLIC ACID 1 MG/1
1 TABLET ORAL DAILY
Status: DISCONTINUED | OUTPATIENT
Start: 2023-03-01 | End: 2023-03-02 | Stop reason: HOSPADM

## 2023-03-01 RX ORDER — DIPHENOXYLATE HYDROCHLORIDE AND ATROPINE SULFATE 2.5; .025 MG/1; MG/1
1 TABLET ORAL DAILY
Status: DISCONTINUED | OUTPATIENT
Start: 2023-03-01 | End: 2023-03-02 | Stop reason: HOSPADM

## 2023-03-01 RX ORDER — ENOXAPARIN SODIUM 100 MG/ML
40 INJECTION SUBCUTANEOUS EVERY 24 HOURS
Status: DISCONTINUED | OUTPATIENT
Start: 2023-03-01 | End: 2023-03-02 | Stop reason: HOSPADM

## 2023-03-01 RX ADMIN — THIAMINE HCL TAB 100 MG 100 MG: 100 TAB at 08:49

## 2023-03-01 RX ADMIN — METOPROLOL TARTRATE 50 MG: 50 TABLET, FILM COATED ORAL at 08:49

## 2023-03-01 RX ADMIN — MULTIVITAMIN TABLET 1 TABLET: TABLET at 08:49

## 2023-03-01 RX ADMIN — MAGNESIUM SULFATE HEPTAHYDRATE 4 G: 40 INJECTION, SOLUTION INTRAVENOUS at 14:54

## 2023-03-01 RX ADMIN — METOPROLOL TARTRATE 50 MG: 50 TABLET, FILM COATED ORAL at 19:59

## 2023-03-01 RX ADMIN — HEPARIN SODIUM 5000 UNITS: 5000 INJECTION INTRAVENOUS; SUBCUTANEOUS at 08:49

## 2023-03-01 RX ADMIN — LISINOPRIL 10 MG: 10 TABLET ORAL at 08:49

## 2023-03-01 RX ADMIN — IPRATROPIUM BROMIDE 0.5 MG: 0.5 SOLUTION RESPIRATORY (INHALATION) at 13:13

## 2023-03-01 RX ADMIN — DIAZEPAM 10 MG: 5 INJECTION, SOLUTION INTRAMUSCULAR; INTRAVENOUS at 08:48

## 2023-03-01 RX ADMIN — IPRATROPIUM BROMIDE 0.5 MG: 0.5 SOLUTION RESPIRATORY (INHALATION) at 08:52

## 2023-03-01 RX ADMIN — DIAZEPAM 10 MG: 5 INJECTION, SOLUTION INTRAMUSCULAR; INTRAVENOUS at 16:56

## 2023-03-01 RX ADMIN — ENOXAPARIN SODIUM 40 MG: 40 INJECTION SUBCUTANEOUS at 17:06

## 2023-03-01 RX ADMIN — LORAZEPAM 1 MG: 1 TABLET ORAL at 04:17

## 2023-03-01 RX ADMIN — IPRATROPIUM BROMIDE AND ALBUTEROL SULFATE 3 ML: 2.5; .5 SOLUTION RESPIRATORY (INHALATION) at 20:48

## 2023-03-01 RX ADMIN — FOLIC ACID 1 MG: 1 TABLET ORAL at 08:49

## 2023-03-01 RX ADMIN — DOXYCYCLINE 100 MG: 100 INJECTION, POWDER, LYOPHILIZED, FOR SOLUTION INTRAVENOUS at 16:55

## 2023-03-01 RX ADMIN — Medication 10 ML: at 08:57

## 2023-03-01 RX ADMIN — METHYLPREDNISOLONE SODIUM SUCCINATE 20 MG: 40 INJECTION, POWDER, FOR SOLUTION INTRAMUSCULAR; INTRAVENOUS at 14:54

## 2023-03-01 RX ADMIN — DIAZEPAM 10 MG: 5 INJECTION, SOLUTION INTRAMUSCULAR; INTRAVENOUS at 00:32

## 2023-03-01 RX ADMIN — LORAZEPAM 1 MG: 2 INJECTION INTRAMUSCULAR; INTRAVENOUS at 14:54

## 2023-03-01 RX ADMIN — CEFTRIAXONE SODIUM 2 G: 2 INJECTION, POWDER, FOR SOLUTION INTRAMUSCULAR; INTRAVENOUS at 15:49

## 2023-03-01 RX ADMIN — IPRATROPIUM BROMIDE AND ALBUTEROL SULFATE 3 ML: 2.5; .5 SOLUTION RESPIRATORY (INHALATION) at 16:25

## 2023-03-01 RX ADMIN — Medication 10 ML: at 19:59

## 2023-03-01 NOTE — PROGRESS NOTES
[facility] Medicine Services  PROGRESS NOTE    Patient Name: Leonard Crandall  : 1970  MRN: [id number]    Date of Admission: 2023  Primary Care Physician: Jhon Khoury PA    Subjective   Subjective     CC:  Copd/bronchitis/etoh w/d/afib    HPI:  No hallucinations, minimal anxiety  No n/v/d  Cough, wheezing, mild dyspnea, yellow sputum, mid and right sided chest wall pain w/ coughing or movements. Denies trauma  No fever    ROS:  Cough, yellow sputum  Mid and right sided chest wall pain w/ coughing or movements  No dysuria  No n/v/d  No LE edema  No headache  No palpitations    Objective   Objective     Vital Signs:   Temp:  [97.7 °F (36.5 °C)-98.9 °F (37.2 °C)] 98.2 °F (36.8 °C)  Heart Rate:  [62-91] 71  Resp:  [16-18] 18  BP: (100-146)/() 111/67  Flow (L/min):  [2-3] 2     Physical Exam:  Constitutional:Alert, oriented x 3, ill but nontoxic appearing, no respiratory distress, nasal canula in place  Psych:Normal/appropriate affect  HEENT:NCAT, oropharynx clear  Neck: neck supple, full range of motion  Neuro: Face symmetric, speech clear, equal , moves all extremities  Cardiac: RRR; No pretibial pitting edema  Resp: faint scattered bilateral rhonchi and exp wheezes, no distress  GI: abd soft, nontender  Skin: No extremity rash  Musculoskeletal/extremities: reproducable mid sternal chest tenderness to palpation; no crepitus          Results Reviewed:  LAB RESULTS:      Lab 23  0738 23  0800 23  1506   WBC 9.74 9.78 8.01   HEMOGLOBIN 14.8 16.7 16.5   HEMATOCRIT 42.3 47.1 46.9   PLATELETS 201 254 235   NEUTROS ABS 6.80 7.12*  --    IMMATURE GRANS (ABS) 0.03 0.02  --    LYMPHS ABS 1.96 1.75  --    MONOS ABS 0.62 0.74  --    EOS ABS 0.30 0.09  --    MCV 89.8 89.0 89   LACTATE, ARTERIAL  --   --  2.5*         Lab 23  0738 23  0800   SODIUM 136 141   POTASSIUM 4.0 4.1   CHLORIDE 99 100   CO2 26.0 23.0   ANION GAP 11.0 18.0*   BUN 11 7    CREATININE 0.71* 0.82   EGFR 111.8 107.0   GLUCOSE 82 118*   CALCIUM 8.0* 8.7   MAGNESIUM 1.7 2.1   TSH  --  0.719         Lab 03/01/23  0738 02/28/23  0800 02/27/23  1506   TOTAL PROTEIN 5.3* 6.6  --    ALBUMIN 3.7 4.4  --    GLOBULIN 1.6 2.2  --    ALT (SGPT) 26 41  --    AST (SGOT) 31 50*  --    BILIRUBIN 1.5* 1.3*  --    ALK PHOS 79 88  --    LIPASE  --  36 79*         Lab 02/28/23  1231 02/28/23  0821 02/28/23  0800 02/27/23  1711 02/27/23  1506   PROBNP  --   --  13.9  --   --    TROPONIN T  --   --   --  7  --    TROPONIN T (HIGHLY SENSITIVE)  --   --   --   --  8   HSTROP T 6 <6  --   --   --                  Brief Urine Lab Results     None          Microbiology Results Abnormal     Procedure Component Value - Date/Time    COVID-19 and FLU A/B PCR - Swab, Nasopharynx [[id number]]  (Normal) Collected: 02/28/23 0803    Lab Status: Final result Specimen: Swab from Nasopharynx Updated: 02/28/23 0849     COVID19 Not Detected     Influenza A PCR Not Detected     Influenza B PCR Not Detected    Narrative:      Fact sheet for providers: https://www.fda.gov/media/235504/download    Fact sheet for patients: https://www.fda.gov/media/192893/download    Test performed by PCR.          XR Chest 2 View    Result Date: 2/27/2023  Exam/Procedure: XR CHEST 2 VIEWS ordered by JERRY BAILEY, [id number] CLINICAL INDICATION: Chest pain TECHNIQUE: XR CHEST 2 VIEWS COMPARISON: None. FINDINGS: Decreased inspiratory volume. Bibasilar atelectasis, no focal dense consolidation, or evidence of a significant effusion. Cardiac size upper limits of normal. No gross free subdiaphragmatic gas. No acute osseous findings.    Impression: Bibasilar atelectasis, otherwise no acute findings. CRITICAL RESULT:   No. COMMUNICATION: Per this written report. Dictated by Gallo Dasilva MD on 2/27/2023 3:47 PM Signed by Gallo Dasilva MD on 2/27/2023 3:48 PM    XR Chest 1 View    Result Date: 2/28/2023  XR CHEST 1 VW Date of Exam: 2/28/2023 8:10 AM EST  Indication: Chest Pain Triage Protocol. Comparison: CT chest from November 1, 2022 Findings: There is a left basilar opacity. The heart and mediastinal contours appear normal. The pulmonary vasculature appears normal. The osseous structures appear intact.     Impression: Impression: Left basilar opacity, which could reflect atelectasis or pneumonia. Electronically Signed: Esteban Niles  2/28/2023 8:48 AM EST  Workstation ID: [id number]          Current medications:  Scheduled Meds:aspirin, 324 mg, Oral, Once  cefTRIAXone, 1 g, Intravenous, Q24H  diazePAM, 10 mg, Intravenous, Q8H  doxycycline, 100 mg, Intravenous, Q12H  enoxaparin, 40 mg, Subcutaneous, Q24H  folic acid, 1 mg, Oral, Daily  ipratropium, 0.5 mg, Nebulization, 4x Daily - RT  ipratropium-albuterol, 3 mL, Nebulization, 4x Daily - RT  lisinopril, 10 mg, Oral, Daily  magnesium sulfate, 4 g, Intravenous, Once  methylPREDNISolone sodium succinate, 20 mg, Intravenous, Q12H  metoprolol tartrate, 50 mg, Oral, Q12H  multivitamin, 1 tablet, Oral, Daily  nicotine, 1 patch, Transdermal, Q24H  sodium chloride, 10 mL, Intravenous, Q12H  thiamine, 100 mg, Oral, Daily      Continuous Infusions:esmolol,  mcg/kg/min, Last Rate: Stopped (02/28/23 1702)      PRN Meds:.•  acetaminophen **OR** acetaminophen **OR** acetaminophen  •  guaiFENesin-dextromethorphan  •  LORazepam **OR** LORazepam **OR** LORazepam **OR** LORazepam **OR** LORazepam **OR** LORazepam **OR** LORazepam **OR** LORazepam  •  Magnesium Standard Dose Replacement - Initiate Nurse / BPA Driven Protocol  •  ondansetron **OR** ondansetron  •  sodium chloride  •  sodium chloride  •  sodium chloride    Assessment & Plan   Assessment & Plan     Active Hospital Problems    Diagnosis  POA   • **Atrial flutter with rapid ventricular response (HCC) [I48.92]  Yes   • A-fib (HCC) [I48.91]  Yes   • Tobacco abuse [Z72.0]  Yes   • COPD (chronic obstructive pulmonary disease) (HCC) [J44.9]  Yes   • Alcoholism (HCC)  [F10.20]  Yes      Resolved Hospital Problems   No resolved problems to display.        Brief Hospital Course to date:  Leonard Crandall is a 52 y.o. male smoker w/ copd and etoh abuse presented from [facility] for medical clearance due to cough, chest pain. Was noted afib rvr. Cards consulted. Converted to sinus rhythm. Echo was normal, tsh wnl. Initiated on scheduled valium and ciwa checks w/ prn ativan. Initiated on rocephin/doxy on 3/1/23 along w/ solumedrol and duonebs due to wheezing and productive cough.     ETOH abuse & withdrawal  -sent from [facility] for medical clearance due to chest pain  -admits to drinking 2 fifths per day; has hx previous delirium tremens  -continue valium 10mg tid, ciwa checks w/ prn ativan  -mv/thiamine/folic acid  -addiction medicine consult pending    Transient Afib w/ RVR, converted to sinus rhythm  Atypical chest pain (felt due to pleurisy/coughing/copd)  - in setting of heavy alcohol use and withdrawal  -echo normal LV EF, no significant mitral dz  -cards following: [id number] 1 for htn: continue asa, metoprolol; no further cardiac testing at this time, follow up cards 4-8 weeks    Acute hypoxic resp failure (due to below)  COPD w/ acute exacerbation/bronchospasm  Bronchitis  Chest wall pain (pleuritic and reproducable w/ deep palpation) likely muscular strain  Tobacco abuse  -covid 19/flu pcr negative  -yellow sputum  -start rocephin & doxy day #1/5 (could change to ceftin/po doxy); solumedrol 20mg iv bid; duonebs qid; deescalate steroids and nebs quickly once wheezing improved  -required 3Lnc last night, weaned to 1Lnc currently and continue to wean as able    Am labs: cbc,bmp,mag    Expected Discharge Location and Transportation: ? Home (vs inpatient substance rehab)  Expected Discharge ? 3/4/23 (depending on clinical course, addiction recs)  Expected Discharge Date and Time     Expected Discharge Date Expected Discharge Time    Mar 3, 2023            DVT  prophylaxis:  Medical DVT prophylaxis orders are present. sq lovenox         CODE STATUS:   Code Status and Medical Interventions:   Ordered at: 02/28/23 1437     Code Status (Patient has no pulse and is not breathing):    CPR (Attempt to Resuscitate)     Medical Interventions (Patient has pulse or is breathing):    Full Support       Bucky Herrera MD  03/01/23

## 2023-03-01 NOTE — CASE MANAGEMENT/SOCIAL WORK
Continued Stay Note   [place]     Patient Name: Leonard Crandall  MRN: [id number]  Today's Date: 3/1/2023    Admit Date: 2/28/2023    Plan: update   Discharge Plan     Row Name 03/01/23 1231       Plan    Plan update    Patient/Family in Agreement with Plan yes    Plan Comments Per MDR, patient came from [facility] and would like to return there, MD request consult for Chemical Dependency.  Spoke with patient at bedside regarding discharge plan.  Patient confirms that he would like to return to [facility].  No other discharge needs/request verbalized at this time.  CM following.  Patient plan is to discharge back to [facility].  Called place to Chemical Dependency RN regardign consult.    Final Discharge Disposition Code 65 - psychiatric hospital or unit               Discharge Codes    No documentation.               Expected Discharge Date and Time     Expected Discharge Date Expected Discharge Time    Mar 3, 2023             Shaila Patterson, RN

## 2023-03-01 NOTE — CASE MANAGEMENT/SOCIAL WORK
Continued Stay Note  [facility]     Patient Name: Leonard Crandall  MRN: [id number]  Today's Date: 3/1/2023    Admit Date: 2/28/2023    Plan: Ongoing   Discharge Plan     Row Name 03/01/23 1716       Plan    Plan Ongoing    Plan Comments Consult received, I have seen this patient in the past, on one other occasion- treatment resources were provided.  This admission,pt presents from [facility]- his BAL was 378  and he was experiencing CP.  CIWAs reviewed- range 2-18.  Will assess and determine what level of care for his AUD he would like to pursue, post hospital stabilization.               Discharge Codes    No documentation.               Expected Discharge Date and Time     Expected Discharge Date Expected Discharge Time    Mar 3, 2023             Emy Covarrubias RN  MA,BSN-  Addiction Coordinator

## 2023-03-01 NOTE — PROGRESS NOTES
[facility] Cardiology  Inpatient Progress Note      Chief Complaint/Reason for consult:    Chief Complaint   Patient presents with   • Chest Pain            Subjective  Feeling short winded at times, no recurrent chest pain or palpitations       Vital Sign Min/Max for last 24 hours  Temp  Min: 97.7 °F (36.5 °C)  Max: 98.9 °F (37.2 °C)   BP  Min: 100/59  Max: 151/108   Pulse  Min: 62  Max: 179   Resp  Min: 16  Max: 18   SpO2  Min: 90 %  Max: 98 %   Flow (L/min)  Min: 3  Max: 4      Intake/Output Summary (Last 24 hours) at 3/1/2023 0841  Last data filed at 2/28/2023 2000  Gross per 24 hour   Intake 150 ml   Output --   Net 150 ml           Gen: well developed, lying in bed, comfortable appearing  HEENT: MMM, sclerae anicteric, conjunctivae normal  CV: regular rate, regular rhythm, no murmurs or rubs, normal S1, S2. 2+ radial and DP pulses  Pulm: RA, normal work of breathing, no wheezes, rales, rhonchi  Ext: normal bulk for age, normal tone, no dependent edema  Neuro: alert, oriented, face symmetrical, moving all extremities well  Psych:  flat affect    Tele:  SR, ST overnight, no recurrent afib/flutter seen    Results Review (reviewed the patient's recent labs in the electronic medical record):     Radiology:    XR Chest 2 View    Result Date: 2/27/2023  Bibasilar atelectasis, otherwise no acute findings. CRITICAL RESULT:   No. COMMUNICATION: Per this written report. Dictated by Gallo Dasilva MD on 2/27/2023 3:47 PM Signed by Gallo Dasilva MD on 2/27/2023 3:48 PM    XR Chest 1 View    Result Date: 2/28/2023  Impression: Left basilar opacity, which could reflect atelectasis or pneumonia. Electronically Signed: Esteban Cage  2/28/2023 8:48 AM EST  Workstation ID: [id number]      Lab Results   Component Value Date    WBC 9.74 03/01/2023    HGB 14.8 03/01/2023    HCT 42.3 03/01/2023    MCV 89.8 03/01/2023     03/01/2023     Lab Results   Component Value Date    GLUCOSE 118 (H) 02/28/2023    CALCIUM  8.7 02/28/2023     02/28/2023    K 4.1 02/28/2023    CO2 23.0 02/28/2023     02/28/2023    BUN 7 02/28/2023    CREATININE 0.82 02/28/2023    EGFRIFAFRI >60 07/22/2022    EGFRIFNONA >60 07/22/2022    BCR 8.5 02/28/2023    ANIONGAP 18.0 (H) 02/28/2023     Lab Results   Component Value Date    TSH 0.719 02/28/2023      Assessment     PAF in the setting of heavy alcohol use and withdrawal   - TTE with normal LVEF, no significant mitral disease   - continue BB   - [id number] 1 for hypertension, will continue with aspirin alone   - telemetry while admitted    - no additional cardiac testing at this time, can follow-up in cardiology clinic in 4-8 weeks post discharge      LESVIA Freed MD  3/1/2023  08:41 EST

## 2023-03-02 ENCOUNTER — READMISSION MANAGEMENT (OUTPATIENT)
Dept: CALL CENTER | Facility: HOSPITAL | Age: 53
End: 2023-03-02
Payer: MEDICAID

## 2023-03-02 VITALS
SYSTOLIC BLOOD PRESSURE: 142 MMHG | HEART RATE: 83 BPM | TEMPERATURE: 98.1 F | HEIGHT: 74 IN | DIASTOLIC BLOOD PRESSURE: 86 MMHG | OXYGEN SATURATION: 92 % | BODY MASS INDEX: 30.8 KG/M2 | RESPIRATION RATE: 18 BRPM | WEIGHT: 240 LBS

## 2023-03-02 LAB
ANION GAP SERPL CALCULATED.3IONS-SCNC: 11 MMOL/L (ref 5–15)
ASCENDING AORTA: 3.7 CM
BH CV ECHO MEAS - AO MAX PG: 3.5 MMHG
BH CV ECHO MEAS - AO MEAN PG: 2 MMHG
BH CV ECHO MEAS - AO ROOT DIAM: 3.3 CM
BH CV ECHO MEAS - AO V2 MAX: 94.1 CM/SEC
BH CV ECHO MEAS - AO V2 VTI: 17.7 CM
BH CV ECHO MEAS - AVA(I,D): 2.9 CM2
BH CV ECHO MEAS - EDV(CUBED): 110.6 ML
BH CV ECHO MEAS - EDV(MOD-SP2): 105 ML
BH CV ECHO MEAS - EDV(MOD-SP4): 148 ML
BH CV ECHO MEAS - EF(MOD-BP): 56.8 %
BH CV ECHO MEAS - EF(MOD-SP2): 54.8 %
BH CV ECHO MEAS - EF(MOD-SP4): 58.2 %
BH CV ECHO MEAS - ESV(CUBED): 54.9 ML
BH CV ECHO MEAS - ESV(MOD-SP2): 47.5 ML
BH CV ECHO MEAS - ESV(MOD-SP4): 61.9 ML
BH CV ECHO MEAS - FS: 20.8 %
BH CV ECHO MEAS - IVS/LVPW: 1.11 CM
BH CV ECHO MEAS - IVSD: 1 CM
BH CV ECHO MEAS - LA DIMENSION: 3.9 CM
BH CV ECHO MEAS - LAT PEAK E' VEL: 8.8 CM/SEC
BH CV ECHO MEAS - LV MASS(C)D: 158.8 GRAMS
BH CV ECHO MEAS - LV MAX PG: 2.8 MMHG
BH CV ECHO MEAS - LV MEAN PG: 1 MMHG
BH CV ECHO MEAS - LV V1 MAX: 83.3 CM/SEC
BH CV ECHO MEAS - LV V1 VTI: 14.9 CM
BH CV ECHO MEAS - LVIDD: 4.8 CM
BH CV ECHO MEAS - LVIDS: 3.8 CM
BH CV ECHO MEAS - LVOT AREA: 3.5 CM2
BH CV ECHO MEAS - LVOT DIAM: 2.1 CM
BH CV ECHO MEAS - LVPWD: 0.9 CM
BH CV ECHO MEAS - MED PEAK E' VEL: 7 CM/SEC
BH CV ECHO MEAS - MV A MAX VEL: 60.8 CM/SEC
BH CV ECHO MEAS - MV DEC TIME: 0.33 MSEC
BH CV ECHO MEAS - MV E MAX VEL: 54.5 CM/SEC
BH CV ECHO MEAS - MV E/A: 0.9
BH CV ECHO MEAS - PA ACC TIME: 0.16 SEC
BH CV ECHO MEAS - PA PR(ACCEL): 9.3 MMHG
BH CV ECHO MEAS - PA V2 MAX: 101 CM/SEC
BH CV ECHO MEAS - RAP SYSTOLE: 3 MMHG
BH CV ECHO MEAS - RVSP: 20 MMHG
BH CV ECHO MEAS - SV(LVOT): 51.6 ML
BH CV ECHO MEAS - SV(MOD-SP2): 57.5 ML
BH CV ECHO MEAS - SV(MOD-SP4): 86.1 ML
BH CV ECHO MEAS - TAPSE (>1.6): 1.98 CM
BH CV ECHO MEAS - TR MAX PG: 16.5 MMHG
BH CV ECHO MEAS - TR MAX VEL: 201 CM/SEC
BH CV ECHO MEASUREMENTS AVERAGE E/E' RATIO: 6.9
BH CV XLRA - RV BASE: 3.7 CM
BH CV XLRA - RV LENGTH: 7.5 CM
BH CV XLRA - RV MID: 2.6 CM
BH CV XLRA - TDI S': 15.4 CM/SEC
BUN SERPL-MCNC: 11 MG/DL (ref 6–20)
BUN/CREAT SERPL: 14.3 (ref 7–25)
CALCIUM SPEC-SCNC: 8.5 MG/DL (ref 8.6–10.5)
CHLORIDE SERPL-SCNC: 100 MMOL/L (ref 98–107)
CO2 SERPL-SCNC: 24 MMOL/L (ref 22–29)
CREAT SERPL-MCNC: 0.77 MG/DL (ref 0.76–1.27)
DEPRECATED RDW RBC AUTO: 39.6 FL (ref 37–54)
EGFRCR SERPLBLD CKD-EPI 2021: 109.1 ML/MIN/1.73
ERYTHROCYTE [DISTWIDTH] IN BLOOD BY AUTOMATED COUNT: 12.1 % (ref 12.3–15.4)
GLUCOSE SERPL-MCNC: 120 MG/DL (ref 65–99)
HCT VFR BLD AUTO: 45.4 % (ref 37.5–51)
HGB BLD-MCNC: 16.2 G/DL (ref 13–17.7)
LEFT ATRIUM VOLUME INDEX: 20.6 ML/M2
MAGNESIUM SERPL-MCNC: 2.3 MG/DL (ref 1.6–2.6)
MAXIMAL PREDICTED HEART RATE: 170 BPM
MCH RBC QN AUTO: 31.5 PG (ref 26.6–33)
MCHC RBC AUTO-ENTMCNC: 35.7 G/DL (ref 31.5–35.7)
MCV RBC AUTO: 88.2 FL (ref 79–97)
PLATELET # BLD AUTO: 211 10*3/MM3 (ref 140–450)
PMV BLD AUTO: 9.4 FL (ref 6–12)
POTASSIUM SERPL-SCNC: 4.6 MMOL/L (ref 3.5–5.2)
RBC # BLD AUTO: 5.15 10*6/MM3 (ref 4.14–5.8)
SODIUM SERPL-SCNC: 135 MMOL/L (ref 136–145)
STRESS TARGET HR: 145 BPM
WBC NRBC COR # BLD: 11.61 10*3/MM3 (ref 3.4–10.8)

## 2023-03-02 PROCEDURE — 25010000002 METHYLPREDNISOLONE PER 40 MG: Performed by: INTERNAL MEDICINE

## 2023-03-02 PROCEDURE — 85027 COMPLETE CBC AUTOMATED: CPT | Performed by: INTERNAL MEDICINE

## 2023-03-02 PROCEDURE — 94799 UNLISTED PULMONARY SVC/PX: CPT

## 2023-03-02 PROCEDURE — 25010000002 DIAZEPAM PER 5 MG: Performed by: FAMILY MEDICINE

## 2023-03-02 PROCEDURE — 99232 SBSQ HOSP IP/OBS MODERATE 35: CPT | Performed by: NURSE PRACTITIONER

## 2023-03-02 PROCEDURE — 83735 ASSAY OF MAGNESIUM: CPT

## 2023-03-02 PROCEDURE — 99232 SBSQ HOSP IP/OBS MODERATE 35: CPT | Performed by: INTERNAL MEDICINE

## 2023-03-02 PROCEDURE — 80048 BASIC METABOLIC PNL TOTAL CA: CPT | Performed by: INTERNAL MEDICINE

## 2023-03-02 RX ORDER — PREDNISONE 20 MG/1
20 TABLET ORAL
Status: DISCONTINUED | OUTPATIENT
Start: 2023-03-05 | End: 2023-03-02 | Stop reason: HOSPADM

## 2023-03-02 RX ORDER — ASPIRIN 81 MG/1
81 TABLET ORAL DAILY
Status: DISCONTINUED | OUTPATIENT
Start: 2023-03-02 | End: 2023-03-02 | Stop reason: HOSPADM

## 2023-03-02 RX ORDER — PREDNISONE 10 MG/1
10 TABLET ORAL
Status: DISCONTINUED | OUTPATIENT
Start: 2023-03-07 | End: 2023-03-02 | Stop reason: HOSPADM

## 2023-03-02 RX ORDER — PREDNISONE 20 MG/1
40 TABLET ORAL
Status: DISCONTINUED | OUTPATIENT
Start: 2023-03-03 | End: 2023-03-02 | Stop reason: HOSPADM

## 2023-03-02 RX ADMIN — METOPROLOL TARTRATE 50 MG: 50 TABLET, FILM COATED ORAL at 08:45

## 2023-03-02 RX ADMIN — IPRATROPIUM BROMIDE AND ALBUTEROL SULFATE 3 ML: 2.5; .5 SOLUTION RESPIRATORY (INHALATION) at 08:03

## 2023-03-02 RX ADMIN — MULTIVITAMIN TABLET 1 TABLET: TABLET at 08:45

## 2023-03-02 RX ADMIN — Medication 10 ML: at 08:48

## 2023-03-02 RX ADMIN — DIAZEPAM 10 MG: 5 INJECTION, SOLUTION INTRAMUSCULAR; INTRAVENOUS at 00:42

## 2023-03-02 RX ADMIN — LISINOPRIL 10 MG: 10 TABLET ORAL at 08:44

## 2023-03-02 RX ADMIN — DIAZEPAM 10 MG: 5 INJECTION, SOLUTION INTRAMUSCULAR; INTRAVENOUS at 08:44

## 2023-03-02 RX ADMIN — IPRATROPIUM BROMIDE AND ALBUTEROL SULFATE 3 ML: 2.5; .5 SOLUTION RESPIRATORY (INHALATION) at 12:29

## 2023-03-02 RX ADMIN — THIAMINE HCL TAB 100 MG 100 MG: 100 TAB at 08:45

## 2023-03-02 RX ADMIN — DOXYCYCLINE 100 MG: 100 INJECTION, POWDER, LYOPHILIZED, FOR SOLUTION INTRAVENOUS at 03:04

## 2023-03-02 RX ADMIN — FOLIC ACID 1 MG: 1 TABLET ORAL at 08:45

## 2023-03-02 RX ADMIN — ASPIRIN 81 MG: 81 TABLET, COATED ORAL at 12:54

## 2023-03-02 RX ADMIN — METHYLPREDNISOLONE SODIUM SUCCINATE 20 MG: 40 INJECTION, POWDER, FOR SOLUTION INTRAMUSCULAR; INTRAVENOUS at 03:03

## 2023-03-02 RX ADMIN — Medication 1 PATCH: at 08:44

## 2023-03-02 NOTE — CASE MANAGEMENT/SOCIAL WORK
Continued Stay Note  [facility]     Patient Name: Leonard Crandall  MRN: [id number]  Today's Date: 3/2/2023    Admit Date: 2/28/2023    Plan: [facility]   Discharge Plan     Row Name 03/02/23 1314       Plan    Plan Lik[facility]    Plan Comments Met with this gentleman this afternoon.  CIWAs reviewed- range today 2-7.  Looks very anxious.  Explained to him that [facility] is a detox based program and that since he is detoxing here, his options at d/c for [facility] will either be PHP or IOP.  Pt is agreeable to these options.  On the day of discharge, he will need to perform a telephone screen with the intake department at [facility] and they will determine his level of care for AUD.  Will continue to follow.  Thank you very much for this consult.               Discharge Codes    No documentation.               Expected Discharge Date and Time     Expected Discharge Date Expected Discharge Time    Mar 3, 2023             Emy Covarrubias RN  MA,BSN-  Addiction Coordinator

## 2023-03-02 NOTE — PLAN OF CARE
Goal Outcome Evaluation:         Patient rested well overnight. RA but required 2L with sleep. Ciwas were 2-4 most of the night. ABX and steroids given. SR/SB on the monitor. No call outs of pain.

## 2023-03-02 NOTE — PROGRESS NOTES
[facility] Medicine Services  PROGRESS NOTE    Patient Name: Leonard Crandall  : 1970  MRN: [id number]    Date of Admission: 2023  Primary Care Physician: Jhon Khoury PA    Subjective   Subjective     CC:  Copd/bronchitis/etoh w/d/afib    HPI:  Pt is seen resting up in bed in NAD. Awake and alert.  States he doesn't feel very good today.  Still shaky and weak.  Flushed.  Wants to go back to the [facility] on dc but doesn't feel ready today.  CIWA 7 currently.  Worried about his personal belongings at the [facility].  I spoke with pts nurse who will speak with  re dc planning.     ROS:  Gen- No fevers, chills  CV- No chest pain, palpitations  Resp- No cough, minimal soa with exertion intermittently   GI- No N/V/D, abd pain    Objective   Objective     Vital Signs:   Temp:  [97.6 °F (36.4 °C)-98.5 °F (36.9 °C)] 98.1 °F (36.7 °C)  Heart Rate:  [53-95] 73  Resp:  [16-18] 18  BP: (108-142)/() 142/86  Flow (L/min):  [2] 2     Physical Exam:  Constitutional: No acute distress, awake, alert  HENT: NCAT, mucous membranes moist  Respiratory:  Coarse lung sounds, rare exp wheeze.   respiratory effort normal on 2LNC.  Speaking in full sentences.   Cardiovascular: RRR, no murmurs, rubs, or gallops  Gastrointestinal: Positive bowel sounds, soft, nontender, nondistended  Musculoskeletal: No bilateral ankle edema  Psychiatric: Appropriate affect, cooperative  Neurologic: Oriented x 3, strength symmetric in all extremities, Cranial Nerves grossly intact to confrontation, speech clear  Skin: No rashes. Mild reproducible midsternal ttp.  No crepitus.  No wound or warmth, erythema or edema.        Results Reviewed:  LAB RESULTS:      Lab 23  0629 23  0738 23  0800 23  1506   WBC 11.61* 9.74 9.78 8.01   HEMOGLOBIN 16.2 14.8 16.7 16.5   HEMATOCRIT 45.4 42.3 47.1 46.9   PLATELETS 211 201 254 235   NEUTROS ABS  --  6.80 7.12*  --    IMMATURE GRANS (ABS)  --  0.03 0.02  --     LYMPHS ABS  --  1.96 1.75  --    MONOS ABS  --  0.62 0.74  --    EOS ABS  --  0.30 0.09  --    MCV 88.2 89.8 89.0 89   LACTATE, ARTERIAL  --   --   --  2.5*         Lab 03/02/23  0629 03/01/23  0738 02/28/23  0800   SODIUM 135* 136 141   POTASSIUM 4.6 4.0 4.1   CHLORIDE 100 99 100   CO2 24.0 26.0 23.0   ANION GAP 11.0 11.0 18.0*   BUN 11 11 7   CREATININE 0.77 0.71* 0.82   EGFR 109.1 111.8 107.0   GLUCOSE 120* 82 118*   CALCIUM 8.5* 8.0* 8.7   MAGNESIUM 2.3 1.7 2.1   TSH  --   --  0.719         Lab 03/01/23  0738 02/28/23  0800 02/27/23  1506   TOTAL PROTEIN 5.3* 6.6  --    ALBUMIN 3.7 4.4  --    GLOBULIN 1.6 2.2  --    ALT (SGPT) 26 41  --    AST (SGOT) 31 50*  --    BILIRUBIN 1.5* 1.3*  --    ALK PHOS 79 88  --    LIPASE  --  36 79*         Lab 02/28/23  1231 02/28/23  0821 02/28/23  0800 02/27/23  1711 02/27/23  1506   PROBNP  --   --  13.9  --   --    TROPONIN T  --   --   --  7  --    TROPONIN T (HIGHLY SENSITIVE)  --   --   --   --  8   HSTROP T 6 <6  --   --   --                  Brief Urine Lab Results     None          Microbiology Results Abnormal     Procedure Component Value - Date/Time    COVID-19 and FLU A/B PCR - Swab, Nasopharynx [[id number]]  (Normal) Collected: 02/28/23 0803    Lab Status: Final result Specimen: Swab from Nasopharynx Updated: 02/28/23 0849     COVID19 Not Detected     Influenza A PCR Not Detected     Influenza B PCR Not Detected    Narrative:      Fact sheet for providers: https://www.fda.gov/media/519333/download    Fact sheet for patients: https://www.fda.gov/media/781403/download    Test performed by PCR.          Adult Transthoracic Echo Complete W/ Cont if Necessary Per Protocol    Result Date: 3/2/2023  •  Left ventricular systolic function is normal. Calculated left ventricular EF = 56.8% Normal left ventricular cavity size and wall thickness noted. All left ventricular wall segments contract normally. Left ventricular diastolic function was normal. Normal left atrial  pressure. No evidence of a ventricular septal defect present. •  Normal right ventricular cavity size, wall thickness, systolic function •  Normal left atrial size •  Normal right atrial cavity size •  The mitral valve is grossly normal in structure. No significant mitral valve regurgitation is present. No significant mitral valve stenosis is present. •  The tricuspid valve is grossly normal in structure. Physiologic tricuspid valve regurgitation is present.       Results for orders placed during the hospital encounter of 02/28/23    Adult Transthoracic Echo Complete W/ Cont if Necessary Per Protocol    Interpretation Summary  •  Left ventricular systolic function is normal. Calculated left ventricular EF = 56.8% Normal left ventricular cavity size and wall thickness noted. All left ventricular wall segments contract normally. Left ventricular diastolic function was normal. Normal left atrial pressure. No evidence of a ventricular septal defect present.  •  Normal right ventricular cavity size, wall thickness, systolic function  •  Normal left atrial size  •  Normal right atrial cavity size  •  The mitral valve is grossly normal in structure. No significant mitral valve regurgitation is present. No significant mitral valve stenosis is present.  •  The tricuspid valve is grossly normal in structure. Physiologic tricuspid valve regurgitation is present.      Current medications:  Scheduled Meds:aspirin, 81 mg, Oral, Daily  cefTRIAXone, 2 g, Intravenous, Q24H  diazePAM, 10 mg, Intravenous, Q8H  doxycycline, 100 mg, Intravenous, Q12H  enoxaparin, 40 mg, Subcutaneous, Q24H  folic acid, 1 mg, Oral, Daily  ipratropium-albuterol, 3 mL, Nebulization, 4x Daily - RT  lisinopril, 10 mg, Oral, Daily  methylPREDNISolone sodium succinate, 20 mg, Intravenous, Q12H  metoprolol tartrate, 50 mg, Oral, Q12H  multivitamin, 1 tablet, Oral, Daily  nicotine, 1 patch, Transdermal, Q24H  sodium chloride, 10 mL, Intravenous, Q12H  thiamine,  100 mg, Oral, Daily      Continuous Infusions:esmolol,  mcg/kg/min, Last Rate: Stopped (02/28/23 1702)      PRN Meds:.•  acetaminophen **OR** acetaminophen **OR** acetaminophen  •  guaiFENesin-dextromethorphan  •  LORazepam **OR** LORazepam **OR** LORazepam **OR** LORazepam **OR** LORazepam **OR** LORazepam **OR** LORazepam **OR** LORazepam  •  Magnesium Standard Dose Replacement - Initiate Nurse / BPA Driven Protocol  •  ondansetron **OR** ondansetron  •  sodium chloride  •  sodium chloride  •  sodium chloride    Assessment & Plan   Assessment & Plan     Active Hospital Problems    Diagnosis  POA   • **Atrial flutter with rapid ventricular response (HCC) [I48.92]  Yes   • A-fib (HCC) [I48.91]  Yes   • Tobacco abuse [Z72.0]  Yes   • COPD (chronic obstructive pulmonary disease) (HCC) [J44.9]  Yes   • Alcoholism (HCC) [F10.20]  Yes      Resolved Hospital Problems   No resolved problems to display.        Brief Hospital Course to date:  Leonard Crandall is a 52 y.o. male smoker w/ copd and etoh abuse presented from [facility] for medical clearance due to cough, chest pain. Was noted afib rvr. Cards consulted. Converted to sinus rhythm. Echo was normal, tsh wnl. Initiated on scheduled valium and ciwa checks w/ prn ativan. Initiated on rocephin/doxy on 3/1/23 along w/ solumedrol and duonebs due to wheezing and productive cough.     This patient's problems and plans were partially entered by my partner and updated as appropriate by me 03/02/23.    Assessment/Plan:  Pt is new to me today     ETOH abuse & withdrawal  -sent from [facility] for medical clearance due to chest pain  -admits to drinking 2 fifths per day; has hx previous delirium tremens  -continue valium 10mg tid, ciwa checks w/ prn ativan  -mv/thiamine/folic acid  -addiction medicine consult pending    Transient Afib w/ RVR, converted to sinus rhythm  Atypical chest pain (felt due to pleurisy/coughing/copd)  - in setting of heavy alcohol use and  withdrawal  -echo normal LV EF, no significant mitral dz  -cards following: [id number] 1 for htn: continue asa, metoprolol; no further cardiac testing at this time, follow up cards 4-8 weeks    Acute hypoxic resp failure (due to below)  COPD w/ acute exacerbation/bronchospasm  Bronchitis  Chest wall pain (pleuritic and reproducable w/ deep palpation) likely muscular strain--stable  Tobacco abuse  -covid 19/flu pcr negative  -yellow sputum  -start rocephin & doxy day #2/5 (could change to ceftin/po doxy at dc); solumedrol 20mg iv bid,   --will transition to po pred today; duonebs qid; deescalate steroids and nebs quickly once wheezing improved  -requiring 2Lnc currently.  Had weaned down to 1L but back up to 2L this am.        Expected Discharge Location and Transportation: ? Home (vs inpatient substance rehab--pt wants to go back to [facility]). CM following   Expected Discharge ? 3/4/23 (depending on clinical course, addiction recs)  Expected Discharge Date and Time     Expected Discharge Date Expected Discharge Time    Mar 3, 2023            DVT prophylaxis:  Medical DVT prophylaxis orders are present. sq lovenox    AM-PAC 6 Clicks Score (PT): 24 (03/02/23 0800)    CODE STATUS:   Code Status and Medical Interventions:   Ordered at: 02/28/23 1437     Code Status (Patient has no pulse and is not breathing):    CPR (Attempt to Resuscitate)     Medical Interventions (Patient has pulse or is breathing):    Full Support       Chio Zhang, APRN  03/02/23

## 2023-03-02 NOTE — DISCHARGE SUMMARY
[facility] Medicine Services  ELOPEMENT AGAINST MEDICAL ADVICE    Patient Name: Leonard Crandall  : 1970  MRN: [id number]    Date of Admission: 2023  Date of Elopement:  3/2/2023  Primary Care Physician: Jhon Khoury PA    Consults     No orders found from 2023 to 3/1/2023.        Hospital Course     Presenting Problem:   Atrial flutter with rapid ventricular response (HCC) [I48.92]  A-fib (HCC) [I48.91]    Active Hospital Problems    Diagnosis  POA   • **Atrial flutter with rapid ventricular response (HCC) [I48.92]  Yes   • A-fib (HCC) [I48.91]  Yes   • Tobacco abuse [Z72.0]  Yes   • COPD (chronic obstructive pulmonary disease) (HCC) [J44.9]  Yes   • Alcoholism (HCC) [F10.20]  Yes      Resolved Hospital Problems   No resolved problems to display.          Hospital Course:  (see progress note from 1230 pm today--copy below)  Leonard Crandall is a 52 y.o. male smoker w/ copd and etoh abuse presented from [facility] for medical clearance due to cough, chest pain. Was noted afib rvr. Cards consulted. Converted to sinus rhythm. Echo was normal, tsh wnl. Initiated on scheduled valium and ciwa checks w/ prn ativan. Initiated on rocephin/doxy on 3/1/23 along w/ solumedrol and duonebs due to wheezing and productive cough.      ETOH abuse & withdrawal  -sent from [facility] for medical clearance due to chest pain  -admits to drinking 2 fifths per day; has hx previous delirium tremens  -continue valium 10mg tid, ciwa checks w/ prn ativan  -mv/thiamine/folic acid  -addiction medicine consult pending     Transient Afib w/ RVR, converted to sinus rhythm  Atypical chest pain (felt due to pleurisy/coughing/copd)  - in setting of heavy alcohol use and withdrawal  -echo normal LV EF, no significant mitral dz  -cards following: [id number] 1 for htn: continue asa, metoprolol; no further cardiac testing at this time, follow up cards 4-8 weeks     Acute hypoxic resp failure (due to  below)  COPD w/ acute exacerbation/bronchospasm  Bronchitis  Chest wall pain (pleuritic and reproducable w/ deep palpation) likely muscular strain--stable  Tobacco abuse  -covid 19/flu pcr negative  -yellow sputum  -start rocephin & doxy day #2/5 (could change to ceftin/po doxy at dc); solumedrol 20mg iv bid,   --will transition to po pred today; duonebs qid; deescalate steroids and nebs quickly once wheezing improved  -requiring 2Lnc currently.  Had weaned down to 1L but back up to 2L this am.      Was initially seen at 12:30 PM resting in bed.  Was agreeable to continue current treatment plan and to attempt to return to the [facility] at discharge.  Management was working on disposition.  My exam patient was calm and cooperative.  He reported to me his last drink of alcohol was 3 days ago.  Received Message from patient's nurse stating he had just taken out his IV and removed his EKG leads and was walking out AMA.  I attempted to have nurse persuade patient to remain so that I can discuss case with them at discharge with medications as applicable but he refused and left immediately.  Was not seen again since 12:30 PM by myself.         **Patient left AMA prior to completion of evaluation and management**      Day of Discharge     HPI:   **Patient left AMA prior to completion of evaluation and management**    Vital Signs:   Temp:  [97.6 °F (36.4 °C)-98.5 °F (36.9 °C)] 98.1 °F (36.7 °C)  Heart Rate:  [53-95] 73  Resp:  [16-18] 18  BP: (113-142)/() 142/86     Physical Exam (if applicable):  See physical exam from progress note at 1230 pm today     Discharge Details     Discharge Disposition:  **Patient left AMA prior to completion of evaluation and management, therefore discharge planning remains incomplete including absence of any needed discharging medications, testing arrangements or follow up unless otherwise specified**    Future Appointments   Date Time Provider Department Center   7/5/2023  8:45 AM [place]  [facility] Zhang, APRN  03/02/23

## 2023-03-02 NOTE — PROGRESS NOTES
[facility] Cardiology  Inpatient Progress Note    Chief Complaint/Reason for consult:    Chief Complaint   Patient presents with   • Chest Pain          Subjective  No palpitations overnight, breathing feeling better with breathing treatments       Vital Sign Min/Max for last 24 hours  Temp  Min: 97.6 °F (36.4 °C)  Max: 98.5 °F (36.9 °C)   BP  Min: 105/66  Max: 133/84   Pulse  Min: 53  Max: 83   Resp  Min: 16  Max: 18   SpO2  Min: 90 %  Max: 96 %   Flow (L/min)  Min: 2  Max: 3      Intake/Output Summary (Last 24 hours) at 3/2/2023 0930  Last data filed at 3/1/2023 1700  Gross per 24 hour   Intake 270 ml   Output --   Net 270 ml           Gen: well developed, lying in bed, comfortable appearing  HEENT: MMM, sclerae anicteric, conjunctivae normal  CV: regular rate, regular rhythm, no murmurs or rubs, normal S1, S2. 2+ radial and DP pulses  Pulm: RA, normal work of breathing, no wheezes, rales, rhonchi  Ext: normal bulk for age, normal tone, no dependent edema  Neuro: alert, oriented, face symmetrical, moving all extremities well  Psych:  flat affect    Tele:  SR, SB, ST overnight, no recurrent afib/flutter seen    Results Review (reviewed the patient's recent labs in the electronic medical record):     Radiology:    No radiology results for the last day    Lab Results   Component Value Date    WBC 11.61 (H) 03/02/2023    HGB 16.2 03/02/2023    HCT 45.4 03/02/2023    MCV 88.2 03/02/2023     03/02/2023     Lab Results   Component Value Date    GLUCOSE 120 (H) 03/02/2023    CALCIUM 8.5 (L) 03/02/2023     (L) 03/02/2023    K 4.6 03/02/2023    CO2 24.0 03/02/2023     03/02/2023    BUN 11 03/02/2023    CREATININE 0.77 03/02/2023    EGFRIFAFRI >60 07/22/2022    EGFRIFNONA >60 07/22/2022    BCR 14.3 03/02/2023    ANIONGAP 11.0 03/02/2023     Lab Results   Component Value Date    TSH 0.719 02/28/2023      Assessment     PAF in the setting of heavy alcohol use and withdrawal   - TTE with  normal LVEF, no significant mitral disease   - continue BB   - [id number] 1 for hypertension, will continue with aspirin alone   - telemetry while admitted    - no additional cardiac testing at this time, can follow-up in cardiology clinic in 4-8 weeks post discharge   - Cardiology will see as needed, please call with questions      LESVIA Freed MD  3/2/2023  09:30 EST

## 2023-03-02 NOTE — CASE MANAGEMENT/SOCIAL WORK
Case Management Discharge Note      Final Note: Patient discharged AMA.         Selected Continued Care - Discharged on 3/2/2023 Admission date: 2/28/2023 - Discharge disposition: Left Against Medical Advice    Destination    No services have been selected for the patient.              Durable Medical Equipment    No services have been selected for the patient.              Dialysis/Infusion    No services have been selected for the patient.              Home Medical Care    No services have been selected for the patient.              Therapy    No services have been selected for the patient.              Community Resources    No services have been selected for the patient.              Community & DME    No services have been selected for the patient.                       Final Discharge Disposition Code: 07 - left AMA

## 2023-03-03 ENCOUNTER — TRANSITIONAL CARE MANAGEMENT TELEPHONE ENCOUNTER (OUTPATIENT)
Dept: CALL CENTER | Facility: HOSPITAL | Age: 53
End: 2023-03-03
Payer: MEDICAID

## 2023-03-03 NOTE — OUTREACH NOTE
Call Center TCM Note    Flowsheet Row Responses   [facility] patient discharged from? [place]   Does the patient have one of the following disease processes/diagnoses(primary or secondary)? Other   TCM attempt successful? No   Unsuccessful attempts Attempt 1          Simona Red RN    3/3/2023, 11:34 EST

## 2023-03-03 NOTE — OUTREACH NOTE
Prep Survey    Flowsheet Row Responses   [facility] facility patient discharged from? [place]   Is LACE score < 7 ? No   Eligibility [facility]   Date of Admission 02/28/23   Date of Discharge 03/02/23   Discharge Disposition Left Against Medical Advice   Discharge diagnosis Atrial flutter with rapid ventricular response Alcoholism sent from [facility] for medical clearance due to chest pain   Does the patient have one of the following disease processes/diagnoses(primary or secondary)? Other   Does the patient have Home health ordered? No   Is there a DME ordered? No   Prep survey completed? Yes          JAS ESCOBEDO - Registered Nurse

## 2023-03-03 NOTE — OUTREACH NOTE
Call Center TCM Note    Flowsheet Row Responses   [facility] patient discharged from? [place]   Does the patient have one of the following disease processes/diagnoses(primary or secondary)? Other   TCM attempt successful? No   Unsuccessful attempts Attempt 2          iSmona Red RN    3/3/2023, 14:36 EST

## 2023-03-06 ENCOUNTER — TRANSITIONAL CARE MANAGEMENT TELEPHONE ENCOUNTER (OUTPATIENT)
Dept: CALL CENTER | Facility: HOSPITAL | Age: 53
End: 2023-03-06
Payer: MEDICAID

## 2023-03-06 NOTE — OUTREACH NOTE
Call Center TCM Note    Flowsheet Row Responses   [facility] patient discharged from? [place]   Does the patient have one of the following disease processes/diagnoses(primary or secondary)? Other   TCM attempt successful? No   Unsuccessful attempts Attempt 3          Adriane Suarez RN    3/6/2023, 09:27 EST

## 2023-03-30 ENCOUNTER — APPOINTMENT (OUTPATIENT)
Dept: GENERAL RADIOLOGY | Facility: HOSPITAL | Age: 53
End: 2023-03-30
Payer: MEDICAID

## 2023-03-30 ENCOUNTER — APPOINTMENT (OUTPATIENT)
Dept: CT IMAGING | Facility: HOSPITAL | Age: 53
End: 2023-03-30
Payer: MEDICAID

## 2023-03-30 ENCOUNTER — HOSPITAL ENCOUNTER (EMERGENCY)
Facility: HOSPITAL | Age: 53
Discharge: HOME | End: 2023-03-30
Attending: EMERGENCY MEDICINE | Admitting: EMERGENCY MEDICINE
Payer: MEDICAID

## 2023-03-30 VITALS
RESPIRATION RATE: 16 BRPM | TEMPERATURE: 98 F | BODY MASS INDEX: 29.52 KG/M2 | HEIGHT: 74 IN | SYSTOLIC BLOOD PRESSURE: 135 MMHG | DIASTOLIC BLOOD PRESSURE: 83 MMHG | WEIGHT: 230 LBS | OXYGEN SATURATION: 96 % | HEART RATE: 90 BPM

## 2023-03-30 DIAGNOSIS — R10.84 GENERALIZED ABDOMINAL PAIN: ICD-10-CM

## 2023-03-30 DIAGNOSIS — F10.21 HISTORY OF ALCOHOLISM: ICD-10-CM

## 2023-03-30 DIAGNOSIS — R07.9 CHEST PAIN, UNSPECIFIED TYPE: Primary | ICD-10-CM

## 2023-03-30 LAB
ALBUMIN SERPL-MCNC: 4.1 G/DL (ref 3.5–5.2)
ALBUMIN/GLOB SERPL: 1.8 G/DL
ALP SERPL-CCNC: 98 U/L (ref 39–117)
ALT SERPL W P-5'-P-CCNC: 41 U/L (ref 1–41)
ANION GAP SERPL CALCULATED.3IONS-SCNC: 20 MMOL/L (ref 5–15)
AST SERPL-CCNC: 52 U/L (ref 1–40)
BASOPHILS # BLD AUTO: 0.06 10*3/MM3 (ref 0–0.2)
BASOPHILS NFR BLD AUTO: 0.7 % (ref 0–1.5)
BILIRUB SERPL-MCNC: 0.8 MG/DL (ref 0–1.2)
BUN SERPL-MCNC: 8 MG/DL (ref 6–20)
BUN/CREAT SERPL: 10.4 (ref 7–25)
CALCIUM SPEC-SCNC: 8.5 MG/DL (ref 8.6–10.5)
CHLORIDE SERPL-SCNC: 99 MMOL/L (ref 98–107)
CO2 SERPL-SCNC: 22 MMOL/L (ref 22–29)
CREAT BLDA-MCNC: 1 MG/DL
CREAT SERPL-MCNC: 0.77 MG/DL (ref 0.76–1.27)
DEPRECATED RDW RBC AUTO: 49.7 FL (ref 37–54)
EGFRCR SERPLBLD CKD-EPI 2021: 109.1 ML/MIN/1.73
EOSINOPHIL # BLD AUTO: 0.01 10*3/MM3 (ref 0–0.4)
EOSINOPHIL NFR BLD AUTO: 0.1 % (ref 0.3–6.2)
ERYTHROCYTE [DISTWIDTH] IN BLOOD BY AUTOMATED COUNT: 14.7 % (ref 12.3–15.4)
ETHANOL BLD-MCNC: 84 MG/DL (ref 0–10)
GLOBULIN UR ELPH-MCNC: 2.3 GM/DL
GLUCOSE SERPL-MCNC: 117 MG/DL (ref 65–99)
HCT VFR BLD AUTO: 49.4 % (ref 37.5–51)
HGB BLD-MCNC: 17.1 G/DL (ref 13–17.7)
HOLD SPECIMEN: NORMAL
IMM GRANULOCYTES # BLD AUTO: 0.03 10*3/MM3 (ref 0–0.05)
IMM GRANULOCYTES NFR BLD AUTO: 0.4 % (ref 0–0.5)
LIPASE SERPL-CCNC: 31 U/L (ref 13–60)
LYMPHOCYTES # BLD AUTO: 0.89 10*3/MM3 (ref 0.7–3.1)
LYMPHOCYTES NFR BLD AUTO: 11 % (ref 19.6–45.3)
MCH RBC QN AUTO: 31.8 PG (ref 26.6–33)
MCHC RBC AUTO-ENTMCNC: 34.6 G/DL (ref 31.5–35.7)
MCV RBC AUTO: 92 FL (ref 79–97)
MONOCYTES # BLD AUTO: 0.44 10*3/MM3 (ref 0.1–0.9)
MONOCYTES NFR BLD AUTO: 5.4 % (ref 5–12)
NEUTROPHILS NFR BLD AUTO: 6.68 10*3/MM3 (ref 1.7–7)
NEUTROPHILS NFR BLD AUTO: 82.4 % (ref 42.7–76)
NRBC BLD AUTO-RTO: 0 /100 WBC (ref 0–0.2)
NT-PROBNP SERPL-MCNC: 67.2 PG/ML (ref 0–900)
PLATELET # BLD AUTO: 263 10*3/MM3 (ref 140–450)
PMV BLD AUTO: 9 FL (ref 6–12)
POTASSIUM SERPL-SCNC: 3.6 MMOL/L (ref 3.5–5.2)
PROT SERPL-MCNC: 6.4 G/DL (ref 6–8.5)
QT INTERVAL: 382 MS
QT INTERVAL: 384 MS
QTC INTERVAL: 459 MS
QTC INTERVAL: 472 MS
RBC # BLD AUTO: 5.37 10*6/MM3 (ref 4.14–5.8)
SODIUM SERPL-SCNC: 141 MMOL/L (ref 136–145)
TROPONIN T SERPL HS-MCNC: 9 NG/L
WBC NRBC COR # BLD: 8.11 10*3/MM3 (ref 3.4–10.8)
WHOLE BLOOD HOLD COAG: NORMAL
WHOLE BLOOD HOLD SPECIMEN: NORMAL

## 2023-03-30 PROCEDURE — 82077 ASSAY SPEC XCP UR&BREATH IA: CPT | Performed by: EMERGENCY MEDICINE

## 2023-03-30 PROCEDURE — 82565 ASSAY OF CREATININE: CPT

## 2023-03-30 PROCEDURE — 25510000001 IOPAMIDOL PER 1 ML: Performed by: EMERGENCY MEDICINE

## 2023-03-30 PROCEDURE — 83880 ASSAY OF NATRIURETIC PEPTIDE: CPT | Performed by: EMERGENCY MEDICINE

## 2023-03-30 PROCEDURE — 80053 COMPREHEN METABOLIC PANEL: CPT | Performed by: EMERGENCY MEDICINE

## 2023-03-30 PROCEDURE — 99284 EMERGENCY DEPT VISIT MOD MDM: CPT

## 2023-03-30 PROCEDURE — 25010000002 ONDANSETRON PER 1 MG: Performed by: EMERGENCY MEDICINE

## 2023-03-30 PROCEDURE — 71045 X-RAY EXAM CHEST 1 VIEW: CPT

## 2023-03-30 PROCEDURE — 71275 CT ANGIOGRAPHY CHEST: CPT

## 2023-03-30 PROCEDURE — 74177 CT ABD & PELVIS W/CONTRAST: CPT

## 2023-03-30 PROCEDURE — 93005 ELECTROCARDIOGRAM TRACING: CPT | Performed by: EMERGENCY MEDICINE

## 2023-03-30 PROCEDURE — 84484 ASSAY OF TROPONIN QUANT: CPT | Performed by: EMERGENCY MEDICINE

## 2023-03-30 PROCEDURE — 96374 THER/PROPH/DIAG INJ IV PUSH: CPT

## 2023-03-30 PROCEDURE — 85025 COMPLETE CBC W/AUTO DIFF WBC: CPT | Performed by: EMERGENCY MEDICINE

## 2023-03-30 PROCEDURE — 83690 ASSAY OF LIPASE: CPT | Performed by: EMERGENCY MEDICINE

## 2023-03-30 PROCEDURE — 93005 ELECTROCARDIOGRAM TRACING: CPT

## 2023-03-30 RX ORDER — SODIUM CHLORIDE 0.9 % (FLUSH) 0.9 %
10 SYRINGE (ML) INJECTION AS NEEDED
Status: DISCONTINUED | OUTPATIENT
Start: 2023-03-30 | End: 2023-03-30 | Stop reason: HOSPADM

## 2023-03-30 RX ORDER — ONDANSETRON 4 MG/1
4 TABLET, FILM COATED ORAL EVERY 8 HOURS PRN
Qty: 15 TABLET | Refills: 0 | Status: SHIPPED | OUTPATIENT
Start: 2023-03-30

## 2023-03-30 RX ORDER — ONDANSETRON 2 MG/ML
4 INJECTION INTRAMUSCULAR; INTRAVENOUS ONCE
Status: COMPLETED | OUTPATIENT
Start: 2023-03-30 | End: 2023-03-30

## 2023-03-30 RX ADMIN — ONDANSETRON 4 MG: 2 INJECTION INTRAMUSCULAR; INTRAVENOUS at 14:07

## 2023-03-30 RX ADMIN — SODIUM CHLORIDE 500 ML: 9 INJECTION, SOLUTION INTRAVENOUS at 14:06

## 2023-03-30 RX ADMIN — IOPAMIDOL 99 ML: 755 INJECTION, SOLUTION INTRAVENOUS at 14:27

## 2023-03-30 NOTE — CASE MANAGEMENT/SOCIAL WORK
"Continued Stay Note  [facility]     Patient Name: Leonard Crandall  MRN: [id number]  Today's Date: 3/30/2023    Admit Date: 3/30/2023    Plan: Home with AUD treatment resources   Discharge Plan     Row Name 03/30/23 1605       Plan    Plan     Plan Comments This patient is known to our service line from a previous admission- he states today that he is \"detoxing\" and \"can't walk.\"      The patient is alert and oriented at this time, he said that he just left [facility] and left there because they \"left me in a chair for 9 hours.\"     During his last admission here, he was supposed to go to [facility] for treatment, but left AMA instead.   We have encouraged him to reach out to [facility] for AUD treatment-we called [facility] while we were at his bedside and he did talk to an .      We will await a follow-up call back from [facility].            Row Name 03/30/23 3445       Plan    Plan SW follow up--outpatient resources    Plan Comments [facility] provided RN with resources for outpatient mental health and substance abuse. MSW is available.    Final Discharge Disposition Code 01 - home or self-care               Discharge Codes    No documentation.                     Emy Covarrubias RN  MA,BSN-  Addiction Coordinator     "

## 2023-03-30 NOTE — ED PROVIDER NOTES
"Subjective   History of Present Illness  52-year-old male presents for evaluation of multiple complaints.  Of note, the patient is an admitted alcoholic.  He states that since yesterday he has been experiencing chest pain, upper abdominal pain, nausea/vomiting, and paresthesias to both his hands and feet.  Given his many symptoms, he called EMS and was brought to our facility to be evaluated.  His last drink of alcohol was about an hour ago.  He currently rates his pain at \"10 out of 10\" in severity throughout both his chest and abdomen.  He denies any diarrhea.  He denies any known sick contacts.  He denies any accompanying diaphoresis.  He denies any known dietary triggers for his symptoms.  He has cardiac risk factors of hypertension, smoking, and positive family history.  He is not suicidal or homicidal.        Review of Systems   Cardiovascular: Positive for chest pain.   Gastrointestinal: Positive for abdominal pain, nausea and vomiting.   Neurological: Positive for numbness.   All other systems reviewed and are negative.      Past Medical History:   Diagnosis Date   • Alcoholism ([facility]) 01/20/2022   • COPD (chronic obstructive pulmonary disease) ([facility]) 01/20/2022   • Hypertension    • Tobacco abuse 01/20/2022       No Known Allergies    No past surgical history on file.    Family History   Problem Relation Age of Onset   • Diabetes Mother    • Acne Mother    • Heart attack Father        Social History     Socioeconomic History   • Marital status: Single   Tobacco Use   • Smoking status: Every Day     Packs/day: 1.00     Years: 40.00     Pack years: 40.00     Types: Cigarettes     Start date: 1985   • Smokeless tobacco: Former   Vaping Use   • Vaping Use: Never used   Substance and Sexual Activity   • Alcohol use: Yes     Comment: 1 PINT LIQUOR/DAY.   • Drug use: No   • Sexual activity: Defer           Objective   Physical Exam  Vitals and nursing note reviewed.   Constitutional:       General: He is not in acute " distress.     Appearance: He is well-developed. He is not diaphoretic.      Comments: Nontoxic-appearing male, disheveled   HENT:      Head: Normocephalic and atraumatic.   Neck:      Vascular: No JVD.   Cardiovascular:      Rate and Rhythm: Normal rate and regular rhythm.      Heart sounds: Normal heart sounds. No murmur heard.    No friction rub. No gallop.   Pulmonary:      Effort: Pulmonary effort is normal. No respiratory distress.      Breath sounds: Normal breath sounds. No wheezing or rales.   Abdominal:      General: Bowel sounds are normal. There is no distension.      Palpations: Abdomen is soft. There is no mass.      Tenderness: There is no abdominal tenderness. There is no guarding.      Comments: No focal abdominal tenderness, no peritoneal signs, no pain out of proportion to exam   Musculoskeletal:         General: Normal range of motion.      Right lower leg: No edema.      Left lower leg: No edema.   Skin:     General: Skin is warm and dry.      Coloration: Skin is not pale.      Findings: No erythema or rash.   Neurological:      Mental Status: He is alert and oriented to person, place, and time.      Comments: Awake, alert, and oriented x3, clear and fluent speech, no ataxia or dysmetria, normal gait, neurovascularly intact distally in all fours with bounding distal pulses and normal sensation, 5 out of 5 strength in all fours, no cranial nerve deficits noted with cranial nerves II through XII grossly intact, atremulous   Psychiatric:         Mood and Affect: Mood normal.         Thought Content: Thought content normal.         Judgment: Judgment normal.         Procedures           ED Course  ED Course as of 03/30/23 1848   Thu Mar 30, 2023   1617 52-year-old male presents for evaluation of multiple complaints.  Of note, the patient is an admitted alcoholic.  He states that since yesterday he has been experiencing chest pain, upper abdominal pain, nausea/vomiting, and paresthesias to his hands  and feet.  Given his many symptoms, he called EMS and was brought to our facility to be evaluated.  On arrival, the patient is nontoxic-appearing.  Nonsurgical abdomen.  He has cardiac risk factors of smoking, positive family history, and hypertension.  Labs are unrevealing. [DD]   1618 EKG revealed normal sinus rhythm with a heart rate of 87 and no ST segments suggestive of or concerning for ischemia. [DD]   1618 Advanced imaging obtained negative. [DD]   1618 HEART score of 3.  [DD]   1618 The patient has been experiencing ongoing symptoms now for more than 24 hours.  High-sensitivity troponin is negative, effectively ruling out ACS. [DD]   1619 Doubt ACS, PE, dissection, or emergent cardiothoracic process at this time based on exam, history, clinical presentation, gestalt, objective findings in the ED, and risk stratification.  HEART score of 3.  The patient will follow up with the chest pain clinic within the next 72 hours for further outpatient work-up and evaluation.  Agreeable with plan and given appropriate strict return precautions.     [DD]   1619 The patient is not tremulous and does not exhibiting any alcohol withdrawal symptoms at this time.   [DD]   1658 He was evaluated by our addiction team who was able to coordinate for the patient to go to [facility] directly from the emergency department for voluntary detox.  We will get the patient a lift and he will go directly there. [DD]   1659 Prescription for Zofran as needed.  No indication for admission to our facility.  Agreeable with plan and given appropriate strict return precautions. [DD]      ED Course User Index  [DD] Esteban Agustin MD                                       Recent Results (from the past 24 hour(s))   ECG 12 Lead ED Triage Standing Order; Chest Pain    Collection Time: 03/30/23 12:18 PM   Result Value Ref Range    QT Interval 382 ms    QTC Interval 459 ms   High Sensitivity Troponin T    Collection Time: 03/30/23  2:00 PM     Specimen: Blood   Result Value Ref Range    HS Troponin T 9 <15 ng/L   Comprehensive Metabolic Panel    Collection Time: 03/30/23  2:00 PM    Specimen: Blood   Result Value Ref Range    Glucose 117 (H) 65 - 99 mg/dL    BUN 8 6 - 20 mg/dL    Creatinine 0.77 0.76 - 1.27 mg/dL    Sodium 141 136 - 145 mmol/L    Potassium 3.6 3.5 - 5.2 mmol/L    Chloride 99 98 - 107 mmol/L    CO2 22.0 22.0 - 29.0 mmol/L    Calcium 8.5 (L) 8.6 - 10.5 mg/dL    Total Protein 6.4 6.0 - 8.5 g/dL    Albumin 4.1 3.5 - 5.2 g/dL    ALT (SGPT) 41 1 - 41 U/L    AST (SGOT) 52 (H) 1 - 40 U/L    Alkaline Phosphatase 98 39 - 117 U/L    Total Bilirubin 0.8 0.0 - 1.2 mg/dL    Globulin 2.3 gm/dL    A/G Ratio 1.8 g/dL    BUN/Creatinine Ratio 10.4 7.0 - 25.0    Anion Gap 20.0 (H) 5.0 - 15.0 mmol/L    eGFR 109.1 >60.0 mL/min/1.73   Lipase    Collection Time: 03/30/23  2:00 PM    Specimen: Blood   Result Value Ref Range    Lipase 31 13 - 60 U/L   BNP    Collection Time: 03/30/23  2:00 PM    Specimen: Blood   Result Value Ref Range    proBNP 67.2 0.0 - 900.0 pg/mL   Green Top (Gel)    Collection Time: 03/30/23  2:00 PM   Result Value Ref Range    Extra Tube Hold for add-ons.    Lavender Top    Collection Time: 03/30/23  2:00 PM   Result Value Ref Range    Extra Tube hold for add-on    Gold Top - SST    Collection Time: 03/30/23  2:00 PM   Result Value Ref Range    Extra Tube Hold for add-ons.    Gray Top    Collection Time: 03/30/23  2:00 PM   Result Value Ref Range    Extra Tube Hold for add-ons.    Light Blue Top    Collection Time: 03/30/23  2:00 PM   Result Value Ref Range    Extra Tube Hold for add-ons.    CBC Auto Differential    Collection Time: 03/30/23  2:00 PM    Specimen: Blood   Result Value Ref Range    WBC 8.11 3.40 - 10.80 10*3/mm3    RBC 5.37 4.14 - 5.80 10*6/mm3    Hemoglobin 17.1 13.0 - 17.7 g/dL    Hematocrit 49.4 37.5 - 51.0 %    MCV 92.0 79.0 - 97.0 fL    MCH 31.8 26.6 - 33.0 pg    MCHC 34.6 31.5 - 35.7 g/dL    RDW 14.7 12.3 - 15.4 %     RDW-SD 49.7 37.0 - 54.0 fl    MPV 9.0 6.0 - 12.0 fL    Platelets 263 140 - 450 10*3/mm3    Neutrophil % 82.4 (H) 42.7 - 76.0 %    Lymphocyte % 11.0 (L) 19.6 - 45.3 %    Monocyte % 5.4 5.0 - 12.0 %    Eosinophil % 0.1 (L) 0.3 - 6.2 %    Basophil % 0.7 0.0 - 1.5 %    Immature Grans % 0.4 0.0 - 0.5 %    Neutrophils, Absolute 6.68 1.70 - 7.00 10*3/mm3    Lymphocytes, Absolute 0.89 0.70 - 3.10 10*3/mm3    Monocytes, Absolute 0.44 0.10 - 0.90 10*3/mm3    Eosinophils, Absolute 0.01 0.00 - 0.40 10*3/mm3    Basophils, Absolute 0.06 0.00 - 0.20 10*3/mm3    Immature Grans, Absolute 0.03 0.00 - 0.05 10*3/mm3    nRBC 0.0 0.0 - 0.2 /100 WBC   Ethanol    Collection Time: 03/30/23  2:00 PM    Specimen: Blood   Result Value Ref Range    Ethanol 84 (H) 0 - 10 mg/dL   ECG 12 Lead ED Triage Standing Order; Chest Pain    Collection Time: 03/30/23  2:58 PM   Result Value Ref Range    QT Interval 384 ms    QTC Interval 472 ms   POC Creatinine    Collection Time: 03/30/23  3:18 PM    Specimen: Blood   Result Value Ref Range    Creatinine 1.00 mg/dL     Note: In addition to lab results from this visit, the labs listed above may include labs taken at another facility or during a different encounter within the last 24 hours. Please correlate lab times with ED admission and discharge times for further clarification of the services performed during this visit.    CT Angiogram Chest   Final Result   Impression:   No evidence of pulmonary embolism. No acute intrathoracic findings.      No acute abdominopelvic findings.      Fluid distention of the stomach which is nonspecific and correlate for any recent vomiting.      Hepatic steatosis.      Stable subcentimeter right lung nodules, unchanged since 11/1/2022.      There is evidence of emphysema. Correlate with patient history and risk factors and please assess if the patient meets criteria for routine low dose CT lung cancer screening.       Electronically Signed: Endy Haro     3/30/2023  3:03 PM EDT     Workstation ID: [id number]      CT Abdomen Pelvis With Contrast   Final Result   Impression:   No evidence of pulmonary embolism. No acute intrathoracic findings.      No acute abdominopelvic findings.      Fluid distention of the stomach which is nonspecific and correlate for any recent vomiting.      Hepatic steatosis.      Stable subcentimeter right lung nodules, unchanged since 11/1/2022.      There is evidence of emphysema. Correlate with patient history and risk factors and please assess if the patient meets criteria for routine low dose CT lung cancer screening.       Electronically Signed: Endy Haro     3/30/2023 3:03 PM EDT     Workstation ID: [id number]      XR Chest 1 View   Final Result   Impression:   No evidence of acute disease.      Electronically Signed: Jon Waller     3/30/2023 1:52 PM EDT     Workstation ID: [id number]        Vitals:    03/30/23 1345 03/30/23 1358 03/30/23 1400 03/30/23 1500   BP:   118/75 135/83   BP Location:       Patient Position:       Pulse: 102  103 90   Resp:       Temp:  98 °F (36.7 °C)     TempSrc:  Oral     SpO2: 99%  97% 96%   Weight:       Height:         Medications   sodium chloride 0.9 % flush 10 mL (has no administration in time range)   sodium chloride 0.9 % bolus 500 mL (0 mL Intravenous Stopped 3/30/23 1627)   ondansetron (ZOFRAN) injection 4 mg (4 mg Intravenous Given 3/30/23 1407)   iopamidol (ISOVUE-370) 76 % injection 100 mL (99 mL Intravenous Given 3/30/23 1427)     ECG/EMG Results (last 24 hours)     Procedure Component Value Units Date/Time    ECG 12 Lead ED Triage Standing Order; Chest Pain [[id number]] Collected: 03/30/23 1218     Updated: 03/30/23 1651     QT Interval 382 ms      QTC Interval 459 ms     Narrative:      Test Reason : ED Triage Standing Order~  Blood Pressure :   */*   mmHG  Vent. Rate :  87 BPM     Atrial Rate :  87 BPM     P-R Int : 136 ms          QRS Dur :  90 ms      QT Int : 382 ms       P-R-T Axes :  60  -4  68  degrees     QTc Int : 459 ms    Normal sinus rhythm  Normal ECG  When compared with ECG of 28-FEB-2023 12:26,  No significant change was found  Confirmed by MD Agustin Michael ([id number]) on 3/30/2023 4:49:13 PM    Referred By: TARA           Confirmed By: Anmol Agustin MD    ECG 12 Lead ED Triage Standing Order; Chest Pain [[id number]] Collected: 03/30/23 1458     Updated: 03/30/23 1652     QT Interval 384 ms      QTC Interval 472 ms     Narrative:      Test Reason : ED Triage Standing Order~  Blood Pressure :   */*   mmHG  Vent. Rate :  91 BPM     Atrial Rate :  91 BPM     P-R Int : 132 ms          QRS Dur :  70 ms      QT Int : 384 ms       P-R-T Axes :  25 -19  62 degrees     QTc Int : 472 ms    Normal sinus rhythm  Septal infarct , age undetermined  Abnormal ECG  When compared with ECG of 30-MAR-2023 12:18, (Unconfirmed)  Septal infarct is now present  Confirmed by MD Agustin Michael ([id number]) on 3/30/2023 4:49:31 PM    Referred By: TARA           Confirmed By: Anmol Agustin MD        ECG 12 Lead ED Triage Standing Order; Chest Pain   Final Result   Test Reason : ED Triage Standing Order~   Blood Pressure :   */*   mmHG   Vent. Rate :  91 BPM     Atrial Rate :  91 BPM      P-R Int : 132 ms          QRS Dur :  70 ms       QT Int : 384 ms       P-R-T Axes :  25 -19  62 degrees      QTc Int : 472 ms      Normal sinus rhythm   Septal infarct , age undetermined   Abnormal ECG   When compared with ECG of 30-MAR-2023 12:18, (Unconfirmed)   Septal infarct is now present   Confirmed by MD Agustin Michael ([id number]) on 3/30/2023 4:49:31 PM      Referred By: TARA           Confirmed By: Anmol Agustin MD      ECG 12 Lead ED Triage Standing Order; Chest Pain   Final Result   Test Reason : ED Triage Standing Order~   Blood Pressure :   */*   mmHG   Vent. Rate :  87 BPM     Atrial Rate :  87 BPM      P-R Int : 136 ms          QRS Dur :  90 ms       QT Int : 382 ms       P-R-T Axes :  60  -4  68 degrees      QTc Int : 459 ms      Normal sinus  rhythm   Normal ECG   When compared with ECG of 28-FEB-2023 12:26,   No significant change was found   Confirmed by MD Agustin Michael ([id number]) on 3/30/2023 4:49:13 PM      Referred By: EDMD           Confirmed By: Anmol Agustin MD                 [facility]    Final diagnoses:   Chest pain, unspecified type   Generalized abdominal pain   History of alcoholism (HCC)       ED Disposition  ED Disposition     ED Disposition   Discharge    Condition   Stable    Comment   --             [facility]  [address]  [address]  [facility] [address] [phone]  In 3 days           Medication List      New Prescriptions    ondansetron 4 MG tablet  Commonly known as: ZOFRAN  Take 1 tablet by mouth Every 8 (Eight) Hours As Needed for Nausea.        Changed    hydrOXYzine 50 MG tablet  Commonly known as: ATARAX  Take 0.5-1 tablets by mouth Every 8 (Eight) Hours As Needed for Anxiety.  What changed: how much to take           Where to Get Your Medications      These medications were sent to [facility] - [place], [place] - [address] - [phone]  - [phone] FX  [address], [facility] [address]    Phone: [phone]   · ondansetron 4 MG tablet          Esteban Agustin MD  03/30/23 3832

## 2023-03-30 NOTE — CASE MANAGEMENT/SOCIAL WORK
Continued Stay Note  [facility]     Patient Name: Leonard Crandall  MRN: [id number]  Today's Date: 3/30/2023    Admit Date: 3/30/2023    Plan: SW follow up--Lyft   Discharge Plan     Row Name 03/30/23 1738       Plan    Plan SW follow up--Lyft    Plan Comments Pt. has been accepted to [facility] in [place]. MSW arranged a Lyft for pt. MSW notified RN of the make, model and ETA of vehicle. MSW is available.    Final Discharge Disposition Code 01-Home or self care                  Discharge Codes    No documentation.                     ZEESHAN Khan

## 2023-03-30 NOTE — CASE MANAGEMENT/SOCIAL WORK
Continued Stay Note   [facility]     Patient Name: Leonard Crandall  MRN: [id number]  Today's Date: 3/30/2023    Admit Date: 3/30/2023    Plan: SW follow up--outpatient resources   Discharge Plan     Row Name 03/30/23 1603       Plan    Plan SW follow up--outpatient resources    Plan Comments MSW provided RN with resources for outpatient mental health and substance abuse. MSW is available.    Final Discharge Disposition Code 01 - home or self-care               Discharge Codes    No documentation.                     ZEESHAN Khan

## 2023-03-30 NOTE — CASE MANAGEMENT/SOCIAL WORK
"Continued Stay Note  [facility]     Patient Name: Leonard Crandall  MRN: [id number]  Today's Date: 3/30/2023    Admit Date: 3/30/2023    Plan: [facility]   Discharge Plan     Row Name 03/30/23 1653       Plan    Plan [facility]    Plan Comments Pt has been accepted to AUD treatment at [facility], [place] DUMONT is getting him a LYFT.    Row Name 03/30/23 1605       Plan    Plan Home with AUD treatment resources    Plan Comments This patient is known to our service line from a previous admission- he states today that he is \"detoxing\" and \"can't walk.\"  He does not wish to enter into detox at this time- we have provided him with a comprehensive treatment packet for outreach and follow-up,  The patient is alert and oriented at this time, he said that he just left [facility] and left there because they \"left me in a chair for 9 hours.\"  During his last admission here, he was supposed to go to [facility] for treatment, but left A instead.   We have encouraged him to reach out to [facility] for AUD treatment- this number has been provided to him.    Row Name 03/30/23 1603       Plan    Plan SW follow up--outpatient resources    Plan Comments ZEESHAN provided RN with resources for outpatient mental health and substance abuse. MSW is available.    Final Discharge Disposition Code 01 - home or self-care               Discharge Codes    No documentation.                     Emy Covarrubias, RN  MA,BSN-  Addiction Coordinator     " (4) excellent

## 2023-03-31 LAB — CREAT BLDA-MCNC: 1 MG/DL (ref 0.6–1.3)

## 2023-06-06 RX ORDER — LISINOPRIL 10 MG/1
TABLET ORAL
Qty: 90 TABLET | Refills: 0 | Status: SHIPPED | OUTPATIENT
Start: 2023-06-06

## 2024-03-28 ENCOUNTER — HOSPITAL ENCOUNTER (INPATIENT)
Facility: HOSPITAL | Age: 54
LOS: 2 days | Discharge: PSYCHIATRIC OR SUBSTANCE USE FACILITY | DRG: 897 | End: 2024-04-01
Attending: PSYCHIATRY & NEUROLOGY | Admitting: PSYCHIATRY & NEUROLOGY
Payer: MEDICAID

## 2024-03-28 ENCOUNTER — HOSPITAL ENCOUNTER (EMERGENCY)
Facility: HOSPITAL | Age: 54
Discharge: PSYCHIATRIC OR SUBSTANCE USE FACILITY | DRG: 897 | End: 2024-03-28
Attending: EMERGENCY MEDICINE
Payer: MEDICAID

## 2024-03-28 VITALS
OXYGEN SATURATION: 98 % | TEMPERATURE: 98.3 F | DIASTOLIC BLOOD PRESSURE: 93 MMHG | RESPIRATION RATE: 20 BRPM | HEART RATE: 109 BPM | WEIGHT: 260 LBS | HEIGHT: 74 IN | SYSTOLIC BLOOD PRESSURE: 141 MMHG | BODY MASS INDEX: 33.37 KG/M2

## 2024-03-28 DIAGNOSIS — F10.10 ALCOHOL ABUSE: Primary | ICD-10-CM

## 2024-03-28 PROBLEM — F19.20 CHEMICAL DEPENDENCY: Status: ACTIVE | Noted: 2024-03-28

## 2024-03-28 LAB
ALBUMIN SERPL-MCNC: 4.4 G/DL (ref 3.5–5.2)
ALBUMIN/GLOB SERPL: 1.5 G/DL
ALP SERPL-CCNC: 93 U/L (ref 39–117)
ALT SERPL W P-5'-P-CCNC: 36 U/L (ref 1–41)
AMPHET+METHAMPHET UR QL: NEGATIVE
AMPHETAMINES UR QL: NEGATIVE
ANION GAP SERPL CALCULATED.3IONS-SCNC: 13.5 MMOL/L (ref 5–15)
AST SERPL-CCNC: 35 U/L (ref 1–40)
BACTERIA UR QL AUTO: ABNORMAL /HPF
BARBITURATES UR QL SCN: NEGATIVE
BASOPHILS # BLD AUTO: 0.08 10*3/MM3 (ref 0–0.2)
BASOPHILS NFR BLD AUTO: 0.8 % (ref 0–1.5)
BENZODIAZ UR QL SCN: NEGATIVE
BILIRUB SERPL-MCNC: 0.7 MG/DL (ref 0–1.2)
BILIRUB UR QL STRIP: ABNORMAL
BUN SERPL-MCNC: 9 MG/DL (ref 6–20)
BUN/CREAT SERPL: 9.6 (ref 7–25)
BUPRENORPHINE SERPL-MCNC: NEGATIVE NG/ML
CALCIUM SPEC-SCNC: 9 MG/DL (ref 8.6–10.5)
CANNABINOIDS SERPL QL: POSITIVE
CHLORIDE SERPL-SCNC: 100 MMOL/L (ref 98–107)
CLARITY UR: CLEAR
CO2 SERPL-SCNC: 22.5 MMOL/L (ref 22–29)
COCAINE UR QL: NEGATIVE
COLOR UR: ABNORMAL
CREAT SERPL-MCNC: 0.94 MG/DL (ref 0.76–1.27)
DEPRECATED RDW RBC AUTO: 49.1 FL (ref 37–54)
EGFRCR SERPLBLD CKD-EPI 2021: 98.1 ML/MIN/1.73
EOSINOPHIL # BLD AUTO: 0.18 10*3/MM3 (ref 0–0.4)
EOSINOPHIL NFR BLD AUTO: 1.7 % (ref 0.3–6.2)
ERYTHROCYTE [DISTWIDTH] IN BLOOD BY AUTOMATED COUNT: 13.9 % (ref 12.3–15.4)
ETHANOL BLD-MCNC: <10 MG/DL (ref 0–10)
ETHANOL UR QL: <0.01 %
FENTANYL UR-MCNC: NEGATIVE NG/ML
GLOBULIN UR ELPH-MCNC: 2.9 GM/DL
GLUCOSE SERPL-MCNC: 116 MG/DL (ref 65–99)
GLUCOSE UR STRIP-MCNC: NEGATIVE MG/DL
HCT VFR BLD AUTO: 48.7 % (ref 37.5–51)
HGB BLD-MCNC: 16.4 G/DL (ref 13–17.7)
HGB UR QL STRIP.AUTO: NEGATIVE
HYALINE CASTS UR QL AUTO: ABNORMAL /LPF
IMM GRANULOCYTES # BLD AUTO: 0.02 10*3/MM3 (ref 0–0.05)
IMM GRANULOCYTES NFR BLD AUTO: 0.2 % (ref 0–0.5)
KETONES UR QL STRIP: ABNORMAL
LEUKOCYTE ESTERASE UR QL STRIP.AUTO: ABNORMAL
LYMPHOCYTES # BLD AUTO: 2.74 10*3/MM3 (ref 0.7–3.1)
LYMPHOCYTES NFR BLD AUTO: 25.8 % (ref 19.6–45.3)
MAGNESIUM SERPL-MCNC: 1.7 MG/DL (ref 1.6–2.6)
MCH RBC QN AUTO: 32 PG (ref 26.6–33)
MCHC RBC AUTO-ENTMCNC: 33.7 G/DL (ref 31.5–35.7)
MCV RBC AUTO: 94.9 FL (ref 79–97)
METHADONE UR QL SCN: NEGATIVE
MONOCYTES # BLD AUTO: 1.22 10*3/MM3 (ref 0.1–0.9)
MONOCYTES NFR BLD AUTO: 11.5 % (ref 5–12)
MUCOUS THREADS URNS QL MICRO: ABNORMAL /HPF
NEUTROPHILS NFR BLD AUTO: 6.36 10*3/MM3 (ref 1.7–7)
NEUTROPHILS NFR BLD AUTO: 60 % (ref 42.7–76)
NITRITE UR QL STRIP: NEGATIVE
NRBC BLD AUTO-RTO: 0 /100 WBC (ref 0–0.2)
OPIATES UR QL: NEGATIVE
OXYCODONE UR QL SCN: NEGATIVE
PCP UR QL SCN: NEGATIVE
PH UR STRIP.AUTO: 7 [PH] (ref 5–8)
PLATELET # BLD AUTO: 241 10*3/MM3 (ref 140–450)
PMV BLD AUTO: 9.1 FL (ref 6–12)
POTASSIUM SERPL-SCNC: 3.8 MMOL/L (ref 3.5–5.2)
PROT SERPL-MCNC: 7.3 G/DL (ref 6–8.5)
PROT UR QL STRIP: ABNORMAL
RBC # BLD AUTO: 5.13 10*6/MM3 (ref 4.14–5.8)
RBC # UR STRIP: ABNORMAL /HPF
REF LAB TEST METHOD: ABNORMAL
SODIUM SERPL-SCNC: 136 MMOL/L (ref 136–145)
SP GR UR STRIP: >1.03 (ref 1–1.03)
SQUAMOUS #/AREA URNS HPF: ABNORMAL /HPF
TRICYCLICS UR QL SCN: NEGATIVE
UROBILINOGEN UR QL STRIP: ABNORMAL
WBC # UR STRIP: ABNORMAL /HPF
WBC NRBC COR # BLD AUTO: 10.6 10*3/MM3 (ref 3.4–10.8)

## 2024-03-28 PROCEDURE — 80053 COMPREHEN METABOLIC PANEL: CPT | Performed by: EMERGENCY MEDICINE

## 2024-03-28 PROCEDURE — 93010 ELECTROCARDIOGRAM REPORT: CPT | Performed by: INTERNAL MEDICINE

## 2024-03-28 PROCEDURE — 83735 ASSAY OF MAGNESIUM: CPT | Performed by: EMERGENCY MEDICINE

## 2024-03-28 PROCEDURE — 81001 URINALYSIS AUTO W/SCOPE: CPT | Performed by: EMERGENCY MEDICINE

## 2024-03-28 PROCEDURE — G0378 HOSPITAL OBSERVATION PER HR: HCPCS

## 2024-03-28 PROCEDURE — 82077 ASSAY SPEC XCP UR&BREATH IA: CPT | Performed by: EMERGENCY MEDICINE

## 2024-03-28 PROCEDURE — 93005 ELECTROCARDIOGRAM TRACING: CPT | Performed by: STUDENT IN AN ORGANIZED HEALTH CARE EDUCATION/TRAINING PROGRAM

## 2024-03-28 PROCEDURE — 80307 DRUG TEST PRSMV CHEM ANLYZR: CPT | Performed by: EMERGENCY MEDICINE

## 2024-03-28 PROCEDURE — 63710000001 ONDANSETRON ODT 4 MG TABLET DISPERSIBLE: Performed by: PSYCHIATRY & NEUROLOGY

## 2024-03-28 PROCEDURE — 85025 COMPLETE CBC W/AUTO DIFF WBC: CPT | Performed by: EMERGENCY MEDICINE

## 2024-03-28 PROCEDURE — 36415 COLL VENOUS BLD VENIPUNCTURE: CPT

## 2024-03-28 PROCEDURE — 99285 EMERGENCY DEPT VISIT HI MDM: CPT

## 2024-03-28 RX ORDER — LOPERAMIDE HYDROCHLORIDE 2 MG/1
2 CAPSULE ORAL
Status: DISCONTINUED | OUTPATIENT
Start: 2024-03-28 | End: 2024-04-01 | Stop reason: HOSPADM

## 2024-03-28 RX ORDER — METHOCARBAMOL 500 MG/1
500 TABLET, FILM COATED ORAL 3 TIMES DAILY PRN
Status: DISCONTINUED | OUTPATIENT
Start: 2024-03-28 | End: 2024-04-01 | Stop reason: HOSPADM

## 2024-03-28 RX ORDER — BENZONATATE 100 MG/1
100 CAPSULE ORAL 3 TIMES DAILY PRN
Status: DISCONTINUED | OUTPATIENT
Start: 2024-03-28 | End: 2024-04-01 | Stop reason: HOSPADM

## 2024-03-28 RX ORDER — FOLIC ACID 1 MG/1
1 TABLET ORAL DAILY
Status: DISCONTINUED | OUTPATIENT
Start: 2024-03-29 | End: 2024-04-01

## 2024-03-28 RX ORDER — AMITRIPTYLINE HYDROCHLORIDE 25 MG/1
50 TABLET, FILM COATED ORAL NIGHTLY
Status: DISCONTINUED | OUTPATIENT
Start: 2024-03-29 | End: 2024-04-01 | Stop reason: HOSPADM

## 2024-03-28 RX ORDER — QUETIAPINE FUMARATE 100 MG/1
50 TABLET, FILM COATED ORAL 3 TIMES DAILY
Status: CANCELLED | OUTPATIENT
Start: 2024-03-28

## 2024-03-28 RX ORDER — BENZTROPINE MESYLATE 1 MG/ML
1 INJECTION INTRAMUSCULAR; INTRAVENOUS ONCE AS NEEDED
Status: DISCONTINUED | OUTPATIENT
Start: 2024-03-28 | End: 2024-04-01 | Stop reason: HOSPADM

## 2024-03-28 RX ORDER — ONDANSETRON 4 MG/1
4 TABLET, ORALLY DISINTEGRATING ORAL EVERY 6 HOURS PRN
Status: DISCONTINUED | OUTPATIENT
Start: 2024-03-28 | End: 2024-04-01 | Stop reason: HOSPADM

## 2024-03-28 RX ORDER — MELOXICAM 7.5 MG/1
15 TABLET ORAL DAILY
Status: CANCELLED | OUTPATIENT
Start: 2024-03-29

## 2024-03-28 RX ORDER — ALUMINA, MAGNESIA, AND SIMETHICONE 2400; 2400; 240 MG/30ML; MG/30ML; MG/30ML
15 SUSPENSION ORAL EVERY 6 HOURS PRN
Status: DISCONTINUED | OUTPATIENT
Start: 2024-03-28 | End: 2024-04-01 | Stop reason: HOSPADM

## 2024-03-28 RX ORDER — PRAZOSIN HYDROCHLORIDE 1 MG/1
2 CAPSULE ORAL NIGHTLY
Status: CANCELLED | OUTPATIENT
Start: 2024-03-28

## 2024-03-28 RX ORDER — QUETIAPINE FUMARATE 50 MG/1
50 TABLET, FILM COATED ORAL 3 TIMES DAILY
COMMUNITY

## 2024-03-28 RX ORDER — AMITRIPTYLINE HYDROCHLORIDE 50 MG/1
50 TABLET, FILM COATED ORAL NIGHTLY
COMMUNITY
End: 2024-04-04 | Stop reason: HOSPADM

## 2024-03-28 RX ORDER — QUETIAPINE FUMARATE 100 MG/1
50 TABLET, FILM COATED ORAL 3 TIMES DAILY
Status: DISCONTINUED | OUTPATIENT
Start: 2024-03-29 | End: 2024-04-01 | Stop reason: HOSPADM

## 2024-03-28 RX ORDER — MULTIPLE VITAMINS W/ MINERALS TAB 9MG-400MCG
1 TAB ORAL DAILY
Status: CANCELLED | OUTPATIENT
Start: 2024-03-29

## 2024-03-28 RX ORDER — BUSPIRONE HYDROCHLORIDE 10 MG/1
10 TABLET ORAL 2 TIMES DAILY
COMMUNITY

## 2024-03-28 RX ORDER — BUSPIRONE HYDROCHLORIDE 10 MG/1
10 TABLET ORAL 2 TIMES DAILY
Status: DISCONTINUED | OUTPATIENT
Start: 2024-03-29 | End: 2024-04-01 | Stop reason: HOSPADM

## 2024-03-28 RX ORDER — FOLIC ACID 1 MG/1
1 TABLET ORAL DAILY
Status: CANCELLED | OUTPATIENT
Start: 2024-03-29

## 2024-03-28 RX ORDER — LISINOPRIL 10 MG/1
20 TABLET ORAL DAILY
Status: CANCELLED | OUTPATIENT
Start: 2024-03-29

## 2024-03-28 RX ORDER — DULOXETIN HYDROCHLORIDE 60 MG/1
60 CAPSULE, DELAYED RELEASE ORAL DAILY
Status: DISCONTINUED | OUTPATIENT
Start: 2024-03-29 | End: 2024-04-01 | Stop reason: HOSPADM

## 2024-03-28 RX ORDER — FOLIC ACID 1 MG/1
1 TABLET ORAL DAILY
COMMUNITY
End: 2024-04-01 | Stop reason: HOSPADM

## 2024-03-28 RX ORDER — NAPROXEN 250 MG/1
500 TABLET ORAL 2 TIMES DAILY PRN
Status: CANCELLED | OUTPATIENT
Start: 2024-03-28

## 2024-03-28 RX ORDER — BENZTROPINE MESYLATE 1 MG/1
2 TABLET ORAL ONCE AS NEEDED
Status: DISCONTINUED | OUTPATIENT
Start: 2024-03-28 | End: 2024-04-01 | Stop reason: HOSPADM

## 2024-03-28 RX ORDER — MULTIVITAMIN WITH IRON
2 TABLET ORAL DAILY
Status: DISCONTINUED | OUTPATIENT
Start: 2024-03-29 | End: 2024-04-01 | Stop reason: HOSPADM

## 2024-03-28 RX ORDER — BUDESONIDE AND FORMOTEROL FUMARATE DIHYDRATE 160; 4.5 UG/1; UG/1
2 AEROSOL RESPIRATORY (INHALATION)
Status: DISCONTINUED | OUTPATIENT
Start: 2024-03-29 | End: 2024-04-01 | Stop reason: HOSPADM

## 2024-03-28 RX ORDER — ACETAMINOPHEN 325 MG/1
650 TABLET ORAL EVERY 6 HOURS PRN
Status: CANCELLED | OUTPATIENT
Start: 2024-03-28

## 2024-03-28 RX ORDER — PANTOPRAZOLE SODIUM 40 MG/1
40 TABLET, DELAYED RELEASE ORAL DAILY
COMMUNITY

## 2024-03-28 RX ORDER — ACETAMINOPHEN 325 MG/1
650 TABLET ORAL EVERY 6 HOURS PRN
Status: DISCONTINUED | OUTPATIENT
Start: 2024-03-28 | End: 2024-04-01 | Stop reason: HOSPADM

## 2024-03-28 RX ORDER — PREDNISONE 20 MG/1
20 TABLET ORAL 3 TIMES DAILY
Status: ON HOLD | COMMUNITY
End: 2024-03-28

## 2024-03-28 RX ORDER — ECHINACEA PURPUREA EXTRACT 125 MG
2 TABLET ORAL AS NEEDED
Status: DISCONTINUED | OUTPATIENT
Start: 2024-03-28 | End: 2024-04-01 | Stop reason: HOSPADM

## 2024-03-28 RX ORDER — ALBUTEROL SULFATE 90 UG/1
2 AEROSOL, METERED RESPIRATORY (INHALATION) EVERY 4 HOURS PRN
Status: CANCELLED | OUTPATIENT
Start: 2024-03-28

## 2024-03-28 RX ORDER — HYDROXYZINE 50 MG/1
50 TABLET, FILM COATED ORAL EVERY 6 HOURS PRN
Status: DISCONTINUED | OUTPATIENT
Start: 2024-03-28 | End: 2024-04-01 | Stop reason: HOSPADM

## 2024-03-28 RX ORDER — MULTIPLE VITAMINS W/ MINERALS TAB 9MG-400MCG
1 TAB ORAL DAILY
COMMUNITY
End: 2024-04-01 | Stop reason: HOSPADM

## 2024-03-28 RX ORDER — PRAZOSIN HYDROCHLORIDE 2 MG/1
2 CAPSULE ORAL NIGHTLY
COMMUNITY

## 2024-03-28 RX ORDER — METHOCARBAMOL 500 MG/1
500 TABLET, FILM COATED ORAL 3 TIMES DAILY PRN
COMMUNITY
End: 2024-04-01 | Stop reason: HOSPADM

## 2024-03-28 RX ORDER — MULTIPLE VITAMINS W/ MINERALS TAB 9MG-400MCG
1 TAB ORAL DAILY
Status: DISCONTINUED | OUTPATIENT
Start: 2024-03-29 | End: 2024-04-01 | Stop reason: HOSPADM

## 2024-03-28 RX ORDER — NALOXONE HYDROCHLORIDE 4 MG/.1ML
1 SPRAY NASAL AS NEEDED
COMMUNITY
End: 2024-04-01 | Stop reason: HOSPADM

## 2024-03-28 RX ORDER — BUDESONIDE AND FORMOTEROL FUMARATE DIHYDRATE 160; 4.5 UG/1; UG/1
2 AEROSOL RESPIRATORY (INHALATION)
Status: CANCELLED | OUTPATIENT
Start: 2024-03-28

## 2024-03-28 RX ORDER — NAPROXEN 500 MG/1
500 TABLET ORAL 2 TIMES DAILY PRN
COMMUNITY
End: 2024-04-01 | Stop reason: HOSPADM

## 2024-03-28 RX ORDER — ALBUTEROL SULFATE 2.5 MG/3ML
2.5 SOLUTION RESPIRATORY (INHALATION) EVERY 6 HOURS PRN
Status: DISCONTINUED | OUTPATIENT
Start: 2024-03-28 | End: 2024-04-01 | Stop reason: HOSPADM

## 2024-03-28 RX ORDER — SENNOSIDES 8.6 MG
650 CAPSULE ORAL EVERY 6 HOURS PRN
COMMUNITY
End: 2024-04-01 | Stop reason: HOSPADM

## 2024-03-28 RX ORDER — DULOXETIN HYDROCHLORIDE 60 MG/1
60 CAPSULE, DELAYED RELEASE ORAL DAILY
Status: CANCELLED | OUTPATIENT
Start: 2024-03-29

## 2024-03-28 RX ORDER — IBUPROFEN 400 MG/1
400 TABLET ORAL EVERY 6 HOURS PRN
Status: DISCONTINUED | OUTPATIENT
Start: 2024-03-28 | End: 2024-04-01 | Stop reason: HOSPADM

## 2024-03-28 RX ORDER — PANTOPRAZOLE SODIUM 40 MG/1
40 TABLET, DELAYED RELEASE ORAL DAILY
Status: CANCELLED | OUTPATIENT
Start: 2024-03-29

## 2024-03-28 RX ORDER — DULOXETIN HYDROCHLORIDE 60 MG/1
60 CAPSULE, DELAYED RELEASE ORAL DAILY
COMMUNITY

## 2024-03-28 RX ORDER — PRAZOSIN HYDROCHLORIDE 1 MG/1
2 CAPSULE ORAL NIGHTLY
Status: DISCONTINUED | OUTPATIENT
Start: 2024-03-29 | End: 2024-04-01 | Stop reason: HOSPADM

## 2024-03-28 RX ORDER — NICOTINE 21 MG/24HR
1 PATCH, TRANSDERMAL 24 HOURS TRANSDERMAL
Status: DISCONTINUED | OUTPATIENT
Start: 2024-03-29 | End: 2024-04-01 | Stop reason: HOSPADM

## 2024-03-28 RX ORDER — LISINOPRIL 10 MG/1
20 TABLET ORAL DAILY
Status: DISCONTINUED | OUTPATIENT
Start: 2024-03-29 | End: 2024-04-01 | Stop reason: HOSPADM

## 2024-03-28 RX ORDER — FLUTICASONE PROPIONATE AND SALMETEROL 250; 50 UG/1; UG/1
1 POWDER RESPIRATORY (INHALATION)
COMMUNITY

## 2024-03-28 RX ORDER — BUSPIRONE HYDROCHLORIDE 10 MG/1
10 TABLET ORAL 2 TIMES DAILY
Status: CANCELLED | OUTPATIENT
Start: 2024-03-28

## 2024-03-28 RX ORDER — TRAZODONE HYDROCHLORIDE 50 MG/1
50 TABLET ORAL NIGHTLY PRN
Status: DISCONTINUED | OUTPATIENT
Start: 2024-03-28 | End: 2024-04-01 | Stop reason: HOSPADM

## 2024-03-28 RX ORDER — TIOTROPIUM BROMIDE INHALATION SPRAY 1.56 UG/1
2 SPRAY, METERED RESPIRATORY (INHALATION)
COMMUNITY

## 2024-03-28 RX ORDER — MELOXICAM 15 MG/1
15 TABLET ORAL DAILY
COMMUNITY
End: 2024-04-01 | Stop reason: HOSPADM

## 2024-03-28 RX ORDER — MAGNESIUM OXIDE 400 MG/1
400 TABLET ORAL DAILY
COMMUNITY
End: 2024-04-01 | Stop reason: HOSPADM

## 2024-03-28 RX ORDER — AMITRIPTYLINE HYDROCHLORIDE 25 MG/1
50 TABLET, FILM COATED ORAL NIGHTLY
Status: CANCELLED | OUTPATIENT
Start: 2024-03-28

## 2024-03-28 RX ORDER — FAMOTIDINE 20 MG/1
20 TABLET, FILM COATED ORAL 2 TIMES DAILY PRN
Status: DISCONTINUED | OUTPATIENT
Start: 2024-03-28 | End: 2024-04-01 | Stop reason: HOSPADM

## 2024-03-28 RX ORDER — METHOCARBAMOL 500 MG/1
500 TABLET, FILM COATED ORAL 3 TIMES DAILY PRN
Status: CANCELLED | OUTPATIENT
Start: 2024-03-28

## 2024-03-28 RX ORDER — PANTOPRAZOLE SODIUM 40 MG/1
40 TABLET, DELAYED RELEASE ORAL DAILY
Status: DISCONTINUED | OUTPATIENT
Start: 2024-03-29 | End: 2024-04-01

## 2024-03-28 RX ORDER — LISINOPRIL 20 MG/1
20 TABLET ORAL DAILY
COMMUNITY

## 2024-03-28 RX ADMIN — AMITRIPTYLINE HYDROCHLORIDE 50 MG: 25 TABLET, FILM COATED ORAL at 23:30

## 2024-03-28 RX ADMIN — ONDANSETRON 4 MG: 4 TABLET, ORALLY DISINTEGRATING ORAL at 21:27

## 2024-03-28 RX ADMIN — PRAZOSIN HYDROCHLORIDE 2 MG: 1 CAPSULE ORAL at 23:30

## 2024-03-28 RX ADMIN — ACETAMINOPHEN 650 MG: 325 TABLET ORAL at 21:27

## 2024-03-28 RX ADMIN — BUSPIRONE HYDROCHLORIDE 10 MG: 10 TABLET ORAL at 23:30

## 2024-03-28 RX ADMIN — QUETIAPINE FUMARATE 50 MG: 100 TABLET ORAL at 23:30

## 2024-03-28 RX ADMIN — HYDROXYZINE HYDROCHLORIDE 50 MG: 50 TABLET ORAL at 21:27

## 2024-03-28 RX ADMIN — TRAZODONE HYDROCHLORIDE 50 MG: 50 TABLET ORAL at 21:27

## 2024-03-28 NOTE — NURSING NOTE
Per staff from [facility], if admitted the patient needs to get him to sign a release for Elizabeth husain at [facility].

## 2024-03-28 NOTE — NURSING NOTE
Pt to intake. Explained to patient that staff would wait for his alcohol level to come back before completing assessment.     Per [facility] recovery staff. Pt just came to them about 2 o'clock today and they were told to bring him right here for detox. Explained intake process and informed him it could take a couple hours to complete the process. Information verbalized.

## 2024-03-28 NOTE — ED PROVIDER NOTES
"Subjective   History of Present Illness  53-year-old male presents secondary to need for alcohol detox.  Patient states he drinks about two fifths per day.  He has done this for several years.  He denies any suicidal homicidal ideation.  He has a past medical history of alcohol abuse, hypertension, COPD.  He presents by private vehicle.  He states he last drank about a day ago.  He was at a rehab facility and was sent here for detox.        Review of Systems   Constitutional: Negative.  Negative for fever.   HENT: Negative.     Respiratory: Negative.     Cardiovascular: Negative.  Negative for chest pain.   Gastrointestinal: Negative.  Negative for abdominal pain.   Endocrine: Negative.    Genitourinary: Negative.  Negative for dysuria.   Skin: Negative.    Neurological: Negative.    Psychiatric/Behavioral: Negative.  Negative for suicidal ideas.    All other systems reviewed and are negative.      Past Medical History:   Diagnosis Date    Alcoholism 01/20/2022    Anxiety     Bipolar disorder     COPD (chronic obstructive pulmonary disease) 01/20/2022    Depression     Hypertension     Tobacco abuse 01/20/2022    Withdrawal symptoms, alcohol        Allergies   Allergen Reactions    Fish-Derived Products Anaphylaxis     \"My neck swells up\"       History reviewed. No pertinent surgical history.    Family History   Problem Relation Age of Onset    Diabetes Mother     Acne Mother     Heart attack Father        Social History     Socioeconomic History    Marital status: Single   Tobacco Use    Smoking status: Every Day     Current packs/day: 1.00     Average packs/day: 1 pack/day for 40.0 years (40.0 ttl pk-yrs)     Types: Cigarettes     Start date: 1985    Smokeless tobacco: Former   Vaping Use    Vaping status: Never Used   Substance and Sexual Activity    Alcohol use: Yes     Comment: two 5ths daily x 1 mo    Drug use: No     Comment: Pt denies, \"I don't smoke weed.\"    Sexual activity: Defer           Objective "   Physical Exam  Vitals and nursing note reviewed.   Constitutional:       General: He is not in acute distress.     Appearance: He is well-developed. He is not diaphoretic.   HENT:      Head: Normocephalic and atraumatic.      Right Ear: External ear normal.      Left Ear: External ear normal.      Nose: Nose normal.   Eyes:      Conjunctiva/sclera: Conjunctivae normal.      Pupils: Pupils are equal, round, and reactive to light.   Neck:      Vascular: No JVD.      Trachea: No tracheal deviation.   Cardiovascular:      Rate and Rhythm: Normal rate and regular rhythm.      Heart sounds: Normal heart sounds. No murmur heard.  Pulmonary:      Effort: Pulmonary effort is normal. No respiratory distress.      Breath sounds: Normal breath sounds. No wheezing.   Abdominal:      General: Bowel sounds are normal.      Palpations: Abdomen is soft.      Tenderness: There is no abdominal tenderness.   Musculoskeletal:         General: No deformity. Normal range of motion.      Cervical back: Normal range of motion and neck supple.   Skin:     General: Skin is warm and dry.      Coloration: Skin is not pale.      Findings: No erythema or rash.   Neurological:      Mental Status: He is alert and oriented to person, place, and time.      Cranial Nerves: No cranial nerve deficit.   Psychiatric:         Behavior: Behavior normal.         Thought Content: Thought content normal. Thought content does not include homicidal or suicidal ideation.         Procedures           ED Course  ED Course as of 03/29/24 1454   Thu Mar 28, 2024   2023 Took over care of patient pending evaluation by intake.  Intake evaluated the patient stated that they will admit him to the behavioral health unit. [AS]   2049 EKG obtained and independently interpreted as normal sinus rhythm without acute ischemic findings or arrhythmia  Electronically signed by Bharathi Huerta MD, 03/28/24, 8:49 PM EDT.   [AS]      ED Course User Index  [AS] Bharathi Huerta MD                                  Results for orders placed or performed during the hospital encounter of 03/28/24   Comprehensive Metabolic Panel    Specimen: Blood   Result Value Ref Range    Glucose 116 (H) 65 - 99 mg/dL    BUN 9 6 - 20 mg/dL    Creatinine 0.94 0.76 - 1.27 mg/dL    Sodium 136 136 - 145 mmol/L    Potassium 3.8 3.5 - 5.2 mmol/L    Chloride 100 98 - 107 mmol/L    CO2 22.5 22.0 - 29.0 mmol/L    Calcium 9.0 8.6 - 10.5 mg/dL    Total Protein 7.3 6.0 - 8.5 g/dL    Albumin 4.4 3.5 - 5.2 g/dL    ALT (SGPT) 36 1 - 41 U/L    AST (SGOT) 35 1 - 40 U/L    Alkaline Phosphatase 93 39 - 117 U/L    Total Bilirubin 0.7 0.0 - 1.2 mg/dL    Globulin 2.9 gm/dL    A/G Ratio 1.5 g/dL    BUN/Creatinine Ratio 9.6 7.0 - 25.0    Anion Gap 13.5 5.0 - 15.0 mmol/L    eGFR 98.1 >60.0 mL/min/1.73   Urinalysis With Microscopic If Indicated (No Culture) - Urine, Clean Catch    Specimen: Urine, Clean Catch   Result Value Ref Range    Color, UA Dark Yellow (A) Yellow, Straw    Appearance, UA Clear Clear    pH, UA 7.0 5.0 - 8.0    Specific Gravity, UA >1.030 (H) 1.005 - 1.030    Glucose, UA Negative Negative    Ketones, UA Trace (A) Negative    Bilirubin, UA Small (1+) (A) Negative    Blood, UA Negative Negative    Protein,  mg/dL (2+) (A) Negative    Leuk Esterase, UA Trace (A) Negative    Nitrite, UA Negative Negative    Urobilinogen, UA 1.0 E.U./dL 0.2 - 1.0 E.U./dL   Urine Drug Screen - Urine, Clean Catch    Specimen: Urine, Clean Catch   Result Value Ref Range    THC, Screen, Urine Positive (A) Negative    Phencyclidine (PCP), Urine Negative Negative    Cocaine Screen, Urine Negative Negative    Methamphetamine, Ur Negative Negative    Opiate Screen Negative Negative    Amphetamine Screen, Urine Negative Negative    Benzodiazepine Screen, Urine Negative Negative    Tricyclic Antidepressants Screen Negative Negative    Methadone Screen, Urine Negative Negative    Barbiturates Screen, Urine Negative Negative    Oxycodone Screen,  Urine Negative Negative    Buprenorphine, Screen, Urine Negative Negative   Ethanol    Specimen: Blood   Result Value Ref Range    Ethanol <10 0 - 10 mg/dL    Ethanol % <0.010 %   Magnesium    Specimen: Blood   Result Value Ref Range    Magnesium 1.7 1.6 - 2.6 mg/dL   CBC Auto Differential    Specimen: Blood   Result Value Ref Range    WBC 10.60 3.40 - 10.80 10*3/mm3    RBC 5.13 4.14 - 5.80 10*6/mm3    Hemoglobin 16.4 13.0 - 17.7 g/dL    Hematocrit 48.7 37.5 - 51.0 %    MCV 94.9 79.0 - 97.0 fL    MCH 32.0 26.6 - 33.0 pg    MCHC 33.7 31.5 - 35.7 g/dL    RDW 13.9 12.3 - 15.4 %    RDW-SD 49.1 37.0 - 54.0 fl    MPV 9.1 6.0 - 12.0 fL    Platelets 241 140 - 450 10*3/mm3    Neutrophil % 60.0 42.7 - 76.0 %    Lymphocyte % 25.8 19.6 - 45.3 %    Monocyte % 11.5 5.0 - 12.0 %    Eosinophil % 1.7 0.3 - 6.2 %    Basophil % 0.8 0.0 - 1.5 %    Immature Grans % 0.2 0.0 - 0.5 %    Neutrophils, Absolute 6.36 1.70 - 7.00 10*3/mm3    Lymphocytes, Absolute 2.74 0.70 - 3.10 10*3/mm3    Monocytes, Absolute 1.22 (H) 0.10 - 0.90 10*3/mm3    Eosinophils, Absolute 0.18 0.00 - 0.40 10*3/mm3    Basophils, Absolute 0.08 0.00 - 0.20 10*3/mm3    Immature Grans, Absolute 0.02 0.00 - 0.05 10*3/mm3    nRBC 0.0 0.0 - 0.2 /100 WBC   Fentanyl, Urine - Urine, Clean Catch    Specimen: Urine, Clean Catch   Result Value Ref Range    Fentanyl, Urine Negative Negative   Urinalysis, Microscopic Only - Urine, Clean Catch    Specimen: Urine, Clean Catch   Result Value Ref Range    RBC, UA 0-2 None Seen, 0-2 /HPF    WBC, UA 3-5 (A) None Seen, 0-2 /HPF    Bacteria, UA Trace (A) None Seen /HPF    Squamous Epithelial Cells, UA 0-2 None Seen, 0-2 /HPF    Hyaline Casts, UA None Seen None Seen /LPF    Mucus, UA Moderate/2+ (A) None Seen, Trace /HPF    Methodology Automated Microscopy    ECG 12 Lead Other; Admission   Result Value Ref Range    QT Interval 364 ms    QTC Interval 462 ms                 Medical Decision Making  53-year-old male presents secondary to need  for alcohol detox.  Patient states he drinks about two fifths per day.  He has done this for several years.  He denies any suicidal homicidal ideation.  He has a past medical history of alcohol abuse, hypertension, COPD.  He presents by private vehicle.  He states he last drank about a day ago.  He was at a rehab facility and was sent here for detox.    Problems Addressed:  Alcohol abuse: complicated acute illness or injury    Amount and/or Complexity of Data Reviewed  Labs: ordered. Decision-making details documented in ED Course.  ECG/medicine tests: ordered.  Discussion of management or test interpretation with external provider(s): On-call psychiatrist for the intake nurse.        Final diagnoses:   Alcohol abuse       ED Disposition  ED Disposition       ED Disposition   DC/Transfer to Behavioral Health    Condition   Stable    Comment   --               No follow-up provider specified.       Medication List      No changes were made to your prescriptions during this visit.            Baldev Kumar PA  03/28/24 7301       Baldev Kumar PA  03/29/24 9084

## 2024-03-29 PROBLEM — F17.200 NICOTINE USE DISORDER: Status: ACTIVE | Noted: 2022-01-20

## 2024-03-29 LAB
QT INTERVAL: 364 MS
QTC INTERVAL: 462 MS

## 2024-03-29 PROCEDURE — 63710000001 ONDANSETRON ODT 4 MG TABLET DISPERSIBLE: Performed by: PSYCHIATRY & NEUROLOGY

## 2024-03-29 PROCEDURE — 94664 DEMO&/EVAL PT USE INHALER: CPT

## 2024-03-29 PROCEDURE — G0378 HOSPITAL OBSERVATION PER HR: HCPCS

## 2024-03-29 PROCEDURE — 94760 N-INVAS EAR/PLS OXIMETRY 1: CPT

## 2024-03-29 PROCEDURE — 94799 UNLISTED PULMONARY SVC/PX: CPT

## 2024-03-29 PROCEDURE — 94640 AIRWAY INHALATION TREATMENT: CPT

## 2024-03-29 PROCEDURE — 99223 1ST HOSP IP/OBS HIGH 75: CPT | Performed by: PSYCHIATRY & NEUROLOGY

## 2024-03-29 RX ADMIN — Medication 2 TABLET: at 08:38

## 2024-03-29 RX ADMIN — QUETIAPINE FUMARATE 50 MG: 100 TABLET ORAL at 08:41

## 2024-03-29 RX ADMIN — PANTOPRAZOLE SODIUM 40 MG: 40 TABLET, DELAYED RELEASE ORAL at 08:38

## 2024-03-29 RX ADMIN — BUDESONIDE AND FORMOTEROL FUMARATE DIHYDRATE 2 PUFF: 160; 4.5 AEROSOL RESPIRATORY (INHALATION) at 20:10

## 2024-03-29 RX ADMIN — IBUPROFEN 400 MG: 400 TABLET, FILM COATED ORAL at 08:38

## 2024-03-29 RX ADMIN — ONDANSETRON 4 MG: 4 TABLET, ORALLY DISINTEGRATING ORAL at 08:38

## 2024-03-29 RX ADMIN — DULOXETINE HYDROCHLORIDE 60 MG: 60 CAPSULE, DELAYED RELEASE ORAL at 08:38

## 2024-03-29 RX ADMIN — PRAZOSIN HYDROCHLORIDE 2 MG: 1 CAPSULE ORAL at 21:45

## 2024-03-29 RX ADMIN — Medication 1 MG: at 08:38

## 2024-03-29 RX ADMIN — Medication 1 TABLET: at 08:38

## 2024-03-29 RX ADMIN — LISINOPRIL 20 MG: 10 TABLET ORAL at 08:38

## 2024-03-29 RX ADMIN — TIOTROPIUM BROMIDE INHALATION SPRAY 2 PUFF: 3.12 SPRAY, METERED RESPIRATORY (INHALATION) at 12:00

## 2024-03-29 RX ADMIN — BUSPIRONE HYDROCHLORIDE 10 MG: 10 TABLET ORAL at 08:41

## 2024-03-29 RX ADMIN — BUDESONIDE AND FORMOTEROL FUMARATE DIHYDRATE 2 PUFF: 160; 4.5 AEROSOL RESPIRATORY (INHALATION) at 12:00

## 2024-03-29 RX ADMIN — QUETIAPINE FUMARATE 50 MG: 100 TABLET ORAL at 21:44

## 2024-03-29 RX ADMIN — BUSPIRONE HYDROCHLORIDE 10 MG: 10 TABLET ORAL at 21:45

## 2024-03-29 RX ADMIN — HYDROXYZINE HYDROCHLORIDE 50 MG: 50 TABLET ORAL at 08:38

## 2024-03-29 RX ADMIN — CARIPRAZINE 1.5 MG: 1.5 CAPSULE, GELATIN COATED ORAL at 08:38

## 2024-03-29 RX ADMIN — MAGNESIUM GLUCONATE 500 MG ORAL TABLET 400 MG: 500 TABLET ORAL at 08:38

## 2024-03-29 RX ADMIN — QUETIAPINE FUMARATE 50 MG: 100 TABLET ORAL at 15:35

## 2024-03-29 RX ADMIN — Medication 100 MG: at 08:38

## 2024-03-29 RX ADMIN — AMITRIPTYLINE HYDROCHLORIDE 50 MG: 25 TABLET, FILM COATED ORAL at 21:44

## 2024-03-29 NOTE — NURSING NOTE
Spoke to Dr. Aiken via phone. Intake information provided. Instructed to admit the patient. Admit orders received. SP4 routine orders, Observation status [id number]. Patient and ED provider made aware of plan of care. Safety precautions maintained.

## 2024-03-29 NOTE — H&P
"      INITIAL PSYCHIATRIC HISTORY & PHYSICAL    Patient Identification:  Name:  Leonard Crandall  Age:  53 y.o.  Sex:  male  :  1970  MRN:  [id number]   Visit Number:  [id number]  Primary Care Physician:  Jhon Khoury PA-C    SUBJECTIVE    CC/Focus of Exam: alcohol use    HPI: Leonard Crandall is a 53 y.o. male who was admitted under observation status on 3/28/2024 with complaints of alcohol use and withdrawals. The patient reports a long history of substance use. First use was at age 13. Over time the use increased and the patient  continued to use despite negative consequences including relationship problems, social and financial problems. The patient endorses symptoms of tolerance and withdrawals and ongoing cravings to use. Has tried to cut down and stop but has not been successful. Spends too much time and resources in pursuit of substance use. Longest period of sobriety is reported to be 100 days.  Currently using about 2 fifths of vodka daily  Last use was day before yesterday.   Withdrawal symptoms include tremors, feeling hot and cold, stomach upset, headaches.     PAST PSYCHIATRIC HX: Patient reports he has been treated for depression and anxiety in the past.      SUBSTANCE USE HX: See HPI.     SOCIAL HX:   Social History     Socioeconomic History    Marital status: Single   Tobacco Use    Smoking status: Every Day     Current packs/day: 1.00     Average packs/day: 1 pack/day for 40.0 years (40.0 ttl pk-yrs)     Types: Cigarettes     Start date:     Smokeless tobacco: Former   Vaping Use    Vaping status: Never Used   Substance and Sexual Activity    Alcohol use: Yes     Comment: two 5ths daily x 1 mo    Drug use: No     Comment: Pt denies, \"I don't smoke weed.\"    Sexual activity: Defer         Past Medical History:   Diagnosis Date    Alcoholism 2022    Anxiety     Bipolar disorder     COPD (chronic obstructive pulmonary disease) 2022    Depression     Hypertension     Tobacco " abuse 01/20/2022    Withdrawal symptoms, alcohol         History reviewed. No pertinent surgical history.    Family History   Problem Relation Age of Onset    Diabetes Mother     Acne Mother     Heart attack Father          Medications Prior to Admission   Medication Sig Dispense Refill Last Dose    acetaminophen (TYLENOL) 650 MG 8 hr tablet Take 1 tablet by mouth Every 6 (Six) Hours As Needed for Mild Pain.       albuterol sulfate  (90 Base) MCG/ACT inhaler Inhale 2 puffs Every 4 (Four) Hours As Needed for Wheezing or Shortness of Air. 18 g 1     amitriptyline (ELAVIL) 50 MG tablet Take 1 tablet by mouth Every Night.   Past Week    busPIRone (BUSPAR) 10 MG tablet Take 1 tablet by mouth 2 (Two) Times a Day.   Past Week    Cariprazine HCl (Vraylar) 1.5 MG capsule capsule Take 1 capsule by mouth Daily.   Past Week    DULoxetine (CYMBALTA) 60 MG capsule Take 1 capsule by mouth Daily.   Past Week    Fluticasone-Salmeterol (ADVAIR/WIXELA) 250-50 MCG/ACT DISKUS Inhale 1 puff 2 (Two) Times a Day.   Past Week    folic acid (FOLVITE) 1 MG tablet Take 1 tablet by mouth Daily.   Past Week    lisinopril (PRINIVIL,ZESTRIL) 20 MG tablet Take 1 tablet by mouth Daily.   Past Week    magnesium oxide (MAG-OX) 400 MG tablet Take 1 tablet by mouth Daily.   Past Week    meloxicam (MOBIC) 15 MG tablet Take 1 tablet by mouth Daily.   Past Week    methocarbamol (ROBAXIN) 500 MG tablet Take 1 tablet by mouth 3 (Three) Times a Day As Needed for Muscle Spasms.       naloxone (NARCAN) 4 MG/0.1ML nasal spray 1 spray into the nostril(s) as directed by provider As Needed for Opioid Reversal. Call 911. Don't prime. [place] in 1 nostril for overdose. Repeat in 2-3 minutes in other nostril if no or minimal breathing/responsiveness.       naproxen (NAPROSYN) 500 MG tablet Take 1 tablet by mouth 2 (Two) Times a Day As Needed for Mild Pain or Headache.       pantoprazole (PROTONIX) 40 MG EC tablet Take 1 tablet by mouth Daily.   Past Week     prazosin (MINIPRESS) 2 MG capsule Take 1 capsule by mouth Every Night.   Past Week    QUEtiapine (SEROquel) 50 MG tablet Take 1 tablet by mouth 3 (Three) Times a Day.   Past Week    thiamine (VITAMIN B-1) 100 MG tablet  tablet Take 1 tablet by mouth Daily.   Past Week    Tiotropium Bromide Monohydrate (Spiriva Respimat) 1.25 MCG/ACT aerosol solution inhaler Inhale 2 puffs Daily.   Past Week    multivitamin with minerals tablet tablet Take 1 tablet by mouth Daily.            ALLERGIES:  Fish-derived products    Temp:  [97.6 °F (36.4 °C)-98.3 °F (36.8 °C)] 98.1 °F (36.7 °C)  Heart Rate:  [] 106  Resp:  [16-20] 18  BP: (106-158)/() 108/69    REVIEW OF SYSTEMS:  Review of Systems   Constitutional:  Positive for chills, diaphoresis and fatigue.   HENT: Negative.     Eyes: Negative.    Respiratory: Negative.     Cardiovascular: Negative.    Gastrointestinal:  Positive for abdominal pain and nausea.   Endocrine: Negative.    Genitourinary: Negative.    Musculoskeletal:  Positive for arthralgias.   Skin: Negative.    Allergic/Immunologic: Negative.    Neurological:  Positive for tremors and weakness.   Hematological: Negative.    Psychiatric/Behavioral:  Positive for dysphoric mood. The patient is nervous/anxious.         OBJECTIVE    PHYSICAL EXAM:  Physical Exam  Constitutional:  Appears well-developed and well-nourished.   HENT:   Head: Normocephalic and atraumatic.   Right Ear: External ear normal.   Left Ear: External ear normal.   Mouth/Throat: Oropharynx is clear and moist.   Eyes: Pupils are equal, round, and reactive to light. Conjunctivae and EOM are normal.   Neck: Normal range of motion. Neck supple.   Cardiovascular: Normal rate, regular rhythm and normal heart sounds.    Respiratory: Effort normal and breath sounds normal. No respiratory distress. No wheezes.   GI: Soft. Bowel sounds are normal.No distension. There is no tenderness.   Musculoskeletal: Normal range of motion. No edema or deformity.    Neurological:No cranial nerve deficit. Coordination normal.   Skin: Skin is warm and dry. No rash noted. No erythema.     MENTAL STATUS EXAM:   Hygiene:   fair  Cooperation:  Cooperative  Eye Contact:  Fair  Psychomotor Behavior:  Appropriate  Affect:  Appropriate  Hopelessness: Denies  Speech:  Normal  Thought Process: Goal directed  Thought Content:  Normal  Suicidal:  None  Homicidal:  None  Hallucinations:  None  Delusion:  None  Memory:  Intact  Orientation:  Person, Place, Time and Situation  Reliability:  fair  Insight:  Fair  Judgement:  Fair  Impulse Control:  Fair    Imaging Results (Last 24 Hours)       ** No results found for the last 24 hours. **             ECG/EMG Results (most recent)       None             Lab Results   Component Value Date    GLUCOSE 116 (H) 03/28/2024    BUN 9 03/28/2024    CREATININE 0.94 03/28/2024    EGFRIFNONA >60 07/22/2022    EGFRIFAFRI >60 07/22/2022    BCR 9.6 03/28/2024    CO2 22.5 03/28/2024    CALCIUM 9.0 03/28/2024    ALBUMIN 4.4 03/28/2024    AST 35 03/28/2024    ALT 36 03/28/2024       Lab Results   Component Value Date    WBC 10.60 03/28/2024    HGB 16.4 03/28/2024    HCT 48.7 03/28/2024    MCV 94.9 03/28/2024     03/28/2024       Last Urine Toxicity  More data may exist         Latest Ref Rng & Units 3/28/2024 1/21/2022   LAST URINE TOXICITY RESULTS   Amphetamine, Urine Qual Negative Negative  Negative    Barbiturates Screen, Urine Negative Negative  Negative    Benzodiazepine Screen, Urine Negative Negative  Negative    Buprenorphine, Screen, Urine Negative Negative  Negative    Cocaine Screen, Urine Negative Negative  Negative    Fentanyl, Urine Negative Negative  -   Methadone Screen , Urine Negative Negative  Negative    Methamphetamine, Ur Negative Negative  Negative        Brief Urine Lab Results  (Last result in the past 365 days)        Color   Clarity   Blood   Leuk Est   Nitrite   Protein   CREAT   Urine HCG        03/28/24 1812 Dark Yellow    Clear   Negative   Trace   Negative   100 mg/dL (2+)                     ASSESSMENT & PLAN:    Hospital bed: No      Alcohol use disorder, severe, dependence  -Monitor for withdrawals, patient is admitted under observation status but given the amount he is using he will likely need a detox regimen.       Nicotine use disorder  -Encourage cessation      COPD (chronic obstructive pulmonary disease)  -Symbicort  -Spiriva       Other recurrent depressive disorders  -Duloxetine  -Cariprazine  -Quetiapine  -Amitriptyline      Anxiety disorder unspecified  -Buspirone       HTN  -Lisinopril      GERD  -Protonix    The patient has been admitted under observation status for safety and stabilization.  Patient will be monitored for withdrawals and maintained on Special Precautions Level 4 (q30 min checks).  The patient will have individual and group therapy with a master's level therapist. A master treatment plan will be developed and agreed upon by the patient and his/her treatment team.  The patient's estimated length of stay in the hospital is 3-5 days.

## 2024-03-29 NOTE — NURSING NOTE
LMOM for pt to  docusate sodium  [facility] RN contacted at this time for chart review and request of bed assignment. Bed assigned to 41A.

## 2024-03-29 NOTE — PLAN OF CARE
"Goal Outcome Evaluation:  Plan of Care Reviewed With: patient  Patient Agreement with Plan of Care: agrees     Progress: no change  Outcome Evaluation: Patient states, He was brought in by [facility] for alcohol detox. Patient report he has been drinking 2 fifths of vodka daily for a month. Patient reports before that he was \"just drinking beer.\" Patient reports being incarcerated for 16 yrs for robbery, escape, and \"something else\". Patient also reports charges for assaulting an officer. Patient has been living with a friend for the last month. Patient denies drug use, however he was positive for THC. Patient rates anxiety and depression 8/10. Cravings 10/10. C/o tremors, stomach cramps, and body aches. Denies SI/HI/AVH. CIWA 7. Sleep and appetite-poor.                               "

## 2024-03-29 NOTE — PROGRESS NOTES
Navigator is helping with the following referral:    [facility] - [phone]  -Spoke with Elizabeth. They will accept patient at discharge.  3/29

## 2024-03-29 NOTE — DISCHARGE INSTR - APPOINTMENTS
[facility]  [address], [place] [address]   [phone]    Admit at discharge

## 2024-03-29 NOTE — PLAN OF CARE
Goal Outcome Evaluation:  Plan of Care Reviewed With: (P) patient  Patient Agreement with Plan of Care: (P) agrees     Progress: (P) improving  Outcome Evaluation: (P) Patient has been cooperative this shift with staff. Patient has spent most of shift in his room. Patient reports poor sleep and poor appetite. Patient reports anxiety 8/10 and depression 7/10. Rates [id number]/10. Patient denies SI/HI/AVH. Pt remains in observation status at this time.  Pt has had no complaints this shift.

## 2024-03-29 NOTE — PLAN OF CARE
Goal Outcome Evaluation:  Plan of Care Reviewed With: patient  Patient Agreement with Plan of Care: agrees     Progress: improving       PT new observation for pm shift. Slept well upon arrival to unit. Pt given Atarax, Tylenol, Zofran,  and Trazodone prn medications.

## 2024-03-29 NOTE — NURSING NOTE
"Intake assessment completed at this time. Pt presents to Intake wishing to detox off alcohol. Pt reports drinking two 5ths of liquor daily for the last month. Pt brought to ED by [facility] staff. Pt reported Hx of bipolar DO, denied any DTs or seizures with withdrawals. Pt reports being incarcerated x 16+ yrs for robbery, escape, and \"something else\". Pt also reports charges for assaulting an officer. Pt has been living with people for the last month, did not explain relationship with said people. Pt denies any Outpt Psychiatric care or drug use. Unsure how THC got in his system.     Anxiety 8 depression 8 craving 10 on scale of 0-10. Sleep & appetite poor.  Pt denies any SI/HI/AVH, but reports sometimes seeing shadows in peripheral vision.    CIWA 6    Pt A&Ox 3.  "

## 2024-03-29 NOTE — PHARMACY PATIENT ASSISTANCE
Pharmacy checked on cost of  Vraylar from patient's home medication list. According to patient's pharmacy, the copay is $0 for a 1 month supply. Medication was last filled on 2/15/2024. No other issues identified at this time.      Thank you,    Munira Jacob, PharmD  03/29/24  11:38 EDT

## 2024-03-29 NOTE — PLAN OF CARE
Goal Outcome Evaluation:  Plan of Care Reviewed With: patient  Patient Agreement with Plan of Care: agrees  Consent Given to Review Plan with: [facility]  Progress: no change  Outcome Evaluation: Met with patient in the office, completing the social history assessment and reviewing the treatment plans. Completed SDOH as well.      Problem: Adult Behavioral Health Plan of Care  Goal: Plan of Care Review  Outcome: Ongoing, Progressing  Flowsheets (Taken 3/29/2024 0917)  Consent Given to Review Plan with: [facility]udenc[facility]  Progress: no change  Plan of Care Reviewed With: patient  Patient Agreement with Plan of Care: agrees  Outcome Evaluation: Met with patient in the office, completing the social history assessment and reviewing the treatment plans. Completed SDOH as well.     Problem: Adult Behavioral Health Plan of Care  Goal: Patient-Specific Goal (Individualization)  Outcome: Ongoing, Progressing  Flowsheets (Taken 3/29/2024 0917)  Patient Personal Strengths:   resilient   resourceful  Patient-Specific Goals (Include Timeframe): Individual and group therapy  Individualized Care Needs: Aftercare coordination  Anxieties, Fears or Concerns: Worried about relapse  Patient Vulnerabilities:   limited support system   substance abuse/addiction   history of unsuccessful treatment     Problem: Adult Behavioral Health Plan of Care  Goal: Optimized Coping Skills in Response to Life Stressors  Outcome: Ongoing, Progressing  Flowsheets (Taken 3/29/2024 0917)  Optimized Coping Skills in Response to Life Stressors: making progress toward outcome     Problem: Adult Behavioral Health Plan of Care  Goal: Optimized Coping Skills in Response to Life Stressors  Intervention: Promote Effective Coping Strategies  Flowsheets (Taken 3/29/2024 0917)  Supportive Measures:   active listening utilized   problem-solving facilitated   verbalization of feelings encouraged   self-responsibility promoted   positive reinforcement provided      Problem: Adult Behavioral Health Plan of Care  Goal: Develops/Participates in Therapeutic Kirksey to Support Successful Transition  Outcome: Ongoing, Progressing  Flowsheets (Taken 3/29/2024 0917)  Develops/Participates in Therapeutic Kirksey to Support Successful Transition: making progress toward outcome     Problem: Adult Behavioral Health Plan of Care  Goal: Develops/Participates in Therapeutic Kirksey to Support Successful Transition  Intervention: Foster Therapeutic Kirksey  Flowsheets (Taken 3/29/2024 0917)  Trust Relationship/Rapport:   care explained   thoughts/feelings acknowledged   emotional support provided   empathic listening provided   choices provided   questions answered   questions encouraged   reassurance provided     Problem: Adult Behavioral Health Plan of Care  Goal: Develops/Participates in Therapeutic Kirksey to Support Successful Transition  Intervention: Mutually Develop Transition Plan  Flowsheets (Taken 3/29/2024 0917)  Outpatient/Agency/Support Group Needs:   12 step program (specify)   residential services   support group(s) (specify)  Discharge Coordination/Progress: [facility]  Transition Support: follow-up care discussed  Transportation Anticipated: agency  Anticipated Discharge Disposition: residential substance use unit  Transportation Concerns: none  Current Discharge Risk: substance use/abuse  Concerns to be Addressed:   substance/tobacco abuse/use   mental health   grief and loss  Readmission Within the Last 30 Days: no previous admission in last 30 days  Patient/Family Anticipated Services at Transition: rehabilitation services  Patient's Choice of Community Agency(s): [facility]  Patient/Family Anticipates Transition to: inpatient rehabilitation facility  Offered/Gave Vendor List: no     [id number]  D: Patient is a 53-year-old  male currently residing in [place], [place]. He has been living with a friend for about two months now. 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 Alcohol was making it increasingly difficult to get to work. Patient has his GED. He is here for alcohol detoxification and plans to return to [facility] for residential treatment upon discharge. He has been to [facility] twice but has left early both times. He hoped recovery would be different this time.     A: Patient's affect appeared dysthymic. His mood appeared depressed and mildly irritable. He was polite and moderately cooperative. Patient's thought process was linear and goal directed. His speech was of regular rate and rhythm. His motivation to change seemed good, as he already had plans for residential treatment.     P: Patient has been placed on a detox regimen. He will be monitored routinely for his safety. He will follow up with [facility] directly upon discharge.

## 2024-03-30 PROCEDURE — 94760 N-INVAS EAR/PLS OXIMETRY 1: CPT

## 2024-03-30 PROCEDURE — 99232 SBSQ HOSP IP/OBS MODERATE 35: CPT | Performed by: PSYCHIATRY & NEUROLOGY

## 2024-03-30 PROCEDURE — 94664 DEMO&/EVAL PT USE INHALER: CPT

## 2024-03-30 PROCEDURE — 94799 UNLISTED PULMONARY SVC/PX: CPT

## 2024-03-30 PROCEDURE — HZ2ZZZZ DETOXIFICATION SERVICES FOR SUBSTANCE ABUSE TREATMENT: ICD-10-PCS | Performed by: PSYCHIATRY & NEUROLOGY

## 2024-03-30 RX ORDER — LORAZEPAM 2 MG/1
2 TABLET ORAL
Status: DISPENSED | OUTPATIENT
Start: 2024-03-30 | End: 2024-03-31

## 2024-03-30 RX ORDER — LORAZEPAM 2 MG/1
2 TABLET ORAL
Status: COMPLETED | OUTPATIENT
Start: 2024-03-31 | End: 2024-03-31

## 2024-03-30 RX ORDER — LORAZEPAM 1 MG/1
1 TABLET ORAL EVERY 4 HOURS PRN
Status: DISCONTINUED | OUTPATIENT
Start: 2024-04-02 | End: 2024-04-01 | Stop reason: HOSPADM

## 2024-03-30 RX ORDER — LORAZEPAM 2 MG/1
2 TABLET ORAL EVERY 4 HOURS PRN
Status: DISCONTINUED | OUTPATIENT
Start: 2024-03-31 | End: 2024-04-01 | Stop reason: HOSPADM

## 2024-03-30 RX ORDER — LORAZEPAM 0.5 MG/1
0.5 TABLET ORAL
Status: DISCONTINUED | OUTPATIENT
Start: 2024-04-03 | End: 2024-04-01 | Stop reason: HOSPADM

## 2024-03-30 RX ORDER — LORAZEPAM 1 MG/1
1 TABLET ORAL
Status: DISCONTINUED | OUTPATIENT
Start: 2024-04-02 | End: 2024-04-01 | Stop reason: HOSPADM

## 2024-03-30 RX ORDER — LORAZEPAM 0.5 MG/1
0.5 TABLET ORAL EVERY 4 HOURS PRN
Status: DISCONTINUED | OUTPATIENT
Start: 2024-04-03 | End: 2024-04-01 | Stop reason: HOSPADM

## 2024-03-30 RX ADMIN — Medication 100 MG: at 08:33

## 2024-03-30 RX ADMIN — LORAZEPAM 2 MG: 2 TABLET ORAL at 16:30

## 2024-03-30 RX ADMIN — Medication 1 MG: at 08:33

## 2024-03-30 RX ADMIN — TIOTROPIUM BROMIDE INHALATION SPRAY 2 PUFF: 3.12 SPRAY, METERED RESPIRATORY (INHALATION) at 07:29

## 2024-03-30 RX ADMIN — DULOXETINE HYDROCHLORIDE 60 MG: 60 CAPSULE, DELAYED RELEASE ORAL at 08:33

## 2024-03-30 RX ADMIN — PRAZOSIN HYDROCHLORIDE 2 MG: 1 CAPSULE ORAL at 21:33

## 2024-03-30 RX ADMIN — QUETIAPINE FUMARATE 50 MG: 100 TABLET ORAL at 15:01

## 2024-03-30 RX ADMIN — MAGNESIUM GLUCONATE 500 MG ORAL TABLET 400 MG: 500 TABLET ORAL at 08:33

## 2024-03-30 RX ADMIN — QUETIAPINE FUMARATE 50 MG: 100 TABLET ORAL at 21:33

## 2024-03-30 RX ADMIN — BUSPIRONE HYDROCHLORIDE 10 MG: 10 TABLET ORAL at 08:33

## 2024-03-30 RX ADMIN — BUSPIRONE HYDROCHLORIDE 10 MG: 10 TABLET ORAL at 21:33

## 2024-03-30 RX ADMIN — BUDESONIDE AND FORMOTEROL FUMARATE DIHYDRATE 2 PUFF: 160; 4.5 AEROSOL RESPIRATORY (INHALATION) at 21:24

## 2024-03-30 RX ADMIN — ACETAMINOPHEN 650 MG: 325 TABLET ORAL at 10:24

## 2024-03-30 RX ADMIN — QUETIAPINE FUMARATE 50 MG: 100 TABLET ORAL at 08:33

## 2024-03-30 RX ADMIN — LORAZEPAM 2 MG: 2 TABLET ORAL at 21:33

## 2024-03-30 RX ADMIN — LORAZEPAM 2 MG: 2 TABLET ORAL at 10:24

## 2024-03-30 RX ADMIN — Medication 1 TABLET: at 08:33

## 2024-03-30 RX ADMIN — Medication 2 TABLET: at 08:33

## 2024-03-30 RX ADMIN — BUDESONIDE AND FORMOTEROL FUMARATE DIHYDRATE 2 PUFF: 160; 4.5 AEROSOL RESPIRATORY (INHALATION) at 07:29

## 2024-03-30 RX ADMIN — HYDROXYZINE HYDROCHLORIDE 50 MG: 50 TABLET ORAL at 10:24

## 2024-03-30 RX ADMIN — PANTOPRAZOLE SODIUM 40 MG: 40 TABLET, DELAYED RELEASE ORAL at 08:33

## 2024-03-30 RX ADMIN — LISINOPRIL 20 MG: 10 TABLET ORAL at 08:33

## 2024-03-30 RX ADMIN — CARIPRAZINE 1.5 MG: 1.5 CAPSULE, GELATIN COATED ORAL at 08:32

## 2024-03-30 RX ADMIN — AMITRIPTYLINE HYDROCHLORIDE 50 MG: 25 TABLET, FILM COATED ORAL at 21:33

## 2024-03-30 NOTE — PLAN OF CARE
Goal Outcome Evaluation:                      Pt has been calm and cooperative this shift. Pt has continued to isolate to his room most of the shift. Pt rates anxiety 6/10 and depression 3/10. Pt denies SI/HI/AVH. Pt reports better sleep and fair appetite. Pt has had no complaints this shift.

## 2024-03-30 NOTE — PLAN OF CARE
Goal Outcome Evaluation:  Plan of Care Reviewed With: patient  Patient Agreement with Plan of Care: agrees        Outcome Evaluation: Rated anxiety and depression 6/10. Denied SI, HI, AVH. Rated cravings 9/10. Reported withdrawal symptoms of tremors, sweating, stomach pain, headache. Remains in observation status.

## 2024-03-31 ENCOUNTER — APPOINTMENT (OUTPATIENT)
Dept: GENERAL RADIOLOGY | Facility: HOSPITAL | Age: 54
DRG: 897 | End: 2024-03-31
Payer: MEDICAID

## 2024-03-31 PROCEDURE — 73030 X-RAY EXAM OF SHOULDER: CPT | Performed by: RADIOLOGY

## 2024-03-31 PROCEDURE — 94799 UNLISTED PULMONARY SVC/PX: CPT

## 2024-03-31 PROCEDURE — 99232 SBSQ HOSP IP/OBS MODERATE 35: CPT | Performed by: PSYCHIATRY & NEUROLOGY

## 2024-03-31 PROCEDURE — 73030 X-RAY EXAM OF SHOULDER: CPT

## 2024-03-31 PROCEDURE — 94760 N-INVAS EAR/PLS OXIMETRY 1: CPT

## 2024-03-31 PROCEDURE — 94664 DEMO&/EVAL PT USE INHALER: CPT

## 2024-03-31 RX ORDER — LORAZEPAM 2 MG/1
2 TABLET ORAL
Status: DISCONTINUED | OUTPATIENT
Start: 2024-03-31 | End: 2024-04-01

## 2024-03-31 RX ORDER — LORAZEPAM 2 MG/1
2 TABLET ORAL
Status: DISCONTINUED | OUTPATIENT
Start: 2024-03-31 | End: 2024-03-31

## 2024-03-31 RX ORDER — QUETIAPINE FUMARATE 100 MG/1
50 TABLET, FILM COATED ORAL ONCE
Status: COMPLETED | OUTPATIENT
Start: 2024-04-01 | End: 2024-03-31

## 2024-03-31 RX ADMIN — Medication 2 TABLET: at 08:30

## 2024-03-31 RX ADMIN — METHOCARBAMOL 500 MG: 500 TABLET, FILM COATED ORAL at 08:29

## 2024-03-31 RX ADMIN — LORAZEPAM 2 MG: 2 TABLET ORAL at 06:50

## 2024-03-31 RX ADMIN — HYDROXYZINE HYDROCHLORIDE 50 MG: 50 TABLET ORAL at 21:07

## 2024-03-31 RX ADMIN — LORAZEPAM 2 MG: 2 TABLET ORAL at 02:02

## 2024-03-31 RX ADMIN — Medication 1 MG: at 08:29

## 2024-03-31 RX ADMIN — DULOXETINE HYDROCHLORIDE 60 MG: 60 CAPSULE, DELAYED RELEASE ORAL at 08:28

## 2024-03-31 RX ADMIN — LORAZEPAM 2 MG: 2 TABLET ORAL at 04:26

## 2024-03-31 RX ADMIN — LORAZEPAM 2 MG: 2 TABLET ORAL at 14:18

## 2024-03-31 RX ADMIN — BUDESONIDE AND FORMOTEROL FUMARATE DIHYDRATE 2 PUFF: 160; 4.5 AEROSOL RESPIRATORY (INHALATION) at 19:48

## 2024-03-31 RX ADMIN — Medication 1 TABLET: at 08:29

## 2024-03-31 RX ADMIN — METHOCARBAMOL 500 MG: 500 TABLET, FILM COATED ORAL at 21:07

## 2024-03-31 RX ADMIN — PANTOPRAZOLE SODIUM 40 MG: 40 TABLET, DELAYED RELEASE ORAL at 08:29

## 2024-03-31 RX ADMIN — BUSPIRONE HYDROCHLORIDE 10 MG: 10 TABLET ORAL at 08:30

## 2024-03-31 RX ADMIN — BUDESONIDE AND FORMOTEROL FUMARATE DIHYDRATE 2 PUFF: 160; 4.5 AEROSOL RESPIRATORY (INHALATION) at 08:18

## 2024-03-31 RX ADMIN — PRAZOSIN HYDROCHLORIDE 2 MG: 1 CAPSULE ORAL at 21:07

## 2024-03-31 RX ADMIN — QUETIAPINE FUMARATE 50 MG: 100 TABLET ORAL at 08:33

## 2024-03-31 RX ADMIN — LORAZEPAM 2 MG: 2 TABLET ORAL at 21:07

## 2024-03-31 RX ADMIN — QUETIAPINE FUMARATE 50 MG: 100 TABLET ORAL at 21:07

## 2024-03-31 RX ADMIN — QUETIAPINE FUMARATE 50 MG: 100 TABLET ORAL at 17:51

## 2024-03-31 RX ADMIN — HYDROXYZINE HYDROCHLORIDE 50 MG: 50 TABLET ORAL at 02:02

## 2024-03-31 RX ADMIN — QUETIAPINE FUMARATE 50 MG: 100 TABLET ORAL at 23:39

## 2024-03-31 RX ADMIN — LORAZEPAM 2 MG: 2 TABLET ORAL at 23:39

## 2024-03-31 RX ADMIN — TRAZODONE HYDROCHLORIDE 50 MG: 50 TABLET ORAL at 21:07

## 2024-03-31 RX ADMIN — MAGNESIUM GLUCONATE 500 MG ORAL TABLET 400 MG: 500 TABLET ORAL at 08:29

## 2024-03-31 RX ADMIN — CARIPRAZINE 1.5 MG: 1.5 CAPSULE, GELATIN COATED ORAL at 08:32

## 2024-03-31 RX ADMIN — LORAZEPAM 2 MG: 2 TABLET ORAL at 22:27

## 2024-03-31 RX ADMIN — TIOTROPIUM BROMIDE INHALATION SPRAY 2 PUFF: 3.12 SPRAY, METERED RESPIRATORY (INHALATION) at 08:18

## 2024-03-31 RX ADMIN — AMITRIPTYLINE HYDROCHLORIDE 50 MG: 25 TABLET, FILM COATED ORAL at 21:07

## 2024-03-31 RX ADMIN — Medication 100 MG: at 08:33

## 2024-03-31 RX ADMIN — BUSPIRONE HYDROCHLORIDE 10 MG: 10 TABLET ORAL at 21:07

## 2024-03-31 RX ADMIN — LISINOPRIL 20 MG: 10 TABLET ORAL at 08:29

## 2024-03-31 RX ADMIN — LORAZEPAM 2 MG: 2 TABLET ORAL at 08:29

## 2024-03-31 NOTE — PLAN OF CARE
"Goal Outcome Evaluation:  Plan of Care Reviewed With: patient  Patient Agreement with Plan of Care: agrees     Progress: no change  Outcome Evaluation: Pt restless and irritable, pacing the [place]. Repeatedly asks for belongings and asking to smoke. Pt reminded several times of unit rules and smoke times. Pt reports anxiety, nausea and states \"I'm just pissed off with all these rules\". Denies SI/HI/AVH. A&O. No distress noted.                               "

## 2024-03-31 NOTE — PLAN OF CARE
Goal Outcome Evaluation:  Plan of Care Reviewed With: patient  Patient Agreement with Plan of Care: agrees      Pt rated anxiety 4/10, depression 4/10, and cravings 8/10. Pt denies SI/HI. Pt denies any hallucinations, however pt appeared to have been talking to himself at times. At the beginning of shift pt was oriented x 3, however throughout the night pt appeared confusion about time and situation. Pt has been very restless and agitated tonight, coming in and out of his room. Pt has had to be redirected several times to not wander the hallways. Pt verbalized that he was upset that he cannot smoke throughout the night. Pt was given prn atarax and ativan at 2133, 0202, and 0426. Pt had refused trazodone to help him sleep tonight. Pt appeared to have sleep around 3 hours tonight.

## 2024-03-31 NOTE — PROGRESS NOTES
"INPATIENT PSYCHIATRIC PROGRESS NOTE    Name:  Leonard Crandall  :  1970  MRN:  [id number]  Visit Number:  [id number]  Length of stay:  1    SUBJECTIVE    CC/Focus of Exam: alcohol use    INTERVAL HISTORY:  The patient has been confused last pm and today has difficulty recalling the date. He also reports he feels dizzy and sometimes he is seeing shadows in the peripheral vision.   He states he wants to leave because his son needs help but he agreed to stay.   Depression rating 10  Anxiety rating 3/10  Sleep: 3 hours last night  Withdrawal sx:dizziness, weakness, tremors, chills, sweating  Cravin/10    Review of Systems   Constitutional:  Positive for chills, diaphoresis and fatigue.   Musculoskeletal:  Positive for arthralgias.   Neurological:  Positive for tremors and weakness.   Psychiatric/Behavioral:  Positive for confusion.        OBJECTIVE    Temp:  [97.5 °F (36.4 °C)-98.3 °F (36.8 °C)] 98.3 °F (36.8 °C)  Heart Rate:  [] 88  Resp:  [18-20] 20  BP: (110-160)/(62-93) 160/93    MENTAL STATUS EXAM:  Appearance:Casually dressed, good hygeine.   Cooperation:Cooperative  Psychomotor: No psychomotor agitation/retardation, No EPS, No motor tics  Speech-normal rate, amount.  Mood \"anxious\"   Affect-congruent, appropriate, stable  Thought Content-goal directed, no delusional material present  Thought process-linear, organized.  Suicidality: No SI  Homicidality: No HI  Perception: No AH/VH  Insight-fair   Judgement-fair    Lab Results (last 24 hours)       ** No results found for the last 24 hours. **               Imaging Results (Last 24 Hours)       ** No results found for the last 24 hours. **               ECG/EMG Results (most recent)       None             ALLERGIES: Fish-derived products    Medication Review:   Scheduled Medications:  amitriptyline, 50 mg, Oral, Nightly  B-complex with vitamin C, 2 tablet, Oral, Daily  budesonide-formoterol, 2 puff, Inhalation, BID - RT  busPIRone, 10 mg, Oral, " BID  Cariprazine HCl, 1.5 mg, Oral, Daily  DULoxetine, 60 mg, Oral, Daily  folic acid, 1 mg, Oral, Daily  lisinopril, 20 mg, Oral, Daily  LORazepam, 2 mg, Oral, 3 times per day   Followed by  [START ON 4/1/2024] LORazepam, 1.5 mg, Oral, 3 times per day   Followed by  [START ON 4/2/2024] LORazepam, 1 mg, Oral, 3 times per day   Followed by  [START ON 4/3/2024] LORazepam, 0.5 mg, Oral, 3 times per day  magnesium oxide, 400 mg, Oral, Daily  multivitamin with minerals, 1 tablet, Oral, Daily  nicotine, 1 patch, Transdermal, Q24H  pantoprazole, 40 mg, Oral, Daily  prazosin, 2 mg, Oral, Nightly  QUEtiapine, 50 mg, Oral, TID  thiamine, 100 mg, Oral, Daily  tiotropium bromide monohydrate, 2 puff, Inhalation, Daily - RT         PRN Medications:    acetaminophen    albuterol    aluminum-magnesium hydroxide-simethicone    benzonatate    benztropine **OR** benztropine    famotidine    hydrOXYzine    ibuprofen    loperamide    LORazepam **FOLLOWED BY** [START ON 4/1/2024] LORazepam **FOLLOWED BY** [START ON 4/2/2024] LORazepam **FOLLOWED BY** [START ON 4/3/2024] LORazepam    magnesium hydroxide    methocarbamol    ondansetron ODT    sodium chloride    traZODone   All medications reviewed.    ASSESSMENT & PLAN:      Alcohol use disorder, severe, dependence  -Continue Ativan detox       Nicotine use disorder  -Encourage cessation       COPD (chronic obstructive pulmonary disease)  -Symbicort  -Spiriva        Other recurrent depressive disorders  -Duloxetine  -Cariprazine  -Quetiapine  -Amitriptyline       Anxiety disorder unspecified  -Buspirone        HTN  -Lisinopril       GERD  -Protonix      Right shoulder pain  -Patient reports he fell about six days ago and hurt his right shoulder. Difficult to lift his right arm up.   -X-Ray right shoulder      Special precautions: Special Precautions Level 4 (q30 min checks).    Behavioral Health Treatment Plan and Problem List: I have reviewed and approved the Behavioral Health Treatment  Plan and Problem list.  The patient has had a chance to review and agrees with the treatment plan.    Copied text in portions of this note has been reviewed and is accurate as of 03/31/24         Clinician:  Tj Smiley MD  03/31/24  11:31 EDT

## 2024-04-01 ENCOUNTER — HOSPITAL ENCOUNTER (INPATIENT)
Facility: HOSPITAL | Age: 54
LOS: 1 days | Discharge: PSYCHIATRIC OR SUBSTANCE USE FACILITY | End: 2024-04-02
Attending: INTERNAL MEDICINE | Admitting: INTERNAL MEDICINE
Payer: MEDICAID

## 2024-04-01 VITALS
WEIGHT: 254.2 LBS | OXYGEN SATURATION: 97 % | HEIGHT: 74 IN | SYSTOLIC BLOOD PRESSURE: 159 MMHG | HEART RATE: 121 BPM | DIASTOLIC BLOOD PRESSURE: 76 MMHG | RESPIRATION RATE: 20 BRPM | TEMPERATURE: 98.4 F | BODY MASS INDEX: 32.62 KG/M2

## 2024-04-01 DIAGNOSIS — F10.931 ALCOHOL WITHDRAWAL SYNDROME, WITH DELIRIUM: Primary | ICD-10-CM

## 2024-04-01 PROBLEM — F10.939 ALCOHOL WITHDRAWAL: Status: ACTIVE | Noted: 2024-04-01

## 2024-04-01 LAB
ALBUMIN SERPL-MCNC: 3.7 G/DL (ref 3.5–5.2)
ALBUMIN/GLOB SERPL: 1.5 G/DL
ALP SERPL-CCNC: 77 U/L (ref 39–117)
ALT SERPL W P-5'-P-CCNC: 21 U/L (ref 1–41)
ANION GAP SERPL CALCULATED.3IONS-SCNC: 8.9 MMOL/L (ref 5–15)
AST SERPL-CCNC: 23 U/L (ref 1–40)
BASOPHILS # BLD AUTO: 0.05 10*3/MM3 (ref 0–0.2)
BASOPHILS NFR BLD AUTO: 0.7 % (ref 0–1.5)
BILIRUB SERPL-MCNC: 0.2 MG/DL (ref 0–1.2)
BUN SERPL-MCNC: 11 MG/DL (ref 6–20)
BUN/CREAT SERPL: 12.4 (ref 7–25)
CALCIUM SPEC-SCNC: 9 MG/DL (ref 8.6–10.5)
CHLORIDE SERPL-SCNC: 104 MMOL/L (ref 98–107)
CO2 SERPL-SCNC: 22.1 MMOL/L (ref 22–29)
CREAT SERPL-MCNC: 0.89 MG/DL (ref 0.76–1.27)
DEPRECATED RDW RBC AUTO: 48.3 FL (ref 37–54)
EGFRCR SERPLBLD CKD-EPI 2021: 103.8 ML/MIN/1.73
EOSINOPHIL # BLD AUTO: 0.4 10*3/MM3 (ref 0–0.4)
EOSINOPHIL NFR BLD AUTO: 5.5 % (ref 0.3–6.2)
ERYTHROCYTE [DISTWIDTH] IN BLOOD BY AUTOMATED COUNT: 13.6 % (ref 12.3–15.4)
GLOBULIN UR ELPH-MCNC: 2.4 GM/DL
GLUCOSE SERPL-MCNC: 125 MG/DL (ref 65–99)
HCT VFR BLD AUTO: 42 % (ref 37.5–51)
HGB BLD-MCNC: 14.1 G/DL (ref 13–17.7)
IMM GRANULOCYTES # BLD AUTO: 0.02 10*3/MM3 (ref 0–0.05)
IMM GRANULOCYTES NFR BLD AUTO: 0.3 % (ref 0–0.5)
LYMPHOCYTES # BLD AUTO: 2.35 10*3/MM3 (ref 0.7–3.1)
LYMPHOCYTES NFR BLD AUTO: 32.5 % (ref 19.6–45.3)
MCH RBC QN AUTO: 32.5 PG (ref 26.6–33)
MCHC RBC AUTO-ENTMCNC: 33.6 G/DL (ref 31.5–35.7)
MCV RBC AUTO: 96.8 FL (ref 79–97)
MONOCYTES # BLD AUTO: 0.7 10*3/MM3 (ref 0.1–0.9)
MONOCYTES NFR BLD AUTO: 9.7 % (ref 5–12)
NEUTROPHILS NFR BLD AUTO: 3.71 10*3/MM3 (ref 1.7–7)
NEUTROPHILS NFR BLD AUTO: 51.3 % (ref 42.7–76)
NRBC BLD AUTO-RTO: 0 /100 WBC (ref 0–0.2)
PLATELET # BLD AUTO: 226 10*3/MM3 (ref 140–450)
PMV BLD AUTO: 9.5 FL (ref 6–12)
POTASSIUM SERPL-SCNC: 4.1 MMOL/L (ref 3.5–5.2)
PROT SERPL-MCNC: 6.1 G/DL (ref 6–8.5)
RBC # BLD AUTO: 4.34 10*6/MM3 (ref 4.14–5.8)
SODIUM SERPL-SCNC: 135 MMOL/L (ref 136–145)
WBC NRBC COR # BLD AUTO: 7.23 10*3/MM3 (ref 3.4–10.8)

## 2024-04-01 PROCEDURE — 25010000002 LORAZEPAM PER 2 MG: Performed by: PSYCHIATRY & NEUROLOGY

## 2024-04-01 PROCEDURE — 25010000002 HEPARIN (PORCINE) PER 1000 UNITS: Performed by: INTERNAL MEDICINE

## 2024-04-01 PROCEDURE — 94799 UNLISTED PULMONARY SVC/PX: CPT

## 2024-04-01 PROCEDURE — 94761 N-INVAS EAR/PLS OXIMETRY MLT: CPT

## 2024-04-01 PROCEDURE — 25810000003 SODIUM CHLORIDE 0.9 % SOLUTION 1,000 ML FLEX CONT: Performed by: INTERNAL MEDICINE

## 2024-04-01 PROCEDURE — 25010000002 THIAMINE HCL 200 MG/2ML SOLUTION 2 ML VIAL: Performed by: INTERNAL MEDICINE

## 2024-04-01 PROCEDURE — 25010000002 LORAZEPAM PER 2 MG: Performed by: INTERNAL MEDICINE

## 2024-04-01 PROCEDURE — 94664 DEMO&/EVAL PT USE INHALER: CPT

## 2024-04-01 PROCEDURE — 99223 1ST HOSP IP/OBS HIGH 75: CPT | Performed by: INTERNAL MEDICINE

## 2024-04-01 PROCEDURE — 99239 HOSP IP/OBS DSCHRG MGMT >30: CPT | Performed by: PSYCHIATRY & NEUROLOGY

## 2024-04-01 PROCEDURE — 94640 AIRWAY INHALATION TREATMENT: CPT

## 2024-04-01 PROCEDURE — 80053 COMPREHEN METABOLIC PANEL: CPT | Performed by: PSYCHIATRY & NEUROLOGY

## 2024-04-01 PROCEDURE — 85025 COMPLETE CBC W/AUTO DIFF WBC: CPT | Performed by: PSYCHIATRY & NEUROLOGY

## 2024-04-01 RX ORDER — LISINOPRIL 10 MG/1
10 TABLET ORAL
Status: DISCONTINUED | OUTPATIENT
Start: 2024-04-01 | End: 2024-04-02

## 2024-04-01 RX ORDER — AMITRIPTYLINE HYDROCHLORIDE 50 MG/1
50 TABLET, FILM COATED ORAL NIGHTLY
Status: CANCELLED | OUTPATIENT
Start: 2024-04-01

## 2024-04-01 RX ORDER — PANTOPRAZOLE SODIUM 40 MG/1
40 TABLET, DELAYED RELEASE ORAL
Status: DISCONTINUED | OUTPATIENT
Start: 2024-04-02 | End: 2024-04-02 | Stop reason: HOSPADM

## 2024-04-01 RX ORDER — MELOXICAM 15 MG/1
15 TABLET ORAL DAILY
COMMUNITY
End: 2024-04-02 | Stop reason: HOSPADM

## 2024-04-01 RX ORDER — FOLIC ACID 1 MG/1
1 TABLET ORAL DAILY
COMMUNITY

## 2024-04-01 RX ORDER — LORAZEPAM 2 MG/1
2 TABLET ORAL
Status: DISCONTINUED | OUTPATIENT
Start: 2024-04-01 | End: 2024-04-01 | Stop reason: HOSPADM

## 2024-04-01 RX ORDER — PANTOPRAZOLE SODIUM 40 MG/1
40 TABLET, DELAYED RELEASE ORAL DAILY
Status: CANCELLED | OUTPATIENT
Start: 2024-04-01

## 2024-04-01 RX ORDER — THIAMINE HYDROCHLORIDE 100 MG/ML
200 INJECTION, SOLUTION INTRAMUSCULAR; INTRAVENOUS EVERY 8 HOURS SCHEDULED
Status: DISCONTINUED | OUTPATIENT
Start: 2024-04-04 | End: 2024-04-02 | Stop reason: HOSPADM

## 2024-04-01 RX ORDER — LORAZEPAM 2 MG/1
2 TABLET ORAL
Status: DISCONTINUED | OUTPATIENT
Start: 2024-04-01 | End: 2024-04-02 | Stop reason: HOSPADM

## 2024-04-01 RX ORDER — MAGNESIUM OXIDE 400 MG/1
400 TABLET ORAL DAILY
COMMUNITY

## 2024-04-01 RX ORDER — METHION/INOS/CHOL BT/B COM/LIV 110MG-86MG
100 CAPSULE ORAL DAILY
Status: DISCONTINUED | OUTPATIENT
Start: 2024-04-08 | End: 2024-04-02 | Stop reason: HOSPADM

## 2024-04-01 RX ORDER — LORAZEPAM 2 MG/ML
1 INJECTION INTRAMUSCULAR
Status: DISCONTINUED | OUTPATIENT
Start: 2024-04-01 | End: 2024-04-02 | Stop reason: HOSPADM

## 2024-04-01 RX ORDER — LORAZEPAM 2 MG/ML
1 INJECTION INTRAMUSCULAR EVERY 6 HOURS
Status: DISCONTINUED | OUTPATIENT
Start: 2024-04-02 | End: 2024-04-01

## 2024-04-01 RX ORDER — NICOTINE POLACRILEX 4 MG
15 LOZENGE BUCCAL
Status: DISCONTINUED | OUTPATIENT
Start: 2024-04-01 | End: 2024-04-02 | Stop reason: HOSPADM

## 2024-04-01 RX ORDER — ACETAMINOPHEN 500 MG
1000 TABLET ORAL EVERY 8 HOURS PRN
Status: DISCONTINUED | OUTPATIENT
Start: 2024-04-01 | End: 2024-04-02 | Stop reason: HOSPADM

## 2024-04-01 RX ORDER — BISACODYL 5 MG/1
5 TABLET, DELAYED RELEASE ORAL DAILY PRN
Status: DISCONTINUED | OUTPATIENT
Start: 2024-04-01 | End: 2024-04-02 | Stop reason: HOSPADM

## 2024-04-01 RX ORDER — LORAZEPAM 2 MG/ML
2 INJECTION INTRAMUSCULAR ONCE
Status: COMPLETED | OUTPATIENT
Start: 2024-04-01 | End: 2024-04-01

## 2024-04-01 RX ORDER — SODIUM CHLORIDE 0.9 % (FLUSH) 0.9 %
10 SYRINGE (ML) INJECTION AS NEEDED
Status: DISCONTINUED | OUTPATIENT
Start: 2024-04-01 | End: 2024-04-02 | Stop reason: HOSPADM

## 2024-04-01 RX ORDER — NALOXONE HYDROCHLORIDE 4 MG/.1ML
1 SPRAY NASAL AS NEEDED
COMMUNITY
End: 2024-04-04 | Stop reason: HOSPADM

## 2024-04-01 RX ORDER — SODIUM CHLORIDE 9 MG/ML
40 INJECTION, SOLUTION INTRAVENOUS AS NEEDED
Status: DISCONTINUED | OUTPATIENT
Start: 2024-04-01 | End: 2024-04-02 | Stop reason: HOSPADM

## 2024-04-01 RX ORDER — LORAZEPAM 2 MG/ML
2 INJECTION INTRAMUSCULAR
Status: DISCONTINUED | OUTPATIENT
Start: 2024-04-01 | End: 2024-04-02 | Stop reason: HOSPADM

## 2024-04-01 RX ORDER — NICOTINE 21 MG/24HR
1 PATCH, TRANSDERMAL 24 HOURS TRANSDERMAL
Status: DISCONTINUED | OUTPATIENT
Start: 2024-04-01 | End: 2024-04-02 | Stop reason: HOSPADM

## 2024-04-01 RX ORDER — POLYETHYLENE GLYCOL 3350 17 G/17G
17 POWDER, FOR SOLUTION ORAL DAILY PRN
Status: DISCONTINUED | OUTPATIENT
Start: 2024-04-01 | End: 2024-04-02 | Stop reason: HOSPADM

## 2024-04-01 RX ORDER — DIPHENOXYLATE HYDROCHLORIDE AND ATROPINE SULFATE 2.5; .025 MG/1; MG/1
1 TABLET ORAL DAILY
Status: DISCONTINUED | OUTPATIENT
Start: 2024-04-01 | End: 2024-04-02 | Stop reason: HOSPADM

## 2024-04-01 RX ORDER — LORAZEPAM 2 MG/ML
2 INJECTION INTRAMUSCULAR
Status: DISCONTINUED | OUTPATIENT
Start: 2024-04-01 | End: 2024-04-01 | Stop reason: HOSPADM

## 2024-04-01 RX ORDER — GLUCAGON 1 MG/ML
1 KIT INJECTION
Status: DISCONTINUED | OUTPATIENT
Start: 2024-04-01 | End: 2024-04-02 | Stop reason: HOSPADM

## 2024-04-01 RX ORDER — ONDANSETRON 2 MG/ML
4 INJECTION INTRAMUSCULAR; INTRAVENOUS EVERY 6 HOURS PRN
Status: DISCONTINUED | OUTPATIENT
Start: 2024-04-01 | End: 2024-04-02 | Stop reason: HOSPADM

## 2024-04-01 RX ORDER — BUSPIRONE HYDROCHLORIDE 10 MG/1
10 TABLET ORAL 2 TIMES DAILY
Status: DISCONTINUED | OUTPATIENT
Start: 2024-04-01 | End: 2024-04-02 | Stop reason: HOSPADM

## 2024-04-01 RX ORDER — BUDESONIDE AND FORMOTEROL FUMARATE DIHYDRATE 160; 4.5 UG/1; UG/1
2 AEROSOL RESPIRATORY (INHALATION)
Status: DISCONTINUED | OUTPATIENT
Start: 2024-04-01 | End: 2024-04-02 | Stop reason: HOSPADM

## 2024-04-01 RX ORDER — LORAZEPAM 2 MG/ML
2 INJECTION INTRAMUSCULAR EVERY 6 HOURS
Status: DISCONTINUED | OUTPATIENT
Start: 2024-04-01 | End: 2024-04-01

## 2024-04-01 RX ORDER — DEXTROSE MONOHYDRATE 25 G/50ML
25 INJECTION, SOLUTION INTRAVENOUS
Status: DISCONTINUED | OUTPATIENT
Start: 2024-04-01 | End: 2024-04-02 | Stop reason: HOSPADM

## 2024-04-01 RX ORDER — CHLORDIAZEPOXIDE HYDROCHLORIDE 25 MG/1
25 CAPSULE, GELATIN COATED ORAL 2 TIMES DAILY
Status: DISCONTINUED | OUTPATIENT
Start: 2024-04-02 | End: 2024-04-02 | Stop reason: HOSPADM

## 2024-04-01 RX ORDER — CHLORDIAZEPOXIDE HYDROCHLORIDE 25 MG/1
25 CAPSULE, GELATIN COATED ORAL EVERY 8 HOURS SCHEDULED
Status: COMPLETED | OUTPATIENT
Start: 2024-04-01 | End: 2024-04-01

## 2024-04-01 RX ORDER — CHLORDIAZEPOXIDE HYDROCHLORIDE 10 MG/1
10 CAPSULE, GELATIN COATED ORAL 2 TIMES DAILY
Status: DISCONTINUED | OUTPATIENT
Start: 2024-04-03 | End: 2024-04-02 | Stop reason: HOSPADM

## 2024-04-01 RX ORDER — LORAZEPAM 1 MG/1
1 TABLET ORAL
Status: DISCONTINUED | OUTPATIENT
Start: 2024-04-01 | End: 2024-04-02 | Stop reason: HOSPADM

## 2024-04-01 RX ORDER — LORAZEPAM 2 MG/ML
4 INJECTION INTRAMUSCULAR
Status: DISCONTINUED | OUTPATIENT
Start: 2024-04-01 | End: 2024-04-02 | Stop reason: HOSPADM

## 2024-04-01 RX ORDER — BISACODYL 10 MG
10 SUPPOSITORY, RECTAL RECTAL DAILY PRN
Status: DISCONTINUED | OUTPATIENT
Start: 2024-04-01 | End: 2024-04-02 | Stop reason: HOSPADM

## 2024-04-01 RX ORDER — PRAZOSIN HYDROCHLORIDE 1 MG/1
2 CAPSULE ORAL NIGHTLY
Status: DISCONTINUED | OUTPATIENT
Start: 2024-04-01 | End: 2024-04-02 | Stop reason: HOSPADM

## 2024-04-01 RX ORDER — LORAZEPAM 2 MG/1
4 TABLET ORAL
Status: DISCONTINUED | OUTPATIENT
Start: 2024-04-01 | End: 2024-04-02 | Stop reason: HOSPADM

## 2024-04-01 RX ORDER — AMOXICILLIN 250 MG
2 CAPSULE ORAL 2 TIMES DAILY
Status: DISCONTINUED | OUTPATIENT
Start: 2024-04-01 | End: 2024-04-02 | Stop reason: HOSPADM

## 2024-04-01 RX ORDER — DULOXETIN HYDROCHLORIDE 60 MG/1
60 CAPSULE, DELAYED RELEASE ORAL DAILY
Status: DISCONTINUED | OUTPATIENT
Start: 2024-04-01 | End: 2024-04-02 | Stop reason: HOSPADM

## 2024-04-01 RX ORDER — CHLORDIAZEPOXIDE HYDROCHLORIDE 25 MG/1
50 CAPSULE, GELATIN COATED ORAL 3 TIMES DAILY PRN
Status: ACTIVE | OUTPATIENT
Start: 2024-04-01 | End: 2024-04-02

## 2024-04-01 RX ORDER — CHLORDIAZEPOXIDE HYDROCHLORIDE 10 MG/1
10 CAPSULE, GELATIN COATED ORAL ONCE
Status: DISCONTINUED | OUTPATIENT
Start: 2024-04-04 | End: 2024-04-02 | Stop reason: HOSPADM

## 2024-04-01 RX ORDER — HEPARIN SODIUM 5000 [USP'U]/ML
5000 INJECTION, SOLUTION INTRAVENOUS; SUBCUTANEOUS EVERY 12 HOURS SCHEDULED
Status: DISCONTINUED | OUTPATIENT
Start: 2024-04-01 | End: 2024-04-02 | Stop reason: HOSPADM

## 2024-04-01 RX ORDER — METHOCARBAMOL 500 MG/1
500 TABLET, FILM COATED ORAL 3 TIMES DAILY PRN
COMMUNITY
End: 2024-04-04 | Stop reason: HOSPADM

## 2024-04-01 RX ORDER — SODIUM CHLORIDE 0.9 % (FLUSH) 0.9 %
10 SYRINGE (ML) INJECTION EVERY 12 HOURS SCHEDULED
Status: DISCONTINUED | OUTPATIENT
Start: 2024-04-01 | End: 2024-04-02 | Stop reason: HOSPADM

## 2024-04-01 RX ORDER — PANTOPRAZOLE SODIUM 40 MG/10ML
40 INJECTION, POWDER, LYOPHILIZED, FOR SOLUTION INTRAVENOUS
Status: DISCONTINUED | OUTPATIENT
Start: 2024-04-01 | End: 2024-04-01 | Stop reason: ALTCHOICE

## 2024-04-01 RX ORDER — CLONIDINE HYDROCHLORIDE 0.1 MG/1
0.1 TABLET ORAL EVERY 6 HOURS PRN
Status: DISCONTINUED | OUTPATIENT
Start: 2024-04-01 | End: 2024-04-02 | Stop reason: HOSPADM

## 2024-04-01 RX ORDER — DIPHENOXYLATE HYDROCHLORIDE AND ATROPINE SULFATE 2.5; .025 MG/1; MG/1
1 TABLET ORAL DAILY
COMMUNITY

## 2024-04-01 RX ORDER — SENNOSIDES 8.6 MG
650 CAPSULE ORAL EVERY 8 HOURS PRN
COMMUNITY
End: 2024-04-04 | Stop reason: HOSPADM

## 2024-04-01 RX ORDER — NAPROXEN 500 MG/1
500 TABLET ORAL 2 TIMES DAILY PRN
COMMUNITY
End: 2024-04-02 | Stop reason: HOSPADM

## 2024-04-01 RX ORDER — METHOCARBAMOL 500 MG/1
500 TABLET, FILM COATED ORAL 3 TIMES DAILY PRN
Status: DISCONTINUED | OUTPATIENT
Start: 2024-04-01 | End: 2024-04-02 | Stop reason: HOSPADM

## 2024-04-01 RX ORDER — QUETIAPINE FUMARATE 25 MG/1
50 TABLET, FILM COATED ORAL 3 TIMES DAILY
Status: DISCONTINUED | OUTPATIENT
Start: 2024-04-01 | End: 2024-04-02 | Stop reason: HOSPADM

## 2024-04-01 RX ORDER — LISINOPRIL 10 MG/1
20 TABLET ORAL DAILY
Status: CANCELLED | OUTPATIENT
Start: 2024-04-01

## 2024-04-01 RX ADMIN — CHLORDIAZEPOXIDE HYDROCHLORIDE 25 MG: 25 CAPSULE ORAL at 05:50

## 2024-04-01 RX ADMIN — NICOTINE TRANSDERMAL SYSTEM 1 PATCH: 21 PATCH, EXTENDED RELEASE TRANSDERMAL at 11:41

## 2024-04-01 RX ADMIN — DULOXETINE HYDROCHLORIDE 60 MG: 60 CAPSULE, DELAYED RELEASE ORAL at 09:20

## 2024-04-01 RX ADMIN — Medication 10 ML: at 09:23

## 2024-04-01 RX ADMIN — CHLORDIAZEPOXIDE HYDROCHLORIDE 25 MG: 25 CAPSULE ORAL at 15:20

## 2024-04-01 RX ADMIN — BUDESONIDE AND FORMOTEROL FUMARATE DIHYDRATE 2 PUFF: 160; 4.5 AEROSOL RESPIRATORY (INHALATION) at 18:27

## 2024-04-01 RX ADMIN — THIAMINE HYDROCHLORIDE 500 MG: 100 INJECTION, SOLUTION INTRAMUSCULAR; INTRAVENOUS at 21:06

## 2024-04-01 RX ADMIN — Medication 10 ML: at 21:07

## 2024-04-01 RX ADMIN — LORAZEPAM 2 MG: 2 INJECTION, SOLUTION INTRAMUSCULAR; INTRAVENOUS at 09:32

## 2024-04-01 RX ADMIN — LORAZEPAM 2 MG: 2 INJECTION, SOLUTION INTRAMUSCULAR; INTRAVENOUS at 00:55

## 2024-04-01 RX ADMIN — BUSPIRONE HYDROCHLORIDE 10 MG: 10 TABLET ORAL at 09:20

## 2024-04-01 RX ADMIN — Medication 1 TABLET: at 09:20

## 2024-04-01 RX ADMIN — BUDESONIDE AND FORMOTEROL FUMARATE DIHYDRATE 2 PUFF: 160; 4.5 AEROSOL RESPIRATORY (INHALATION) at 08:43

## 2024-04-01 RX ADMIN — ACETAMINOPHEN 1000 MG: 500 TABLET ORAL at 21:06

## 2024-04-01 RX ADMIN — LORAZEPAM 2 MG: 2 INJECTION, SOLUTION INTRAMUSCULAR; INTRAVENOUS at 05:35

## 2024-04-01 RX ADMIN — TIOTROPIUM BROMIDE INHALATION SPRAY 2 PUFF: 3.12 SPRAY, METERED RESPIRATORY (INHALATION) at 08:43

## 2024-04-01 RX ADMIN — CHLORDIAZEPOXIDE HYDROCHLORIDE 25 MG: 25 CAPSULE ORAL at 21:05

## 2024-04-01 RX ADMIN — PANTOPRAZOLE SODIUM 40 MG: 40 INJECTION, POWDER, FOR SOLUTION INTRAVENOUS at 05:50

## 2024-04-01 RX ADMIN — QUETIAPINE FUMARATE 50 MG: 25 TABLET ORAL at 15:20

## 2024-04-01 RX ADMIN — MAGNESIUM GLUCONATE 500 MG ORAL TABLET 400 MG: 500 TABLET ORAL at 09:20

## 2024-04-01 RX ADMIN — THIAMINE HYDROCHLORIDE 500 MG: 100 INJECTION, SOLUTION INTRAMUSCULAR; INTRAVENOUS at 07:24

## 2024-04-01 RX ADMIN — LISINOPRIL 10 MG: 10 TABLET ORAL at 09:16

## 2024-04-01 RX ADMIN — THIAMINE HYDROCHLORIDE 100 ML/HR: 100 INJECTION, SOLUTION INTRAMUSCULAR; INTRAVENOUS at 09:51

## 2024-04-01 RX ADMIN — QUETIAPINE FUMARATE 50 MG: 25 TABLET ORAL at 21:05

## 2024-04-01 RX ADMIN — HEPARIN SODIUM 5000 UNITS: 5000 INJECTION INTRAVENOUS; SUBCUTANEOUS at 21:06

## 2024-04-01 RX ADMIN — PRAZOSIN HYDROCHLORIDE 2 MG: 1 CAPSULE ORAL at 21:05

## 2024-04-01 RX ADMIN — LORAZEPAM 2 MG: 2 INJECTION, SOLUTION INTRAMUSCULAR; INTRAVENOUS at 04:51

## 2024-04-01 RX ADMIN — CARIPRAZINE 1.5 MG: 1.5 CAPSULE, GELATIN COATED ORAL at 09:16

## 2024-04-01 RX ADMIN — LORAZEPAM 2 MG: 2 INJECTION, SOLUTION INTRAMUSCULAR; INTRAVENOUS at 02:04

## 2024-04-01 RX ADMIN — HEPARIN SODIUM 5000 UNITS: 5000 INJECTION INTRAVENOUS; SUBCUTANEOUS at 09:21

## 2024-04-01 RX ADMIN — LORAZEPAM 2 MG: 2 TABLET ORAL at 00:12

## 2024-04-01 RX ADMIN — BUSPIRONE HYDROCHLORIDE 10 MG: 10 TABLET ORAL at 21:05

## 2024-04-01 RX ADMIN — QUETIAPINE FUMARATE 50 MG: 25 TABLET ORAL at 09:20

## 2024-04-01 RX ADMIN — THIAMINE HYDROCHLORIDE 500 MG: 100 INJECTION, SOLUTION INTRAMUSCULAR; INTRAVENOUS at 15:20

## 2024-04-01 NOTE — NURSING NOTE
Pt becoming more confused and agitated, pt is insistent on going to go smoke and becomes angry that he can't go outside to smoke. CIWA 16 and /76 and . Notified Dr. Chawla of situation.     New orders:   Seroquel 50mg PO ONCE STAT and change prn ativan frequency to ativan 2mg PO every 30 minutes prn RBTOx2 per Dr. Chawla

## 2024-04-01 NOTE — PROGRESS NOTES
THC Physician - Brief Progress Note  PERMANENT  04/01/2024 05:38    [facility] [place] [facility] - [facility] [place] ([facility])    IWONATRELL    Date of Service 04/01/2024 05:38    HPI/Events of Note [facility] Provider Assessment Note  Brief HPI:   53M admitted for detox.  Developed DTs refractory to lower levels of ativan as typical on the psych service and was transfereed to ICU for further management.  Past medical history includes but not limited to:Alcoholism  Video Exam:   Awake, room air, no distress.  155/87, 87, 94%, 14.  Calm and communicative.  Labs:    Reviewed  Plan includes:     [facility] protocol  Nutritional/Vitamin supplementation  _x___   Video Assessment performed  __x___   Most recent labs reviewed  __x___   Vital Signs reviewed  __x___   Best Practices addressed:                 VTE prophylaxis:          SQH                 SUP (when indicated):  PPI     _x___      Spoke with bedside RN – has orders from bedside physician.  No HH needs.  Please update [facility] with any needs or concerns.     [facility] Provider Assessment Note  Brief HPI:   53M admitted for detox.  Developed DTs refractory to lower levels of ativan as typical on the psych service and was transfereed to ICU for further management.  Past medical history includes but not limited to:  Video Exam:   Awake, room air, no distress.  155/87, 87, 94%, 14.  Calm and communicative.  Labs:    Reviewed  Plan includes:     CIWA protocol  Nutritional/Vitamin supplementation  _x___   Video Assessment performed  __x___   Most recent labs reviewed  __x___   Vital Signs reviewed  __x___   Best Practices addressed:                 VTE prophylaxis:          SQH                 SUP (when indicated):  PPI     _x___      Spoke with bedside RN – has orders from bedside physician.  No HH needs.  Please update [facility] with any needs or concerns.      Interventions Major-Other: DTs        Electronically Signed by:  Leonard Sandoval) on 04/01/2024 05:39

## 2024-04-01 NOTE — NURSING NOTE
ONE HOUR FACE TO FACE COMPLETED ON BRIEF HOLD.  PT REMAINS CONFUSED AND DISORIENTED TO PLACE AND TIME BUT IS ORIENTED TO NAME ONLY.   PT REFUSED FULL VITALS BUT DID ALLOW , 02 97%, NO HARM TO PT OR STAFF.  PT WILL BE MOVED TO CCU#5 TO CONTINUE HIS DETOX.  DR TAYLOR NOTIFIED BY VALERY COLLIER [facility] MADE AWARE.

## 2024-04-01 NOTE — H&P
"[facility]   HISTORY AND PHYSICAL    Patient Name: Leonard Crandall  : 1970  MRN: [id number]  Primary Care Physician:  Jhon Khoury PA-C  Date of admission: 2024    Subjective   Subjective     Chief Complaint: DT's    History of Present Illness the patient is a 53-year-old male with past medical history significant for alcoholism, anxiety, bipolar disorder, COPD, depression, tobacco use and hypertension who presents to the emergency department with complaints of alcohol use and withdrawal.  The patient was admitted to inpatient detox on 3/29/2024 and was started on Ativan detox.  Hospital medicine was consulted on 2024 for worsening alcohol withdrawal and delirium tremens.    I examined the patient in the clinical detox unit in room 41 with nursing staff and security.  At that time, the patient was sitting on the edge of the bed but was very restless and somewhat agitated.  The patient was wanting to go outside and smoke and continue to attempt to stand but was noted to be unsteady on his feet.  Patient did have an episode of visual hallucinations during my visit as he was \"loading the washing machine\" imaginary object in his hand.    According to the initial H&P patient had reported drinking two 5ths of vodka daily.  It was reported on 3/31/2024 that the patient started to have hallucinations on the evening of 3/30/2024 and also complained of dizziness.    Review of Systems   Unable to perform ROS: Mental status change      Personal History     Past Medical History:   Diagnosis Date    Alcoholism 2022    Anxiety     Bipolar disorder     COPD (chronic obstructive pulmonary disease) 2022    Depression     Hypertension     Tobacco abuse 2022    Withdrawal symptoms, alcohol        No past surgical history on file.    Family History: family history includes Acne in his mother; Diabetes in his mother; Heart attack in his father.     Social History:  reports that he has been smoking " "cigarettes. He started smoking about 39 years ago. He has a 40 pack-year smoking history. He has quit using smokeless tobacco. He reports current alcohol use. He reports that he does not use drugs.    Home Medications:  Cariprazine HCl, DULoxetine, Fluticasone-Salmeterol, QUEtiapine, Tiotropium Bromide Monohydrate, acetaminophen, albuterol sulfate HFA, amitriptyline, busPIRone, folic acid, lisinopril, magnesium oxide, meloxicam, methocarbamol, multivitamin, naloxone, naproxen, pantoprazole, prazosin, and thiamine    Allergies:  Allergies   Allergen Reactions    Fish-Derived Products Anaphylaxis     \"My neck swells up\"       Objective    Objective     Vitals:   Temp:  [97.9 °F (36.6 °C)-98.4 °F (36.9 °C)] 98.4 °F (36.9 °C)  Heart Rate:  [] 121  Resp:  [16-20] 20  BP: (128-164)/() 159/76    Physical Exam  Constitutional:       General: He is not in acute distress.     Appearance: He is well-developed.   HENT:      Head: Normocephalic and atraumatic.   Eyes:      Conjunctiva/sclera: Conjunctivae normal.   Neck:      Trachea: No tracheal deviation.   Cardiovascular:      Rate and Rhythm: Regular rhythm. Tachycardia present.      Heart sounds: No murmur heard.     No friction rub. No gallop.      Comments: No significant lower extremity edema  Pulmonary:      Effort: No respiratory distress.      Breath sounds: Normal breath sounds. No wheezing or rales.      Comments: Occasional coarse B/L breath sounds  Abdominal:      General: Bowel sounds are normal. There is no distension.      Palpations: Abdomen is soft.      Tenderness: There is no abdominal tenderness. There is no guarding.   Skin:     General: Skin is warm and dry.      Findings: No erythema or rash.   Neurological:      Mental Status: He is alert.      Comments: Oriented to self and time; follows some commands   Psychiatric:         Attention and Perception: He is inattentive. He perceives visual hallucinations.         Mood and Affect: Mood is " anxious.         Behavior: Behavior is agitated.         Thought Content: Thought content is paranoid.         Result Review    Result Review:  I have personally reviewed the results from the time of this admission to 4/1/2024 04:47 EDT and agree with these findings:  [x]  Laboratory list / accordion  []  Microbiology  []  Radiology  []  EKG/Telemetry   []  Cardiology/Vascular   []  Pathology  []  Old records  []  Other:  Most notable findings include:     Results from last 7 days   Lab Units 04/01/24  0122 03/28/24  1822   WBC 10*3/mm3 7.23 10.60   HEMOGLOBIN g/dL 14.1 16.4   HEMATOCRIT % 42.0 48.7   PLATELETS 10*3/mm3 226 241     Results from last 7 days   Lab Units 04/01/24  0122 03/28/24  1822   SODIUM mmol/L 135* 136   POTASSIUM mmol/L 4.1 3.8   CHLORIDE mmol/L 104 100   CO2 mmol/L 22.1 22.5   BUN mg/dL 11 9   CREATININE mg/dL 0.89 0.94   CALCIUM mg/dL 9.0 9.0   BILIRUBIN mg/dL 0.2 0.7   ALK PHOS U/L 77 93   ALT (SGPT) U/L 21 36   AST (SGOT) U/L 23 35   GLUCOSE mg/dL 125* 116*       Assessment / Plan     #Severe alcohol use disorder with dependence  #Acute alcohol withdrawal with delirium tremens  #Uncontrolled essential hypertension due to above  #SIRS with tachycardia and RR 20 due to above  #Tobacco use disorder  -Patient has been admitted to the critical care unit from inpatient detox for close observation and management with fall/safety and seizure precautions  -Patient has been started on scheduled IV thiamine in addition to a daily banana bag  -Patient has received a one-time dose of IV Ativan since his arrival to the critical care unit  -Continue with the as needed Ativan detox protocol as needed  -Will plan for a scheduled Librium taper beginning this morning  -May consider gabapentin and/or phenobarbital for worsening DTs  -Patient is overall hemodynamically stable with a current BP of 149/97 mmHg, respiratory rate 20, heart rate 92, temperature 98 °F and O2 saturation of 94% on room air  -Will try  PRN clonidine for BP control; will resume home lisinopril as well  -Consider a psychiatry consult once patient is stabilized as he may require further detox prior to returning home    Chronic medical conditions:  -Major depressive disorder  -COPD not in acute exacerbation  -GERD    DVT/GI prophylaxis:  SQH/PPI    CODE STATUS:    Code Status (Patient has no pulse and is not breathing): CPR (Attempt to Resuscitate)  Medical Interventions (Patient has pulse or is breathing): Full Support    Admission Status:  I believe this patient meets inpatient status.    Priyanka Thomas, DO

## 2024-04-01 NOTE — DISCHARGE SUMMARY
Date of Discharge: 4/1/2024    Discharge Diagnosis:Principal Problem:    Alcohol use disorder, severe, dependence  Active Problems:    Nicotine use disorder    COPD (chronic obstructive pulmonary disease)      Hospital Course:  Patient was admitted for detox and stabilization.   Routine labs were checked.  Patient was assigned a master's level therapist and provided with an opportunity to participate in group and individual chemical dependency counseling on the unit.  Patient was started on an Ativan detox protocol and 100mg of thiamine daily.     Patient's home medications for depression and anxiety were continued while inpatient. On evening of 3/31/24 patient mental status began to further decline with progression of VH and responding to internal stimuli.  Level of mentation and attention were impaired with difficulty following commands and increasing agitation. Presentation was cw delirium tremens.  Frequency of PRN ativan 2mg was increased to every hour then every 30 minutes.  Despite multiple doses, patient's mentation continued to decline CIWA scores elevated into the 20's.  He began to become nauseous with concern for toleration of PO ativan and ability to follow directions.  IM ativan 2mg was initiated q20 minutes however patient's O2 sats started to declines to 93% presenting a limitation to further treatment of his severe alcohol WD and progressing DT via benzos. Medicine was consulted who assessed case and discussed with this provider.  Decided to transfer patient to ICU for closer monitoring, IV access, and capacity for hemodynamic/respiratory stabilization.    Consults:   Consults       Date and Time Order Name Status Description    4/1/2024  1:46 AM Inpatient Hospitalist Consult              Labs:  Lab Results (all)       Procedure Component Value Units Date/Time    Comprehensive Metabolic Panel [[id number]]  (Abnormal) Collected: 04/01/24 0122    Specimen: Blood Updated: 04/01/24 0209     Glucose 125  mg/dL      BUN 11 mg/dL      Creatinine 0.89 mg/dL      Sodium 135 mmol/L      Potassium 4.1 mmol/L      Comment: Slight hemolysis detected by analyzer. Result may be falsely elevated.        Chloride 104 mmol/L      CO2 22.1 mmol/L      Calcium 9.0 mg/dL      Total Protein 6.1 g/dL      Albumin 3.7 g/dL      ALT (SGPT) 21 U/L      AST (SGOT) 23 U/L      Alkaline Phosphatase 77 U/L      Total Bilirubin 0.2 mg/dL      Globulin 2.4 gm/dL      A/G Ratio 1.5 g/dL      BUN/Creatinine Ratio 12.4     Anion Gap 8.9 mmol/L      eGFR 103.8 mL/min/1.73     Narrative:      GFR Normal >60  Chronic Kidney Disease <60  Kidney Failure <15      CBC & Differential [[id number]]  (Normal) Collected: 04/01/24 0122    Specimen: Blood Updated: 04/01/24 0150    Narrative:      The following orders were created for panel order CBC & Differential.  Procedure                               Abnormality         Status                     ---------                               -----------         ------                     CBC Auto Differential[[id number]]        Normal              Final result                 Please view results for these tests on the individual orders.    CBC Auto Differential [[id number]]  (Normal) Collected: 04/01/24 0122    Specimen: Blood Updated: 04/01/24 0150     WBC 7.23 10*3/mm3      RBC 4.34 10*6/mm3      Hemoglobin 14.1 g/dL      Hematocrit 42.0 %      MCV 96.8 fL      MCH 32.5 pg      MCHC 33.6 g/dL      RDW 13.6 %      RDW-SD 48.3 fl      MPV 9.5 fL      Platelets 226 10*3/mm3      Neutrophil % 51.3 %      Lymphocyte % 32.5 %      Monocyte % 9.7 %      Eosinophil % 5.5 %      Basophil % 0.7 %      Immature Grans % 0.3 %      Neutrophils, Absolute 3.71 10*3/mm3      Lymphocytes, Absolute 2.35 10*3/mm3      Monocytes, Absolute 0.70 10*3/mm3      Eosinophils, Absolute 0.40 10*3/mm3      Basophils, Absolute 0.05 10*3/mm3      Immature Grans, Absolute 0.02 10*3/mm3      nRBC 0.0 /100 WBC             Imaging:  Imaging  Results (All)       Procedure Component Value Units Date/Time    XR Shoulder 2+ View Right [[id number]] Collected: 03/31/24 1433     Updated: 03/31/24 1443    Narrative:      Exam: XR SHOULDER 2+ VW RIGHT-     History: fall, pain, movement restricted     Comparison: No images available     Findings:     No acute fracture or dislocation.     Soft tissue are normal.       Impression:      Impression:     No acute bony process.     This report was finalized on 3/31/2024 2:35 PM by Ti Lala MD.                       Condition on Discharge:  guarded    Interval Review of Systems at Discharge:  Unable to assess as urgent transfer during home call.      Mental Status Exam:  Unable to assess as urgent transfer during home call.        Vital Signs  Temp:  [97.9 °F (36.6 °C)-98.4 °F (36.9 °C)] 98.4 °F (36.9 °C)  Heart Rate:  [] 109  Resp:  [16-20] 20  BP: (128-164)/() 159/76    Discharge Disposition  [facility] (DC - External or [facility])    Discharge Medications     Discharge Medications        Continue These Medications        Instructions Start Date   albuterol sulfate  (90 Base) MCG/ACT inhaler  Commonly known as: PROVENTIL HFA;VENTOLIN HFA;PROAIR HFA   2 puffs, Inhalation, Every 4 Hours PRN      amitriptyline 50 MG tablet  Commonly known as: ELAVIL   50 mg, Oral, Nightly      busPIRone 10 MG tablet  Commonly known as: BUSPAR   10 mg, Oral, 2 Times Daily      DULoxetine 60 MG capsule  Commonly known as: CYMBALTA   60 mg, Oral, Daily      Fluticasone-Salmeterol 250-50 MCG/ACT DISKUS  Commonly known as: ADVAIR/WIXELA   1 puff, Inhalation, 2 Times Daily - RT      lisinopril 20 MG tablet  Commonly known as: PRINIVIL,ZESTRIL   20 mg, Oral, Daily      pantoprazole 40 MG EC tablet  Commonly known as: PROTONIX   40 mg, Oral, Daily      prazosin 2 MG capsule  Commonly known as: MINIPRESS   2 mg, Oral, Nightly      QUEtiapine 50 MG tablet  Commonly known as: SEROquel   50 mg, Oral, 3 Times  Daily      Spiriva Respimat 1.25 MCG/ACT aerosol solution inhaler  Generic drug: Tiotropium Bromide Monohydrate   2 puffs, Inhalation, Daily - RT      Vraylar 1.5 MG capsule capsule  Generic drug: Cariprazine HCl   1.5 mg, Oral, Daily             Stop These Medications      acetaminophen 650 MG 8 hr tablet  Commonly known as: TYLENOL     folic acid 1 MG tablet  Commonly known as: FOLVITE     magnesium oxide 400 MG tablet  Commonly known as: MAG-OX     meloxicam 15 MG tablet  Commonly known as: MOBIC     methocarbamol 500 MG tablet  Commonly known as: ROBAXIN     multivitamin with minerals tablet tablet     naloxone 4 MG/0.1ML nasal spray  Commonly known as: NARCAN     naproxen 500 MG tablet  Commonly known as: NAPROSYN     thiamine 100 MG tablet  tablet  Commonly known as: VITAMIN B-1              Discharge Diet: Regular    Activity at Discharge: As tolerated    Follow-up Appointments:    No future appointments.        Time: I spent  60 minutes on this discharge activity which included: encounter with the patient, reviewing the data in the system, coordination of the care with the nursing staff as well as consultants, documentation, and entering orders.      Bakari Chawla MD  04/01/24  03:24 EDT

## 2024-04-01 NOTE — NURSING NOTE
Notified Dr. Chawla that patient was getting more confused and agitated, he is a fall risk, and becoming unsteady when ambulating.     New Orders:   Sitter at bedside for fall precautions and ativan 2mg PO every 1 hr prn (hold medication if o2 saturations drop below 92%) RBTOx2 per Dr. Chawla.

## 2024-04-01 NOTE — PROGRESS NOTES
Patient admitted to medicine service after midnight by Dr. Thomas. I have seen and evaluated nelsy.     Mr. Crandall is our 52 yo M with hx alcoholism, anxiety, bipolar disorder, COPD, depression, tobacco use and hypertension who presented to inpatient detox on 3/29 for alcohol detox and transferred to medical unit last night due to hallucinations. Patient started on librium taper along with PRN ativan per CIWA. He is receiving thiamine. He was drowsy but agreeable and interactive on my evaluation. He has woke up later today and has been agreeable per nursing staff. Will continue plan of care and close monitoring for worsening withdrawal per CIWA protocol.

## 2024-04-01 NOTE — NURSING NOTE
Pt was transferred to CCU via stretcher with the ZOLLS monitor by this RN and two security guards Martin and Derrek.     Stretcher was returned to gym, linens removed. Stretcher was sanitized and clean linens put on stretcher, ZOLLS monitor was sanitized as well by Patito OLSON.

## 2024-04-01 NOTE — NURSING NOTE
Contacted Dr. Chawla, patient's CIWA 25, /86, HR 84 and O2 Sat 94%, pt is still trying to get up out of bed and extremely agitated, restless, and continuing to have hallucinations and confusion. Unable to give patient PO medications due to whether or not the patient is able to comprehend to swallow medications.       New Orders:    Give ativan 2mg IM every 20 minutes prn unless patient O2 sat is below 92% and put order to consult Hospitalist for severe alcohol withdrawal RBTOx2 Dr. Chawla

## 2024-04-01 NOTE — PAYOR COMM NOTE
"[facility]  NPI:[id number]    Utilization Review  Contact: Sayra Bhatia RN  Phone: [phone]  Fax:[phone]    INITIATE INPATIENT AUTHORIZATION    Leonard Crandall (53 y.o. Male)       Date of Birth   1970    Social Security Number       Address   [address] [place] [id number]    Home Phone   [phone]    MRN   [id number]       Taoist   None    Marital Status   Single                            Admission Date   24    Admission Type   Urgent    Admitting Provider   Priyanka Thomas DO    Attending Provider   Priyanka Thomas DO    Department, Room/Bed   [facility] CRITICAL CARE, CC/       Discharge Date       Discharge Disposition       Discharge Destination                                 Attending Provider: Priyanka Thomas DO    Allergies: Fish-derived Products    Isolation: None   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)   Code Status: CPR    Ht: 188 cm (74\")   Wt: 116 kg (254 lb 13.6 oz)    Admission Cmt: None   Principal Problem: Alcohol withdrawal [F10.939]                   Active Insurance as of 2024       Primary Coverage       Payor Plan Insurance Group Employer/Plan Group    ANTHE[place] MEDICAID [place] MEDICAID [id number]       Payor Plan Address Payor Plan Phone Number Payor Plan Fax Number Effective Dates    [address] [address] [phone]  2021 - None Entered    [facility] [id number]         Subscriber Name Subscriber Birth Date Member ID       LEONARD CRANDALL 1970 [id number]                     Emergency Contacts        (Rel.) Home Phone Work Phone Mobile Phone    LEYDA MOHAN (Friend) -- -- [phone]                 History & Physical        Priyanka Thomas DO at [phone]          [facility]   HISTORY AND PHYSICAL    Patient Name: Leonardinez Crandall  : 1970  MRN: [id number]  Primary Care Physician:  Jhon Khoury PA-C  Date of admission: " "4/1/2024    Subjective  Subjective     Chief Complaint: DT's    History of Present Illness the patient is a 53-year-old male with past medical history significant for alcoholism, anxiety, bipolar disorder, COPD, depression, tobacco use and hypertension who presents to the emergency department with complaints of alcohol use and withdrawal.  The patient was admitted to inpatient detox on 3/29/2024 and was started on Ativan detox.  Hospital medicine was consulted on 4/1/2024 for worsening alcohol withdrawal and delirium tremens.    I examined the patient in the clinical detox unit in room 41 with nursing staff and security.  At that time, the patient was sitting on the edge of the bed but was very restless and somewhat agitated.  The patient was wanting to go outside and smoke and continue to attempt to stand but was noted to be unsteady on his feet.  Patient did have an episode of visual hallucinations during my visit as he was \"loading the washing machine\" imaginary object in his hand.    According to the initial H&P patient had reported drinking two 5ths of vodka daily.  It was reported on 3/31/2024 that the patient started to have hallucinations on the evening of 3/30/2024 and also complained of dizziness.    Review of Systems   Unable to perform ROS: Mental status change      Personal History     Past Medical History:   Diagnosis Date    Alcoholism 01/20/2022    Anxiety     Bipolar disorder     COPD (chronic obstructive pulmonary disease) 01/20/2022    Depression     Hypertension     Tobacco abuse 01/20/2022    Withdrawal symptoms, alcohol        No past surgical history on file.    Family History: family history includes Acne in his mother; Diabetes in his mother; Heart attack in his father.     Social History:  reports that he has been smoking cigarettes. He started smoking about 39 years ago. He has a 40 pack-year smoking history. He has quit using smokeless tobacco. He reports current alcohol use. He reports " "that he does not use drugs.    Home Medications:  Cariprazine HCl, DULoxetine, Fluticasone-Salmeterol, QUEtiapine, Tiotropium Bromide Monohydrate, acetaminophen, albuterol sulfate HFA, amitriptyline, busPIRone, folic acid, lisinopril, magnesium oxide, meloxicam, methocarbamol, multivitamin, naloxone, naproxen, pantoprazole, prazosin, and thiamine    Allergies:  Allergies   Allergen Reactions    Fish-Derived Products Anaphylaxis     \"My neck swells up\"       Objective   Objective     Vitals:   Temp:  [97.9 °F (36.6 °C)-98.4 °F (36.9 °C)] 98.4 °F (36.9 °C)  Heart Rate:  [] 121  Resp:  [16-20] 20  BP: (128-164)/() 159/76    Physical Exam  Constitutional:       General: He is not in acute distress.     Appearance: He is well-developed.   HENT:      Head: Normocephalic and atraumatic.   Eyes:      Conjunctiva/sclera: Conjunctivae normal.   Neck:      Trachea: No tracheal deviation.   Cardiovascular:      Rate and Rhythm: Regular rhythm. Tachycardia present.      Heart sounds: No murmur heard.     No friction rub. No gallop.      Comments: No significant lower extremity edema  Pulmonary:      Effort: No respiratory distress.      Breath sounds: Normal breath sounds. No wheezing or rales.      Comments: Occasional coarse B/L breath sounds  Abdominal:      General: Bowel sounds are normal. There is no distension.      Palpations: Abdomen is soft.      Tenderness: There is no abdominal tenderness. There is no guarding.   Skin:     General: Skin is warm and dry.      Findings: No erythema or rash.   Neurological:      Mental Status: He is alert.      Comments: Oriented to self and time; follows some commands   Psychiatric:         Attention and Perception: He is inattentive. He perceives visual hallucinations.         Mood and Affect: Mood is anxious.         Behavior: Behavior is agitated.         Thought Content: Thought content is paranoid.         Result Review   Result Review:  I have personally reviewed the " results from the time of this admission to 4/1/2024 04:47 EDT and agree with these findings:  [x]  Laboratory list / accordion  []  Microbiology  []  Radiology  []  EKG/Telemetry   []  Cardiology/Vascular   []  Pathology  []  Old records  []  Other:  Most notable findings include:     Results from last 7 days   Lab Units 04/01/24  0122 03/28/24  1822   WBC 10*3/mm3 7.23 10.60   HEMOGLOBIN g/dL 14.1 16.4   HEMATOCRIT % 42.0 48.7   PLATELETS 10*3/mm3 226 241     Results from last 7 days   Lab Units 04/01/24  0122 03/28/24  1822   SODIUM mmol/L 135* 136   POTASSIUM mmol/L 4.1 3.8   CHLORIDE mmol/L 104 100   CO2 mmol/L 22.1 22.5   BUN mg/dL 11 9   CREATININE mg/dL 0.89 0.94   CALCIUM mg/dL 9.0 9.0   BILIRUBIN mg/dL 0.2 0.7   ALK PHOS U/L 77 93   ALT (SGPT) U/L 21 36   AST (SGOT) U/L 23 35   GLUCOSE mg/dL 125* 116*       Assessment / Plan     #Severe alcohol use disorder with dependence  #Acute alcohol withdrawal with delirium tremens  #Uncontrolled essential hypertension due to above  #SIRS with tachycardia and RR 20 due to above  #Tobacco use disorder  -Patient has been admitted to the critical care unit from inpatient detox for close observation and management with fall/safety and seizure precautions  -Patient has been started on scheduled IV thiamine in addition to a daily banana bag  -Patient has received a one-time dose of IV Ativan since his arrival to the critical care unit  -Continue with the as needed Ativan detox protocol as needed  -Will plan for a scheduled Librium taper beginning this morning  -May consider gabapentin and/or phenobarbital for worsening DTs  -Patient is overall hemodynamically stable with a current BP of 149/97 mmHg, respiratory rate 20, heart rate 92, temperature 98 °F and O2 saturation of 94% on room air  -Will try PRN clonidine for BP control; will resume home lisinopril as well  -Consider a psychiatry consult once patient is stabilized as he may require further detox prior to returning  home    Chronic medical conditions:  -Major depressive disorder  -COPD not in acute exacerbation  -GERD    DVT/GI prophylaxis:  SQH/PPI    CODE STATUS:    Code Status (Patient has no pulse and is not breathing): CPR (Attempt to Resuscitate)  Medical Interventions (Patient has pulse or is breathing): Full Support    Admission Status:  I believe this patient meets inpatient status.    Priyanka Thomas DO    Electronically signed by Priyanka Thomas DO at 04/01/24 0533       Facility-Administered Medications as of 4/1/2024   Medication Dose Route Frequency Provider Last Rate Last Admin    sennosides-docusate (PERICOLACE) 8.6-50 MG per tablet 2 tablet  2 tablet Oral BID Priyanka Thomas DO        And    polyethylene glycol (MIRALAX) packet 17 g  17 g Oral Daily PRN Priyanka Thomas DO        And    bisacodyl (DULCOLAX) EC tablet 5 mg  5 mg Oral Daily PRN Priyanka Thomas DO        And    bisacodyl (DULCOLAX) suppository 10 mg  10 mg Rectal Daily PRN Priyanka Thomas DO        [START ON 4/3/2024] chlordiazePOXIDE (LIBRIUM) capsule 10 mg  10 mg Oral BID Priyanka Thomas DO        [START ON 4/4/2024] chlordiazePOXIDE (LIBRIUM) capsule 10 mg  10 mg Oral Once Priyanka Thomas DO        chlordiazePOXIDE (LIBRIUM) capsule 25 mg  25 mg Oral Q8H Priyanka Thomas DO   25 mg at 04/01/24 0550    [START ON 4/2/2024] chlordiazePOXIDE (LIBRIUM) capsule 25 mg  25 mg Oral BID Priyanka Thomas DO        chlordiazePOXIDE (LIBRIUM) capsule 50 mg  50 mg Oral TID PRN Priyanka Thomas DO        cloNIDine (CATAPRES) tablet 0.1 mg  0.1 mg Oral Q6H PRN Priyanka Thomas DO        dextrose (D50W) (25 g/50 mL) IV injection 25 g  25 g Intravenous Q15 Min PRN Priyanka Thomas DO        dextrose (GLUTOSE) oral gel 15 g  15 g Oral Q15 Min PRN Priyanka Thomas DO        glucagon HCl (Diagnostic) injection 1 mg  1 mg Subcutaneous Q15 Min PRN Priyanka Thomas DO        heparin  (porcine) 5000 UNIT/ML injection 5,000 Units  5,000 Units Subcutaneous Q12H Priyanka Thomas DO        lisinopril (PRINIVIL,ZESTRIL) tablet 10 mg  10 mg Oral Q24H Priyanka Thomas DO        LORazepam (ATIVAN) tablet 1 mg  1 mg Oral Q1H PRN Priyanka Thomas DO        Or    LORazepam (ATIVAN) injection 1 mg  1 mg Intravenous Q1H PRN Priyanka Thomas DO        Or    LORazepam (ATIVAN) tablet 2 mg  2 mg Oral Q1H PRN Priyanka Thomas DO        Or    LORazepam (ATIVAN) injection 2 mg  2 mg Intravenous Q1H PRN Priyanka Thomas DO        Or    LORazepam (ATIVAN) injection 2 mg  2 mg Intravenous Q15 Min PRN Priyanka Thomas DO   2 mg at 04/01/24 0535    Or    LORazepam (ATIVAN) injection 2 mg  2 mg Intramuscular Q15 Min PRN Priyanka Thomas DO        Or    LORazepam (ATIVAN) tablet 4 mg  4 mg Oral Q1H PRN Priyanka Thomas DO        Or    LORazepam (ATIVAN) injection 4 mg  4 mg Intravenous Q1H PRN Priyanka Thomas DO        [COMPLETED] LORazepam (ATIVAN) injection 2 mg  2 mg Intramuscular Once Bakari Chawla MD   2 mg at 04/01/24 0055    Magnesium Standard Dose Replacement - Follow Nurse / BPA Driven Protocol   Does not apply PRN Priyanka Thomas DO        nicotine polacrilex (NICORETTE) gum 4 mg  4 mg Mouth/Throat Q1H PRN Priyanka Thomas DO        ondansetron (ZOFRAN) injection 4 mg  4 mg Intravenous Q6H PRN Priyanka Thomas DO        pantoprazole (PROTONIX) injection 40 mg  40 mg Intravenous Q AM Priyanka Thomas DO   40 mg at 04/01/24 0550    sodium chloride 0.9 % flush 10 mL  10 mL Intravenous Q12H Priyanka Thomas DO        sodium chloride 0.9 % flush 10 mL  10 mL Intravenous PRN Priyanka Thomas DO        sodium chloride 0.9 % infusion 40 mL  40 mL Intravenous PRN Priyanka Thomas DO        thiamine (B-1) 100 mg, folic acid 1 mg in sodium chloride 0.9 % 1,000 mL infusion  100 mL/hr Intravenous Daily Priyanka Thomsa,          "thiamine (B-1) 500 mg in sodium chloride 0.9 % 100 mL IVPB  500 mg Intravenous Q8H Priyanka Thomas DO        Followed by    [START ON 4/4/2024] thiamine (B-1) injection 200 mg  200 mg Intravenous Q8H Priyanka Thomas DO        Followed by    [START ON 4/8/2024] thiamine (VITAMIN B-1) tablet 100 mg  100 mg Oral Daily Priyanka Thomas DO         Orders (all)        Start     Ordered    04/08/24 0900  thiamine (VITAMIN B-1) tablet 100 mg  Daily        Placed in \"Followed by\" Linked Group    04/01/24 0414 04/04/24 0900  chlordiazePOXIDE (LIBRIUM) capsule 10 mg  Once         04/01/24 0534    04/04/24 0600  thiamine (B-1) injection 200 mg  Every 8 Hours Scheduled        Placed in \"Followed by\" Linked Group    04/01/24 0414 04/03/24 0900  chlordiazePOXIDE (LIBRIUM) capsule 10 mg  2 Times Daily         04/01/24 0534    04/02/24 0900  chlordiazePOXIDE (LIBRIUM) capsule 25 mg  2 Times Daily         04/01/24 0534    04/02/24 0500  LORazepam (ATIVAN) injection 1 mg  Every 6 Hours,   Status:  Discontinued        Placed in \"Followed by\" Linked Group    04/01/24 0414 04/01/24 0900  thiamine (B-1) 100 mg, folic acid 1 mg in sodium chloride 0.9 % 1,000 mL infusion  Daily         04/01/24 0414 04/01/24 0900  sodium chloride 0.9 % flush 10 mL  Every 12 Hours Scheduled         04/01/24 0414 04/01/24 0900  sennosides-docusate (PERICOLACE) 8.6-50 MG per tablet 2 tablet  2 Times Daily        Placed in \"And\" Linked Group    04/01/24 0414 04/01/24 0900  heparin (porcine) 5000 UNIT/ML injection 5,000 Units  Every 12 Hours Scheduled         04/01/24 0414 04/01/24 0900  lisinopril (PRINIVIL,ZESTRIL) tablet 10 mg  Every 24 Hours Scheduled         04/01/24 0534 04/01/24 0630  chlordiazePOXIDE (LIBRIUM) capsule 25 mg  Every 8 Hours Scheduled         04/01/24 0534    04/01/24 0600  thiamine (B-1) 500 mg in sodium chloride 0.9 % 100 mL IVPB  Every 8 Hours Scheduled        Placed in \"Followed by\" Linked Group " "   04/01/24 0414    04/01/24 0600  pantoprazole (PROTONIX) injection 40 mg  Every Early Morning         04/01/24 0414    04/01/24 0600  chlordiazePOXIDE (LIBRIUM) capsule 50 mg  3 Times Daily PRN         04/01/24 0534    04/01/24 0535  Initiate Protocol: Chlordiazepoxide (Librium) Opiate Detoxification  Once         04/01/24 0534 04/01/24 0535  Notify Provider - Detox Protocol  Continuous        Comments: Open Order Report to View Parameters Requiring Provider Notification    04/01/24 0534 04/01/24 0535  If Patient is Demonstrating Distress / Withdrawal Symptoms 4 Hours After Chlordiazepoxide Scheduled Dose Administration, Evaluate Patient by Completing Clinical [place] WIthdrawal Assessment and Vital Signs  Every Shift       04/01/24 0534 04/01/24 0535  Vital Signs & CIWA - 4 Hours After Chlordiazepoxide Scheduled Dose Administration  Per Hospital Policy         04/01/24 0534 04/01/24 0535  Assess for Signs / Symptoms of Withdrawal (Motor Restlessness, Vomiting, Tremor, Elevated Heart Rate / Blood Pressure, Insomnia, Fever, Hallucinations, Disorientation, etc)  Per Hospital Policy         04/01/24 0534    04/01/24 0533  cloNIDine (CATAPRES) tablet 0.1 mg  Every 6 Hours PRN         04/01/24 0533    04/01/24 0500  LORazepam (ATIVAN) injection 2 mg  Every 6 Hours,   Status:  Discontinued        Placed in \"Followed by\" Linked Group    04/01/24 0414    04/01/24 0500  Intake & Output  Every Hour       04/01/24 0414    04/01/24 0415  Daily Weights  Daily       04/01/24 0414    04/01/24 0413  ondansetron (ZOFRAN) injection 4 mg  Every 6 Hours PRN         04/01/24 0414    04/01/24 0413  dextrose (GLUTOSE) oral gel 15 g  Every 15 Minutes PRN         04/01/24 0414    04/01/24 0413  dextrose (D50W) (25 g/50 mL) IV injection 25 g  Every 15 Minutes PRN         04/01/24 0414    04/01/24 0413  glucagon HCl (Diagnostic) injection 1 mg  Every 15 Minutes PRN         04/01/24 0414    04/01/24 0413  Code Status and " "Medical Interventions:  Continuous         04/01/24 0414    04/01/24 0413  Diet: Regular/House; Fluid Consistency: Thin (IDDSI 0)  Diet Effective Now         04/01/24 0414    04/01/24 0412  Height & Weight  Once         04/01/24 0414    04/01/24 0412  Oral Care - Patient Not on NPPV & Not Intubated  Every Shift       04/01/24 0414    04/01/24 0412  Target Arousal Level RASS 0 to -1  Continuous         04/01/24 0414    04/01/24 0412  Use Mobility Guidelines for Advancement of Activity  Continuous         04/01/24 0414    04/01/24 0412  Insert Peripheral IV  Once         04/01/24 0414    04/01/24 0412  Saline Lock & Maintain IV Access  Continuous         04/01/24 0414    04/01/24 0412  Inpatient Admission  Once         04/01/24 0414    04/01/24 0411  sodium chloride 0.9 % flush 10 mL  As Needed         04/01/24 0414    04/01/24 0411  sodium chloride 0.9 % infusion 40 mL  As Needed         04/01/24 0414    04/01/24 0411  polyethylene glycol (MIRALAX) packet 17 g  Daily PRN        Placed in \"And\" Linked Group    04/01/24 0414    04/01/24 0411  bisacodyl (DULCOLAX) EC tablet 5 mg  Daily PRN        Placed in \"And\" Linked Group    04/01/24 0414    04/01/24 0411  bisacodyl (DULCOLAX) suppository 10 mg  Daily PRN        Placed in \"And\" Linked Group    04/01/24 0414    04/01/24 0411  Fall Precautions  Continuous         04/01/24 0414    04/01/24 0410  nicotine polacrilex (NICORETTE) gum 4 mg  Every 1 Hour PRN         04/01/24 0414    04/01/24 0410  LORazepam (ATIVAN) tablet 1 mg  Every 1 Hour PRN        Placed in \"Or\" Linked Group    04/01/24 0414    04/01/24 0410  LORazepam (ATIVAN) injection 1 mg  Every 1 Hour PRN        Placed in \"Or\" Linked Group    04/01/24 0414    04/01/24 0410  LORazepam (ATIVAN) tablet 2 mg  Every 1 Hour PRN        Placed in \"Or\" Linked Group    04/01/24 0414    04/01/24 0410  LORazepam (ATIVAN) injection 2 mg  Every 1 Hour PRN        Placed in \"Or\" Linked Group    04/01/24 0414 04/01/24 0410  " "LORazepam (ATIVAN) injection 2 mg  Every 15 Minutes PRN        Placed in \"Or\" Linked Group    04/01/24 0414    04/01/24 0410  LORazepam (ATIVAN) injection 2 mg  Every 15 Minutes PRN        Placed in \"Or\" Linked Group    04/01/24 0414    04/01/24 0410  LORazepam (ATIVAN) tablet 4 mg  Every 1 Hour PRN        Placed in \"Or\" Linked Group    04/01/24 0414    04/01/24 0410  LORazepam (ATIVAN) injection 4 mg  Every 1 Hour PRN        Placed in \"Or\" Linked Group    04/01/24 0414    04/01/24 0410  Initiate Alcohol Withdrawal Protocol  Once         04/01/24 0414    04/01/24 0410  Vital Signs  Per Hospital Policy         04/01/24 0414    04/01/24 0410  Continuous Pulse Oximetry  Continuous         04/01/24 0414 04/01/24 0410  Obtain Baseline Clinical [place] Withdrawal Assessment - Ar (CIWA-Ar), Sedation Scale & Vital Signs  Once        Comments: Follow CIWA Scoring Reference Guide    04/01/24 0414    04/01/24 0410  Clinical [place] Withdrawal Assessment (CIWA-Ar)  Per Hospital Policy         04/01/24 0414    04/01/24 0410  If CIWA-Ar Score Less Than 8 For 3 Consecutive Assessments, Monitor Every 4 Hours & Discontinue Assessment When CIWA-Ar Less Than 8 for 24 Hours  Per Hospital Policy        Comments: Contact Provider to Restart Protocol if Any Symptoms Reappear    04/01/24 0414    04/01/24 0410  Seizure Precautions  Continuous         04/01/24 0414    04/01/24 0410  Notify Provider - Withdrawal  Continuous        Comments: Open Order Report to View Parameters Requiring Provider Notification    04/01/24 0414    04/01/24 0410  Notify Provider of Abnormal Lab Results  Continuous        Comments: Open Order Report to View Parameters Requiring Provider Notification    04/01/24 0414    04/01/24 0410  Notify Provider - Vitals  Continuous        Comments: Open Order Report to View Parameters Requiring Provider Notification    04/01/24 0414    04/01/24 0410  Safety Precautions  Continuous         04/01/24 0414    04/01/24 0409 "  Magnesium Standard Dose Replacement - Follow Nurse / BPA Driven Protocol  As Needed         04/01/24 0414    Unscheduled  Obtain Pre & Post Sedation Scores With Every Lorazepam Dose - Hold For POSS Greater Than 2 or RASS of -3 or Less  As Needed       04/01/24 0414    Unscheduled  POC Glucose Finger PRN  As Needed      Comments: Complete no more than 45 minutes prior to patient eating      04/01/24 0414    Unscheduled  Follow Hypoglycemia Standing Orders For Blood Glucose <70 & Notify Provider of Treatment  As Needed      Comments: Follow Hypoglycemia Orders As Outlined in Process Instructions (Open Order Report to View Full Instructions)  Notify Provider Any Time Hypoglycemia Treatment is Administered    04/01/24 0414    Unscheduled  Clinical [place] Withdrawal Assessment - As Needed  As Needed       04/01/24 0534    Unscheduled  Check Patient's Mouth After Administration to Verify Medication is Swallowed  As Needed       04/01/24 0534    --  acetaminophen (TYLENOL) 650 MG 8 hr tablet  Every 8 Hours PRN         04/01/24 0434    --  folic acid (FOLVITE) 1 MG tablet  Daily         04/01/24 0434    --  magnesium oxide (MAG-OX) 400 MG tablet  Daily         04/01/24 0434    --  meloxicam (MOBIC) 15 MG tablet  Daily         04/01/24 0434    --  methocarbamol (ROBAXIN) 500 MG tablet  3 Times Daily PRN         04/01/24 0434    --  multivitamin tablet tablet  Daily         04/01/24 0434    --  naloxone (Narcan) 4 MG/0.1ML nasal spray  As Needed         04/01/24 0434    --  naproxen (NAPROSYN) 500 MG tablet  2 Times Daily PRN         04/01/24 0434    --  thiamine (VITAMIN B-1) 100 MG tablet  tablet  Daily         04/01/24 0434    Pending  amitriptyline (ELAVIL) tablet 50 mg  Nightly         Pending    Pending  busPIRone (BUSPAR) tablet 10 mg  2 Times Daily         Pending    Pending  Cariprazine HCl (VRAYLAR) capsule capsule 1.5 mg  Daily         Pending    Pending  DULoxetine (CYMBALTA) DR capsule 60 mg  Daily          Pending    Pending  budesonide-formoterol (SYMBICORT) 160-4.5 MCG/ACT inhaler 2 puff  2 Times Daily - RT         Pending    Pending  lisinopril (PRINIVIL,ZESTRIL) tablet 20 mg  Daily         Pending    Pending  magnesium oxide (MAG-OX) tablet 400 mg  Daily         Pending    Pending  methocarbamol (ROBAXIN) tablet 500 mg  3 Times Daily PRN         Pending    Pending  multivitamin (THERAGRAN) tablet 1 tablet  Daily         Pending    Pending  tiotropium (SPIRIVA RESPIMAT) 2.5 mcg/act aerosol solution inhaler  Daily - RT         Pending    Pending  QUEtiapine (SEROquel) tablet 50 mg  3 Times Daily         Pending    Pending  prazosin (MINIPRESS) capsule 2 mg  Nightly         Pending    Pending  pantoprazole (PROTONIX) EC tablet 40 mg  Daily         Pending                     Physician Progress Notes (all)        Progress Notes signed by Trell Sandoval MD at 04/01/24 0539           [facility] Physician - Brief Progress Note  PERMANENT  04/01/2024 05:38    [facility] - CCU - 10 - [place], [place] ([facility])    TRELL BUSTILLO    Date of Service 04/01/2024 05:38    HPI/Events of Note [facility] Provider Assessment Note  Brief HPI:   53M admitted for detox.  Developed DTs refractory to lower levels of ativan as typical on the psych service and was transfereed to ICU for further management.  Past medical history includes but not limited to:Alcoholism  Video Exam:   Awake, room air, no distress.  155/87, 87, 94%, 14.  Calm and communicative.  Labs:    Reviewed  Plan includes:     [facility] protocol  Nutritional/Vitamin supplementation  _x___   Video Assessment performed  __x___   Most recent labs reviewed  __x___   Vital Signs reviewed  __x___   Best Practices addressed:                 VTE prophylaxis:          SQH                 SUP (when indicated):  PPI     _x___      Spoke with bedside RN - has orders from bedside physician.  No HH needs.  Please update [facility] with any needs or concerns.      [facility] Provider Assessment Note  Brief HPI:   53M admitted for detox.  Developed DTs refractory to lower levels of ativan as typical on the psych service and was transfereed to ICU for further management.  Past medical history includes but not limited to:  Video Exam:   Awake, room air, no distress.  155/87, 87, 94%, 14.  Calm and communicative.  Labs:    Reviewed  Plan includes:     CIWA protocol  Nutritional/Vitamin supplementation  _x___   Video Assessment performed  __x___   Most recent labs reviewed  __x___   Vital Signs reviewed  __x___   Best Practices addressed:                 VTE prophylaxis:          SQH                 SUP (when indicated):  PPI     _x___      Spoke with bedside RN - has orders from bedside physician.  No HH needs.  Please update [facility] with any needs or concerns.      Interventions Major-Other: DTs        Electronically Signed by: Leonard Sandoval) on 04/01/2024 05:39    Electronically signed by Leonard Sandoval MD at 04/01/24 0539

## 2024-04-01 NOTE — CASE MANAGEMENT/SOCIAL WORK
Discharge Planning Assessment   [facility]     Patient Name: Leonard Crandall  MRN: [id number]  Today's Date: 4/1/2024    Admit Date: 4/1/2024    Plan: SS received a consult for d/c planning. SS and CM attempted to speak with pt at bedside on this date. Pt was not able to stay awake and answer questions appropriately at this time. SS to follow up with pt tomorrow 4/2/24. SS to follow and assist with discharge planning.   Discharge Plan       Row Name 04/01/24 8209       Plan    Plan SS received a consult for d/c planning. SS and CM attempted to speak with pt at bedside on this date. Pt was not able to stay awake and answer questions appropriately at this time. SS to follow up with pt tomorrow 4/2/24. SS to follow and assist with discharge planning.    Patient/Family in Agreement with Plan yes        Expected Discharge Date and Time       Expected Discharge Date Expected Discharge Time    Apr 3, 2024            Demographic Summary       Row Name 04/01/24 1720       General Information    Admission Type inpatient    Referral Source nursing    Reason for Consult discharge planning  SS received a consult for d/c planning.            KAITLYNN Cowart
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
## 2024-04-01 NOTE — PLAN OF CARE
Goal Outcome Evaluation:  Plan of Care Reviewed With: patient        Progress: no change         Problem: Adult Inpatient Plan of Care  Goal: Plan of Care Review  Outcome: Ongoing, Progressing  Flowsheets (Taken 4/1/2024 0611)  Progress: no change  Plan of Care Reviewed With: patient  Goal: Patient-Specific Goal (Individualized)  Outcome: Ongoing, Progressing  Goal: Absence of Hospital-Acquired Illness or Injury  Outcome: Ongoing, Progressing  Intervention: Identify and Manage Fall Risk  Recent Flowsheet Documentation  Taken 4/1/2024 0600 by Nighat Stevens RN  Safety Promotion/Fall Prevention:   safety round/check completed   fall prevention program maintained  Taken 4/1/2024 0500 by Nighat Stevens RN  Safety Promotion/Fall Prevention:   safety round/check completed   fall prevention program maintained  Taken 4/1/2024 0436 by Nighat Stevens RN  Safety Promotion/Fall Prevention:   safety round/check completed   fall prevention program maintained  Intervention: Prevent Skin Injury  Recent Flowsheet Documentation  Taken 4/1/2024 0600 by Nighat Stevens RN  Body Position: position changed independently  Taken 4/1/2024 0436 by Nighat Stevens RN  Body Position: position changed independently  Intervention: Prevent and Manage VTE (Venous Thromboembolism) Risk  Recent Flowsheet Documentation  Taken 4/1/2024 0436 by Nighat Stevens RN  Activity Management: bedrest  Range of Motion: active ROM (range of motion) encouraged  Goal: Optimal Comfort and Wellbeing  Outcome: Ongoing, Progressing  Intervention: Provide Person-Centered Care  Recent Flowsheet Documentation  Taken 4/1/2024 0436 by Nighat Stevens RN  Trust Relationship/Rapport:   care explained   choices provided   reassurance provided   thoughts/feelings acknowledged  Goal: Readiness for Transition of Care  Outcome: Ongoing, Progressing

## 2024-04-01 NOTE — NURSING NOTE
Pt doesn't not have a verbal release signed for his emergency contact, unable to notify in regards to his transfer to CCU, Lead Maryellen DESHPANDE made aware also.

## 2024-04-01 NOTE — NURSING NOTE
Notified Dr. Chawla that patient has has received a total of 4 doses of the ativan 2 mg so far tonight, he appears to have gotten worse with each dose he has received. Patient is hallucinating more, responding to internal stimuli, confused with place/time/situation, pt is extremely restless wanting to get out of bed. Sitter has remained at bedside. CIWA score 21 , vital signs /84, HR 97 and O2 97%.     RBTOx2 per Dr. Chawla:  CBC and CMP STAT   2mg Ativan IM ONCE STAT   Change frequency of ativan 2mg PO order to ativan 2mg PO every 20 mins prn     Nursing communication order:   Per Dr. Chawla if patient is unable to tolerate ativan 2mg PO and vomits, give ativan 2mg IM ONCE.    Nursing communication order:   Per Dr. Chawla if patient were to have seizure activity give ativan 2mg IM q2hrs prn and if seizure activity doesn't subside in 60 seconds give another dose of ativan 2mg IM ONCE.

## 2024-04-02 ENCOUNTER — HOSPITAL ENCOUNTER (INPATIENT)
Facility: HOSPITAL | Age: 54
LOS: 2 days | Discharge: HOME | DRG: 897 | End: 2024-04-04
Attending: PSYCHIATRY & NEUROLOGY | Admitting: PSYCHIATRY & NEUROLOGY
Payer: MEDICAID

## 2024-04-02 VITALS
HEIGHT: 74 IN | SYSTOLIC BLOOD PRESSURE: 124 MMHG | WEIGHT: 258.38 LBS | RESPIRATION RATE: 16 BRPM | HEART RATE: 92 BPM | OXYGEN SATURATION: 98 % | TEMPERATURE: 98 F | DIASTOLIC BLOOD PRESSURE: 95 MMHG | BODY MASS INDEX: 33.16 KG/M2

## 2024-04-02 PROBLEM — F10.10 ALCOHOL ABUSE: Status: ACTIVE | Noted: 2024-04-02

## 2024-04-02 LAB
ALBUMIN SERPL-MCNC: 3.6 G/DL (ref 3.5–5.2)
ALBUMIN/GLOB SERPL: 1.7 G/DL
ALP SERPL-CCNC: 64 U/L (ref 39–117)
ALT SERPL W P-5'-P-CCNC: 26 U/L (ref 1–41)
ANION GAP SERPL CALCULATED.3IONS-SCNC: 9.1 MMOL/L (ref 5–15)
AST SERPL-CCNC: 26 U/L (ref 1–40)
BASOPHILS # BLD AUTO: 0.05 10*3/MM3 (ref 0–0.2)
BASOPHILS NFR BLD AUTO: 0.6 % (ref 0–1.5)
BILIRUB SERPL-MCNC: 0.2 MG/DL (ref 0–1.2)
BUN SERPL-MCNC: 12 MG/DL (ref 6–20)
BUN/CREAT SERPL: 13.8 (ref 7–25)
CALCIUM SPEC-SCNC: 8.6 MG/DL (ref 8.6–10.5)
CHLORIDE SERPL-SCNC: 103 MMOL/L (ref 98–107)
CO2 SERPL-SCNC: 23.9 MMOL/L (ref 22–29)
CREAT SERPL-MCNC: 0.87 MG/DL (ref 0.76–1.27)
DEPRECATED RDW RBC AUTO: 48.8 FL (ref 37–54)
EGFRCR SERPLBLD CKD-EPI 2021: 104.5 ML/MIN/1.73
EOSINOPHIL # BLD AUTO: 0.55 10*3/MM3 (ref 0–0.4)
EOSINOPHIL NFR BLD AUTO: 6.7 % (ref 0.3–6.2)
ERYTHROCYTE [DISTWIDTH] IN BLOOD BY AUTOMATED COUNT: 13.6 % (ref 12.3–15.4)
GLOBULIN UR ELPH-MCNC: 2.1 GM/DL
GLUCOSE SERPL-MCNC: 91 MG/DL (ref 65–99)
HAV IGM SERPL QL IA: NORMAL
HBV CORE IGM SERPL QL IA: NORMAL
HBV SURFACE AG SERPL QL IA: NORMAL
HCT VFR BLD AUTO: 44.4 % (ref 37.5–51)
HCV AB SER DONR QL: NORMAL
HGB BLD-MCNC: 14.8 G/DL (ref 13–17.7)
IMM GRANULOCYTES # BLD AUTO: 0.02 10*3/MM3 (ref 0–0.05)
IMM GRANULOCYTES NFR BLD AUTO: 0.2 % (ref 0–0.5)
LYMPHOCYTES # BLD AUTO: 2.63 10*3/MM3 (ref 0.7–3.1)
LYMPHOCYTES NFR BLD AUTO: 32.1 % (ref 19.6–45.3)
MCH RBC QN AUTO: 32.2 PG (ref 26.6–33)
MCHC RBC AUTO-ENTMCNC: 33.3 G/DL (ref 31.5–35.7)
MCV RBC AUTO: 96.5 FL (ref 79–97)
MONOCYTES # BLD AUTO: 0.7 10*3/MM3 (ref 0.1–0.9)
MONOCYTES NFR BLD AUTO: 8.5 % (ref 5–12)
NEUTROPHILS NFR BLD AUTO: 4.25 10*3/MM3 (ref 1.7–7)
NEUTROPHILS NFR BLD AUTO: 51.9 % (ref 42.7–76)
NRBC BLD AUTO-RTO: 0 /100 WBC (ref 0–0.2)
PLATELET # BLD AUTO: 197 10*3/MM3 (ref 140–450)
PMV BLD AUTO: 9.2 FL (ref 6–12)
POTASSIUM SERPL-SCNC: 4.2 MMOL/L (ref 3.5–5.2)
PROT SERPL-MCNC: 5.7 G/DL (ref 6–8.5)
RBC # BLD AUTO: 4.6 10*6/MM3 (ref 4.14–5.8)
SODIUM SERPL-SCNC: 136 MMOL/L (ref 136–145)
WBC NRBC COR # BLD AUTO: 8.2 10*3/MM3 (ref 3.4–10.8)

## 2024-04-02 PROCEDURE — 80053 COMPREHEN METABOLIC PANEL: CPT | Performed by: INTERNAL MEDICINE

## 2024-04-02 PROCEDURE — 80074 ACUTE HEPATITIS PANEL: CPT | Performed by: PSYCHIATRY & NEUROLOGY

## 2024-04-02 PROCEDURE — 94799 UNLISTED PULMONARY SVC/PX: CPT

## 2024-04-02 PROCEDURE — 94761 N-INVAS EAR/PLS OXIMETRY MLT: CPT

## 2024-04-02 PROCEDURE — 99239 HOSP IP/OBS DSCHRG MGMT >30: CPT | Performed by: INTERNAL MEDICINE

## 2024-04-02 PROCEDURE — 25810000003 SODIUM CHLORIDE 0.9 % SOLUTION 1,000 ML FLEX CONT: Performed by: INTERNAL MEDICINE

## 2024-04-02 PROCEDURE — 85025 COMPLETE CBC W/AUTO DIFF WBC: CPT | Performed by: INTERNAL MEDICINE

## 2024-04-02 PROCEDURE — 25010000002 THIAMINE HCL 200 MG/2ML SOLUTION 2 ML VIAL: Performed by: INTERNAL MEDICINE

## 2024-04-02 PROCEDURE — 94664 DEMO&/EVAL PT USE INHALER: CPT

## 2024-04-02 PROCEDURE — HZ2ZZZZ DETOXIFICATION SERVICES FOR SUBSTANCE ABUSE TREATMENT: ICD-10-PCS | Performed by: PSYCHIATRY & NEUROLOGY

## 2024-04-02 PROCEDURE — 25010000002 HEPARIN (PORCINE) PER 1000 UNITS: Performed by: INTERNAL MEDICINE

## 2024-04-02 PROCEDURE — 93005 ELECTROCARDIOGRAM TRACING: CPT | Performed by: PSYCHIATRY & NEUROLOGY

## 2024-04-02 PROCEDURE — 93010 ELECTROCARDIOGRAM REPORT: CPT | Performed by: SPECIALIST

## 2024-04-02 RX ORDER — ECHINACEA PURPUREA EXTRACT 125 MG
2 TABLET ORAL AS NEEDED
Status: DISCONTINUED | OUTPATIENT
Start: 2024-04-02 | End: 2024-04-04 | Stop reason: HOSPADM

## 2024-04-02 RX ORDER — CHLORDIAZEPOXIDE HYDROCHLORIDE 10 MG/1
10 CAPSULE, GELATIN COATED ORAL 2 TIMES DAILY
Status: COMPLETED | OUTPATIENT
Start: 2024-04-03 | End: 2024-04-03

## 2024-04-02 RX ORDER — BUSPIRONE HYDROCHLORIDE 10 MG/1
10 TABLET ORAL 2 TIMES DAILY
Status: DISCONTINUED | OUTPATIENT
Start: 2024-04-02 | End: 2024-04-04 | Stop reason: HOSPADM

## 2024-04-02 RX ORDER — PRAZOSIN HYDROCHLORIDE 1 MG/1
2 CAPSULE ORAL NIGHTLY
Status: DISCONTINUED | OUTPATIENT
Start: 2024-04-02 | End: 2024-04-04 | Stop reason: HOSPADM

## 2024-04-02 RX ORDER — LISINOPRIL 10 MG/1
20 TABLET ORAL
Status: DISCONTINUED | OUTPATIENT
Start: 2024-04-02 | End: 2024-04-02 | Stop reason: HOSPADM

## 2024-04-02 RX ORDER — BENZTROPINE MESYLATE 1 MG/1
2 TABLET ORAL ONCE AS NEEDED
Status: DISCONTINUED | OUTPATIENT
Start: 2024-04-02 | End: 2024-04-04 | Stop reason: HOSPADM

## 2024-04-02 RX ORDER — CHLORDIAZEPOXIDE HYDROCHLORIDE 10 MG/1
10 CAPSULE, GELATIN COATED ORAL ONCE
Status: ON HOLD
Start: 2024-04-04 | End: 2024-04-02

## 2024-04-02 RX ORDER — DULOXETIN HYDROCHLORIDE 60 MG/1
60 CAPSULE, DELAYED RELEASE ORAL DAILY
Status: DISCONTINUED | OUTPATIENT
Start: 2024-04-03 | End: 2024-04-04 | Stop reason: HOSPADM

## 2024-04-02 RX ORDER — ALUMINA, MAGNESIA, AND SIMETHICONE 2400; 2400; 240 MG/30ML; MG/30ML; MG/30ML
15 SUSPENSION ORAL EVERY 6 HOURS PRN
Status: DISCONTINUED | OUTPATIENT
Start: 2024-04-02 | End: 2024-04-04 | Stop reason: HOSPADM

## 2024-04-02 RX ORDER — LORAZEPAM 1 MG/1
1 TABLET ORAL EVERY 4 HOURS PRN
Status: ACTIVE | OUTPATIENT
Start: 2024-04-03 | End: 2024-04-04

## 2024-04-02 RX ORDER — CHLORDIAZEPOXIDE HYDROCHLORIDE 10 MG/1
10 CAPSULE, GELATIN COATED ORAL 2 TIMES DAILY
Status: ON HOLD
Start: 2024-04-03 | End: 2024-04-02

## 2024-04-02 RX ORDER — CHLORDIAZEPOXIDE HYDROCHLORIDE 25 MG/1
50 CAPSULE, GELATIN COATED ORAL 3 TIMES DAILY PRN
Status: ON HOLD
Start: 2024-04-02 | End: 2024-04-02

## 2024-04-02 RX ORDER — CHLORDIAZEPOXIDE HYDROCHLORIDE 25 MG/1
25 CAPSULE, GELATIN COATED ORAL 2 TIMES DAILY
Status: ON HOLD
Start: 2024-04-02 | End: 2024-04-02

## 2024-04-02 RX ORDER — CHLORDIAZEPOXIDE HYDROCHLORIDE 25 MG/1
25 CAPSULE, GELATIN COATED ORAL 2 TIMES DAILY
Status: COMPLETED | OUTPATIENT
Start: 2024-04-02 | End: 2024-04-02

## 2024-04-02 RX ORDER — LOPERAMIDE HYDROCHLORIDE 2 MG/1
2 CAPSULE ORAL
Status: DISCONTINUED | OUTPATIENT
Start: 2024-04-02 | End: 2024-04-04 | Stop reason: HOSPADM

## 2024-04-02 RX ORDER — FAMOTIDINE 20 MG/1
20 TABLET, FILM COATED ORAL 2 TIMES DAILY PRN
Status: DISCONTINUED | OUTPATIENT
Start: 2024-04-02 | End: 2024-04-04 | Stop reason: HOSPADM

## 2024-04-02 RX ORDER — LORAZEPAM 1 MG/1
1 TABLET ORAL
Qty: 2 TABLET | Refills: 0 | Status: COMPLETED | OUTPATIENT
Start: 2024-04-02 | End: 2024-04-02

## 2024-04-02 RX ORDER — BENZTROPINE MESYLATE 1 MG/ML
1 INJECTION INTRAMUSCULAR; INTRAVENOUS ONCE AS NEEDED
Status: DISCONTINUED | OUTPATIENT
Start: 2024-04-02 | End: 2024-04-04 | Stop reason: HOSPADM

## 2024-04-02 RX ORDER — LORAZEPAM 0.5 MG/1
0.5 TABLET ORAL
Qty: 3 TABLET | Refills: 0 | Status: COMPLETED | OUTPATIENT
Start: 2024-04-03 | End: 2024-04-03

## 2024-04-02 RX ORDER — LORAZEPAM 0.5 MG/1
0.5 TABLET ORAL EVERY 4 HOURS PRN
Status: DISCONTINUED | OUTPATIENT
Start: 2024-04-04 | End: 2024-04-04 | Stop reason: HOSPADM

## 2024-04-02 RX ORDER — TRAZODONE HYDROCHLORIDE 50 MG/1
50 TABLET ORAL NIGHTLY PRN
Status: DISCONTINUED | OUTPATIENT
Start: 2024-04-02 | End: 2024-04-04 | Stop reason: HOSPADM

## 2024-04-02 RX ORDER — LORAZEPAM 2 MG/1
2 TABLET ORAL
Status: ACTIVE | OUTPATIENT
Start: 2024-04-02 | End: 2024-04-03

## 2024-04-02 RX ORDER — BENZONATATE 100 MG/1
100 CAPSULE ORAL 3 TIMES DAILY PRN
Status: DISCONTINUED | OUTPATIENT
Start: 2024-04-02 | End: 2024-04-04 | Stop reason: HOSPADM

## 2024-04-02 RX ORDER — NICOTINE 21 MG/24HR
1 PATCH, TRANSDERMAL 24 HOURS TRANSDERMAL
Status: DISCONTINUED | OUTPATIENT
Start: 2024-04-02 | End: 2024-04-04 | Stop reason: HOSPADM

## 2024-04-02 RX ORDER — MULTIVITAMIN WITH IRON
2 TABLET ORAL DAILY
Status: DISCONTINUED | OUTPATIENT
Start: 2024-04-02 | End: 2024-04-04 | Stop reason: HOSPADM

## 2024-04-02 RX ORDER — PANTOPRAZOLE SODIUM 40 MG/1
40 TABLET, DELAYED RELEASE ORAL
Status: DISCONTINUED | OUTPATIENT
Start: 2024-04-03 | End: 2024-04-04 | Stop reason: HOSPADM

## 2024-04-02 RX ORDER — ACETAMINOPHEN 325 MG/1
650 TABLET ORAL EVERY 6 HOURS PRN
Status: DISCONTINUED | OUTPATIENT
Start: 2024-04-02 | End: 2024-04-04 | Stop reason: HOSPADM

## 2024-04-02 RX ORDER — LISINOPRIL 10 MG/1
20 TABLET ORAL
Status: DISCONTINUED | OUTPATIENT
Start: 2024-04-03 | End: 2024-04-04 | Stop reason: HOSPADM

## 2024-04-02 RX ORDER — BUDESONIDE AND FORMOTEROL FUMARATE DIHYDRATE 160; 4.5 UG/1; UG/1
2 AEROSOL RESPIRATORY (INHALATION)
Status: DISCONTINUED | OUTPATIENT
Start: 2024-04-02 | End: 2024-04-04 | Stop reason: HOSPADM

## 2024-04-02 RX ORDER — MULTIPLE VITAMINS W/ MINERALS TAB 9MG-400MCG
1 TAB ORAL DAILY
Status: DISCONTINUED | OUTPATIENT
Start: 2024-04-02 | End: 2024-04-04 | Stop reason: HOSPADM

## 2024-04-02 RX ORDER — CLONIDINE HYDROCHLORIDE 0.1 MG/1
0.1 TABLET ORAL EVERY 6 HOURS PRN
Status: ON HOLD
Start: 2024-04-02 | End: 2024-04-02

## 2024-04-02 RX ORDER — ONDANSETRON 4 MG/1
4 TABLET, ORALLY DISINTEGRATING ORAL EVERY 6 HOURS PRN
Status: DISCONTINUED | OUTPATIENT
Start: 2024-04-02 | End: 2024-04-04 | Stop reason: HOSPADM

## 2024-04-02 RX ORDER — IBUPROFEN 400 MG/1
400 TABLET ORAL EVERY 6 HOURS PRN
Status: DISCONTINUED | OUTPATIENT
Start: 2024-04-02 | End: 2024-04-04 | Stop reason: HOSPADM

## 2024-04-02 RX ORDER — QUETIAPINE FUMARATE 100 MG/1
50 TABLET, FILM COATED ORAL 3 TIMES DAILY
Status: DISCONTINUED | OUTPATIENT
Start: 2024-04-02 | End: 2024-04-04 | Stop reason: HOSPADM

## 2024-04-02 RX ORDER — HYDROXYZINE 50 MG/1
50 TABLET, FILM COATED ORAL EVERY 6 HOURS PRN
Status: DISCONTINUED | OUTPATIENT
Start: 2024-04-02 | End: 2024-04-04 | Stop reason: HOSPADM

## 2024-04-02 RX ADMIN — QUETIAPINE FUMARATE 50 MG: 25 TABLET ORAL at 08:42

## 2024-04-02 RX ADMIN — CHLORDIAZEPOXIDE HYDROCHLORIDE 25 MG: 25 CAPSULE ORAL at 14:14

## 2024-04-02 RX ADMIN — LORAZEPAM 1 MG: 1 TABLET ORAL at 21:26

## 2024-04-02 RX ADMIN — PRAZOSIN HYDROCHLORIDE 2 MG: 1 CAPSULE ORAL at 21:26

## 2024-04-02 RX ADMIN — Medication 10 ML: at 08:43

## 2024-04-02 RX ADMIN — Medication 100 MG: at 14:15

## 2024-04-02 RX ADMIN — PANTOPRAZOLE SODIUM 40 MG: 40 TABLET, DELAYED RELEASE ORAL at 06:45

## 2024-04-02 RX ADMIN — THIAMINE HYDROCHLORIDE 500 MG: 100 INJECTION, SOLUTION INTRAMUSCULAR; INTRAVENOUS at 05:05

## 2024-04-02 RX ADMIN — BUSPIRONE HYDROCHLORIDE 10 MG: 10 TABLET ORAL at 21:26

## 2024-04-02 RX ADMIN — CARIPRAZINE 1.5 MG: 1.5 CAPSULE, GELATIN COATED ORAL at 08:41

## 2024-04-02 RX ADMIN — QUETIAPINE FUMARATE 50 MG: 100 TABLET ORAL at 21:26

## 2024-04-02 RX ADMIN — BUDESONIDE AND FORMOTEROL FUMARATE DIHYDRATE 2 PUFF: 160; 4.5 AEROSOL RESPIRATORY (INHALATION) at 06:38

## 2024-04-02 RX ADMIN — ACETAMINOPHEN 1000 MG: 500 TABLET ORAL at 05:19

## 2024-04-02 RX ADMIN — Medication 1 TABLET: at 08:42

## 2024-04-02 RX ADMIN — TIOTROPIUM BROMIDE INHALATION SPRAY 2 PUFF: 3.12 SPRAY, METERED RESPIRATORY (INHALATION) at 06:37

## 2024-04-02 RX ADMIN — HEPARIN SODIUM 5000 UNITS: 5000 INJECTION INTRAVENOUS; SUBCUTANEOUS at 08:41

## 2024-04-02 RX ADMIN — DULOXETINE HYDROCHLORIDE 60 MG: 60 CAPSULE, DELAYED RELEASE ORAL at 08:42

## 2024-04-02 RX ADMIN — LORAZEPAM 1 MG: 1 TABLET ORAL at 15:19

## 2024-04-02 RX ADMIN — MAGNESIUM GLUCONATE 500 MG ORAL TABLET 400 MG: 500 TABLET ORAL at 08:42

## 2024-04-02 RX ADMIN — DOCUSATE SODIUM 50 MG AND SENNOSIDES 8.6 MG 2 TABLET: 8.6; 5 TABLET, FILM COATED ORAL at 08:42

## 2024-04-02 RX ADMIN — QUETIAPINE FUMARATE 50 MG: 100 TABLET ORAL at 15:18

## 2024-04-02 RX ADMIN — Medication 2 TABLET: at 14:15

## 2024-04-02 RX ADMIN — LISINOPRIL 20 MG: 10 TABLET ORAL at 08:42

## 2024-04-02 RX ADMIN — THIAMINE HYDROCHLORIDE 100 ML/HR: 100 INJECTION, SOLUTION INTRAMUSCULAR; INTRAVENOUS at 08:37

## 2024-04-02 RX ADMIN — CLONIDINE HYDROCHLORIDE 0.1 MG: 0.1 TABLET ORAL at 06:49

## 2024-04-02 RX ADMIN — CHLORDIAZEPOXIDE HYDROCHLORIDE 25 MG: 25 CAPSULE ORAL at 08:42

## 2024-04-02 RX ADMIN — BUSPIRONE HYDROCHLORIDE 10 MG: 10 TABLET ORAL at 08:42

## 2024-04-02 RX ADMIN — Medication 1 TABLET: at 14:15

## 2024-04-02 RX ADMIN — CHLORDIAZEPOXIDE HYDROCHLORIDE 25 MG: 25 CAPSULE ORAL at 21:26

## 2024-04-02 NOTE — PLAN OF CARE
Goal Outcome Evaluation:  Plan of Care Reviewed With: patient  Patient Agreement with Plan of Care: agrees           New admission.  Care plan initiated.

## 2024-04-02 NOTE — CASE MANAGEMENT/SOCIAL WORK
Discharge Planning Assessment  [facility]     Patient Name: Leonard Crandall  MRN: [id number]  Today's Date: 4/2/2024    Admit Date: 4/1/2024     Discharge Plan       Row Name 04/02/24 1221       Plan    Final Discharge Disposition Code 65 - psychiatric hospital or unit    Final Note Pt is being discharged to [facility] on this date. No needs identified at this time.              KAITLYNN Cowart

## 2024-04-02 NOTE — DISCHARGE SUMMARY
[facility] HOSPITALISTS DISCHARGE SUMMARY    Patient Identification:  Name:  Leonard Crandall  Age:  53 y.o.  Sex:  male  :  1970  MRN:  [id number]  Visit Number:  [id number]    Date of Admission: 2024  Date of Discharge:  2024    PCP: Jhon Khoury PA-C    DISCHARGE DIAGNOSIS  #Severe alcohol use disorder with dependence  #Acute alcohol withdrawal with delirium tremens  #Uncontrolled essential hypertension due to above  #SIRS with tachycardia and RR 20 due to above  #Tobacco use disorder    Chronic medical conditions:  -Major depressive disorder  -COPD not in acute exacerbation  -GERD    CONSULTS   None     PROCEDURES PERFORMED  None     HOSPITAL COURSE  Patient is a 53 y.o. male presented on 3/28 to [facility] for alcohol detox and was transferred to CCU on  for worsening withdrawal  Please see the admitting history and physical for further details.      Mr. Crandall is our 52 yo M with hx alcoholism, anxiety, bipolar disorder, COPD, depression, tobacco use and hypertension who presented to inpatient detox on 3/28 for alcohol detox and transferred to CCU for increasing benzo requirement and hallucinations. Patient started on librium taper along with PRN ativan per CIWA. Patient also given IV thiamine while in unit. He has progressively improved with decreasing CIWA, ativan requirements, and was oriented without delusion on my evaluation this AM. I have increased his lisinopril to home dose of 20 mg daily with improvement in his BP. Can consider increasing to 40 mg if continues to be uncontrolled but suspect BP will improve as withdrawal improves. Patient noted preference to return to [facility] to continue detox. Patient graciously accepted by Dr. Mansfield to return to detox unit to continue his care.     VITAL SIGNS:  Temp:  [97 °F (36.1 °C)-98 °F (36.7 °C)] 98 °F (36.7 °C)  Heart Rate:  [] 77  Resp:  [14-19] 18  BP: (120-164)/() 129/81  SpO2:  [90 %-98 %] 93  %  on   ;   Device (Oxygen Therapy): room air    Body mass index is 33.17 kg/m².  Wt Readings from Last 3 Encounters:   04/02/24 117 kg (258 lb 6.1 oz)   03/28/24 115 kg (254 lb 3.2 oz)   03/28/24 118 kg (260 lb)       PHYSICAL EXAM:  Constitutional:  Well-developed and well-nourished.  No respiratory distress.      HENT:  Head:  Normocephalic and atraumatic.  Mouth:  Moist mucous membranes.    Eyes:  Conjunctivae and EOM are normal.  Pupils are equal, round, and reactive to light.  No scleral icterus.    Cardiovascular:  Normal rate, regular rhythm and normal heart sounds with no murmur.  Pulmonary/Chest:  No respiratory distress, no wheezes, no crackles, with normal breath sounds and good air movement.  Abdominal:  Soft.  Bowel sounds are normal.  No distension and no tenderness.   Musculoskeletal:  No edema, no tenderness, and no deformity.  No red or swollen joints anywhere.    Neurological:  Alert and oriented to person, place, and time.  No gross neurological deficit.   Skin:  Skin is warm and dry. No rash noted. No pallor.   Peripheral vascular:  Strong pulses in all 4 extremities with no clubbing, no cyanosis, no edema.    DISCHARGE DISPOSITION   Stable    DISCHARGE MEDICATIONS:     Discharge Medications        New Medications        Instructions Start Date   chlordiazePOXIDE 25 MG capsule  Commonly known as: LIBRIUM   50 mg, Oral, 3 Times Daily PRN      chlordiazePOXIDE 25 MG capsule  Commonly known as: LIBRIUM   25 mg, Oral, 2 Times Daily      chlordiazePOXIDE 10 MG capsule  Commonly known as: LIBRIUM   10 mg, Oral, 2 Times Daily   Start Date: April 3, 2024     chlordiazePOXIDE 10 MG capsule  Commonly known as: LIBRIUM   10 mg, Oral, Once   Start Date: April 4, 2024     cloNIDine 0.1 MG tablet  Commonly known as: CATAPRES   0.1 mg, Oral, Every 6 Hours PRN             Continue These Medications        Instructions Start Date   acetaminophen 650 MG 8 hr tablet  Commonly known as: TYLENOL   650 mg, Oral,  Every 8 Hours PRN      albuterol sulfate  (90 Base) MCG/ACT inhaler  Commonly known as: PROVENTIL HFA;VENTOLIN HFA;PROAIR HFA   2 puffs, Inhalation, Every 4 Hours PRN      amitriptyline 50 MG tablet  Commonly known as: ELAVIL   50 mg, Oral, Nightly      busPIRone 10 MG tablet  Commonly known as: BUSPAR   10 mg, Oral, 2 Times Daily      DULoxetine 60 MG capsule  Commonly known as: CYMBALTA   60 mg, Oral, Daily      Fluticasone-Salmeterol 250-50 MCG/ACT DISKUS  Commonly known as: ADVAIR/WIXELA   1 puff, Inhalation, 2 Times Daily - RT      folic acid 1 MG tablet  Commonly known as: FOLVITE   1 mg, Oral, Daily      lisinopril 20 MG tablet  Commonly known as: PRINIVIL,ZESTRIL   20 mg, Oral, Daily      magnesium oxide 400 MG tablet  Commonly known as: MAG-OX   400 mg, Oral, Daily      methocarbamol 500 MG tablet  Commonly known as: ROBAXIN   500 mg, Oral, 3 Times Daily PRN      multivitamin tablet tablet   1 tablet, Oral, Daily      Narcan 4 MG/0.1ML nasal spray  Generic drug: naloxone   1 spray, Nasal, As Needed, Call 911. Don't prime. [place] in 1 nostril for overdose. Repeat in 2-3 minutes in other nostril if no or minimal breathing/responsiveness.      pantoprazole 40 MG EC tablet  Commonly known as: PROTONIX   40 mg, Oral, Daily      prazosin 2 MG capsule  Commonly known as: MINIPRESS   2 mg, Oral, Nightly      QUEtiapine 50 MG tablet  Commonly known as: SEROquel   50 mg, Oral, 3 Times Daily      Spiriva Respimat 1.25 MCG/ACT aerosol solution inhaler  Generic drug: Tiotropium Bromide Monohydrate   2 puffs, Inhalation, Daily - RT      thiamine 100 MG tablet  tablet  Commonly known as: VITAMIN B-1   100 mg, Oral, Daily      Vraylar 1.5 MG capsule capsule  Generic drug: Cariprazine HCl   1.5 mg, Oral, Daily             Stop These Medications      meloxicam 15 MG tablet  Commonly known as: MOBIC     naproxen 500 MG tablet  Commonly known as: NAPROSYN                 Follow-up Information       Jhon Khoury PA-C .     Specialty: Physician Assistant  Contact information:  [address] [facility] [id number]  [phone]                              TEST  RESULTS PENDING AT DISCHARGE       CODE STATUS  Code Status and Medical Interventions:   Ordered at: 04/01/24 0414     Code Status (Patient has no pulse and is not breathing):    CPR (Attempt to Resuscitate)     Medical Interventions (Patient has pulse or is breathing):    Full Support       Beny Cerna MD  [facility] Hospitalist  04/02/24  12:29 EDT    Please note that this discharge summary required more than 30 minutes to complete.

## 2024-04-02 NOTE — PLAN OF CARE
Problem: Adult Inpatient Plan of Care  Goal: Plan of Care Review  Outcome: Ongoing, Progressing  Flowsheets (Taken 4/2/2024 0524)  Progress: improving  Plan of Care Reviewed With: patient  Outcome Evaluation: A/Ox4. Calm and cooperative this shift. PRN pain meds administered. CIWA scores low. UOP adequate  Goal: Patient-Specific Goal (Individualized)  Outcome: Ongoing, Progressing  Goal: Absence of Hospital-Acquired Illness or Injury  Outcome: Ongoing, Progressing  Intervention: Identify and Manage Fall Risk  Recent Flowsheet Documentation  Taken 4/2/2024 0500 by Angeles Hicks RN  Safety Promotion/Fall Prevention:   fall prevention program maintained   safety round/check completed  Taken 4/2/2024 0300 by Angeles Hicks RN  Safety Promotion/Fall Prevention:   fall prevention program maintained   safety round/check completed  Taken 4/2/2024 0200 by Angeles Hicks RN  Safety Promotion/Fall Prevention:   fall prevention program maintained   safety round/check completed  Taken 4/2/2024 0100 by Angeles Hicks RN  Safety Promotion/Fall Prevention:   fall prevention program maintained   safety round/check completed  Taken 4/2/2024 0000 by Angeles Hicks RN  Safety Promotion/Fall Prevention:   fall prevention program maintained   safety round/check completed  Taken 4/1/2024 2300 by Angeles Hicks RN  Safety Promotion/Fall Prevention:   fall prevention program maintained   safety round/check completed  Taken 4/1/2024 2200 by Angeles Hicks RN  Safety Promotion/Fall Prevention:   fall prevention program maintained   safety round/check completed  Taken 4/1/2024 2100 by Angeles Hicks RN  Safety Promotion/Fall Prevention:   fall prevention program maintained   safety round/check completed  Taken 4/1/2024 2000 by Angeles Hicks RN  Safety Promotion/Fall Prevention:   fall prevention program maintained   safety round/check completed  Taken 4/1/2024 1900 by Angeles Hicks RN  Safety Promotion/Fall  Prevention:   fall prevention program maintained   safety round/check completed  Intervention: Prevent Skin Injury  Recent Flowsheet Documentation  Taken 4/2/2024 0200 by Angeles Hicks RN  Body Position: position changed independently  Taken 4/2/2024 0000 by Angeles Hicks RN  Body Position: position changed independently  Taken 4/1/2024 2200 by Angeles Hicks RN  Body Position: position changed independently  Taken 4/1/2024 2000 by Angeles Hicks RN  Body Position: position changed independently  Intervention: Prevent and Manage VTE (Venous Thromboembolism) Risk  Recent Flowsheet Documentation  Taken 4/2/2024 0200 by Angeles Hicks RN  Activity Management: ambulated in room  Taken 4/1/2024 1920 by Angeles Hicks RN  Range of Motion: active ROM (range of motion) encouraged  Intervention: Prevent Infection  Recent Flowsheet Documentation  Taken 4/1/2024 1920 by Angeles Hicks RN  Infection Prevention:   single patient room provided   rest/sleep promoted   environmental surveillance performed  Goal: Optimal Comfort and Wellbeing  Outcome: Ongoing, Progressing  Intervention: Monitor Pain and Promote Comfort  Recent Flowsheet Documentation  Taken 4/2/2024 0519 by Angeles Hicks RN  Pain Management Interventions: see MAR  Taken 4/1/2024 2106 by Angeles Hicks RN  Pain Management Interventions: see MAR  Intervention: Provide Person-Centered Care  Recent Flowsheet Documentation  Taken 4/2/2024 0200 by Angeles Hicks RN  Trust Relationship/Rapport:   care explained   choices provided   reassurance provided  Taken 4/1/2024 1920 by Angeles Hicks RN  Trust Relationship/Rapport:   care explained   choices provided   thoughts/feelings acknowledged  Goal: Readiness for Transition of Care  Outcome: Ongoing, Progressing     Problem: Alcohol Withdrawal  Goal: Alcohol Withdrawal Symptom Control  Outcome: Ongoing, Progressing     Problem: Acute Neurologic Deterioration (Alcohol Withdrawal)  Goal: Optimal  Neurologic Function  Outcome: Ongoing, Progressing     Problem: Substance Misuse (Alcohol Withdrawal)  Goal: Readiness for Change Identified  Outcome: Ongoing, Progressing     Problem: Fall Injury Risk  Goal: Absence of Fall and Fall-Related Injury  Outcome: Ongoing, Progressing  Intervention: Identify and Manage Contributors  Recent Flowsheet Documentation  Taken 4/1/2024 2200 by Angeles Hicks RN  Medication Review/Management: medications reviewed  Taken 4/1/2024 2000 by Angeles Hicks RN  Medication Review/Management: medications reviewed  Intervention: Promote Injury-Free Environment  Recent Flowsheet Documentation  Taken 4/2/2024 0500 by Angeles Hicks RN  Safety Promotion/Fall Prevention:   fall prevention program maintained   safety round/check completed  Taken 4/2/2024 0300 by Angeles Hicks RN  Safety Promotion/Fall Prevention:   fall prevention program maintained   safety round/check completed  Taken 4/2/2024 0200 by Angeles Hicks RN  Safety Promotion/Fall Prevention:   fall prevention program maintained   safety round/check completed  Taken 4/2/2024 0100 by Angeles Hicks RN  Safety Promotion/Fall Prevention:   fall prevention program maintained   safety round/check completed  Taken 4/2/2024 0000 by Angeles Hicks RN  Safety Promotion/Fall Prevention:   fall prevention program maintained   safety round/check completed  Taken 4/1/2024 2300 by Angeles Hicks RN  Safety Promotion/Fall Prevention:   fall prevention program maintained   safety round/check completed  Taken 4/1/2024 2200 by Angeles Hicks RN  Safety Promotion/Fall Prevention:   fall prevention program maintained   safety round/check completed  Taken 4/1/2024 2100 by Angeles Hicks RN  Safety Promotion/Fall Prevention:   fall prevention program maintained   safety round/check completed  Taken 4/1/2024 2000 by Angeles Hicks RN  Safety Promotion/Fall Prevention:   fall prevention program maintained   safety round/check  completed  Taken 4/1/2024 1900 by Angeles Hicks RN  Safety Promotion/Fall Prevention:   fall prevention program maintained   safety round/check completed     Problem: COPD (Chronic Obstructive Pulmonary Disease) Comorbidity  Goal: Maintenance of COPD Symptom Control  Outcome: Ongoing, Progressing  Intervention: Maintain COPD-Symptom Control  Recent Flowsheet Documentation  Taken 4/1/2024 2200 by Angeles Hicks RN  Medication Review/Management: medications reviewed  Taken 4/1/2024 2000 by Angeles Hicks RN  Medication Review/Management: medications reviewed     Problem: Skin Injury Risk Increased  Goal: Skin Health and Integrity  Outcome: Ongoing, Progressing  Intervention: Optimize Skin Protection  Recent Flowsheet Documentation  Taken 4/2/2024 0200 by Angeles Hicks RN  Head of Bed (HOB) Positioning: HOB at 30 degrees  Taken 4/2/2024 0000 by Angeles Hicks RN  Head of Bed (HOB) Positioning: HOB at 30 degrees  Taken 4/1/2024 2200 by Angeles Hicks RN  Head of Bed (HOB) Positioning: HOB at 30 degrees  Taken 4/1/2024 2000 by Angeles Hicks RN  Head of Bed (HOB) Positioning: HOB at 30 degrees   Goal Outcome Evaluation:  Plan of Care Reviewed With: patient        Progress: improving  Outcome Evaluation: A/Ox4. Calm and cooperative this shift. PRN pain meds administered. CIWA scores low. UOP adequate

## 2024-04-02 NOTE — PLAN OF CARE
Goal Outcome Evaluation:              Outcome Evaluation: VSS< afebrile, alert and oriented, sitting in recliner watching TV. No ativan this shit. Status has been changed to Med/Surg.

## 2024-04-02 NOTE — PAYOR COMM NOTE
"[facility]  NPI:[id number]    Utilization Review  Contact: Sayra Bhatia RN  Phone: [phone]  Fax:[phone]    DISCHARGE NOTIFICATION    Leonard Crandall (53 y.o. Male)       Date of Birth   1970    Social Security Number       Address   [address] [place] [address] [address] [facility] [address]    Home Phone   [phone]    MRN   [id number]       Yazidism   None    Marital Status   Single                            Admission Date   24    Admission Type   Urgent    Admitting Provider   Priyanka Thomas DO    Attending Provider       Department, Room/Bed   [facility] CRITICAL CARE, CC09/1C       Discharge Date   2024    Discharge Disposition   Psychiatric Hospital or Unit (DC - External or Congregational)    Discharge Destination                                 Attending Provider: (none)   Allergies: Fish-derived Products    Isolation: None   Infection: None   Code Status: CPR    Ht: 188 cm (74\")   Wt: 117 kg (258 lb 6.1 oz)    Admission Cmt: None   Principal Problem: Alcohol withdrawal [F10.939]                   Active Insurance as of 2024       Primary Coverage       Payor Plan Insurance Group Employer/Plan Group    ANTHEM MEDICAID [place] MEDICAID [id number]       Payor Plan Address Payor Plan Phone Number Payor Plan Fax Number Effective Dates    [address] [address] [phone]  2021 - None Entered    [facility] [id number]         Subscriber Name Subscriber Birth Date Member ID       LEONARD CRANDALL 1970 [id number]                     Emergency Contacts        (Rel.) Home Phone Work Phone Mobile Phone    Derrick Adkins (Friend) [phone] -- --    Talia Crandall (Daughter) [phone] -- --                 Discharge Summary        Beny Cerna MD at 24 1221              [facility] HOSPITALISTS DISCHARGE SUMMARY    Patient Identification:  Name:  Leonard Crandall  Age:  53 y.o.  Sex:  male  :  1970  MRN:  [id number]  Visit " Number:  [id number]    Date of Admission: 4/1/2024  Date of Discharge:  4/2/2024    PCP: Jhon Khoury PA-C    DISCHARGE DIAGNOSIS  #Severe alcohol use disorder with dependence  #Acute alcohol withdrawal with delirium tremens  #Uncontrolled essential hypertension due to above  #SIRS with tachycardia and RR 20 due to above  #Tobacco use disorder    Chronic medical conditions:  -Major depressive disorder  -COPD not in acute exacerbation  -GERD    CONSULTS   None     PROCEDURES PERFORMED  None     HOSPITAL COURSE  Patient is a 53 y.o. male presented on 3/28 to [facility] for alcohol detox and was transferred to CCU on 4/1 for worsening withdrawal  Please see the admitting history and physical for further details.      Mr. Crandall is our 52 yo M with hx alcoholism, anxiety, bipolar disorder, COPD, depression, tobacco use and hypertension who presented to inpatient detox on 3/28 for alcohol detox and transferred to CCU for increasing benzo requirement and hallucinations. Patient started on librium taper along with PRN ativan per CIWA. Patient also given IV thiamine while in unit. He has progressively improved with decreasing CIWA, ativan requirements, and was oriented without delusion on my evaluation this AM. I have increased his lisinopril to home dose of 20 mg daily with improvement in his BP. Can consider increasing to 40 mg if continues to be uncontrolled but suspect BP will improve as withdrawal improves. Patient noted preference to return to [facility] to continue detox. Patient graciously accepted by Dr. Mansfield to return to detox unit to continue his care.     VITAL SIGNS:  Temp:  [97 °F (36.1 °C)-98 °F (36.7 °C)] 98 °F (36.7 °C)  Heart Rate:  [] 77  Resp:  [14-19] 18  BP: (120-164)/() 129/81  SpO2:  [90 %-98 %] 93 %  on   ;   Device (Oxygen Therapy): room air    Body mass index is 33.17 kg/m².  Wt Readings from Last 3 Encounters:   04/02/24 117 kg (258 lb 6.1 oz)   03/28/24 115 kg (254 lb  3.2 oz)   03/28/24 118 kg (260 lb)       PHYSICAL EXAM:  Constitutional:  Well-developed and well-nourished.  No respiratory distress.      HENT:  Head:  Normocephalic and atraumatic.  Mouth:  Moist mucous membranes.    Eyes:  Conjunctivae and EOM are normal.  Pupils are equal, round, and reactive to light.  No scleral icterus.    Cardiovascular:  Normal rate, regular rhythm and normal heart sounds with no murmur.  Pulmonary/Chest:  No respiratory distress, no wheezes, no crackles, with normal breath sounds and good air movement.  Abdominal:  Soft.  Bowel sounds are normal.  No distension and no tenderness.   Musculoskeletal:  No edema, no tenderness, and no deformity.  No red or swollen joints anywhere.    Neurological:  Alert and oriented to person, place, and time.  No gross neurological deficit.   Skin:  Skin is warm and dry. No rash noted. No pallor.   Peripheral vascular:  Strong pulses in all 4 extremities with no clubbing, no cyanosis, no edema.    DISCHARGE DISPOSITION   Stable    DISCHARGE MEDICATIONS:     Discharge Medications        New Medications        Instructions Start Date   chlordiazePOXIDE 25 MG capsule  Commonly known as: LIBRIUM   50 mg, Oral, 3 Times Daily PRN      chlordiazePOXIDE 25 MG capsule  Commonly known as: LIBRIUM   25 mg, Oral, 2 Times Daily      chlordiazePOXIDE 10 MG capsule  Commonly known as: LIBRIUM   10 mg, Oral, 2 Times Daily   Start Date: April 3, 2024     chlordiazePOXIDE 10 MG capsule  Commonly known as: LIBRIUM   10 mg, Oral, Once   Start Date: April 4, 2024     cloNIDine 0.1 MG tablet  Commonly known as: CATAPRES   0.1 mg, Oral, Every 6 Hours PRN             Continue These Medications        Instructions Start Date   acetaminophen 650 MG 8 hr tablet  Commonly known as: TYLENOL   650 mg, Oral, Every 8 Hours PRN      albuterol sulfate  (90 Base) MCG/ACT inhaler  Commonly known as: PROVENTIL HFA;VENTOLIN HFA;PROAIR HFA   2 puffs, Inhalation, Every 4 Hours PRN       amitriptyline 50 MG tablet  Commonly known as: ELAVIL   50 mg, Oral, Nightly      busPIRone 10 MG tablet  Commonly known as: BUSPAR   10 mg, Oral, 2 Times Daily      DULoxetine 60 MG capsule  Commonly known as: CYMBALTA   60 mg, Oral, Daily      Fluticasone-Salmeterol 250-50 MCG/ACT DISKUS  Commonly known as: ADVAIR/WIXELA   1 puff, Inhalation, 2 Times Daily - RT      folic acid 1 MG tablet  Commonly known as: FOLVITE   1 mg, Oral, Daily      lisinopril 20 MG tablet  Commonly known as: PRINIVIL,ZESTRIL   20 mg, Oral, Daily      magnesium oxide 400 MG tablet  Commonly known as: MAG-OX   400 mg, Oral, Daily      methocarbamol 500 MG tablet  Commonly known as: ROBAXIN   500 mg, Oral, 3 Times Daily PRN      multivitamin tablet tablet   1 tablet, Oral, Daily      Narcan 4 MG/0.1ML nasal spray  Generic drug: naloxone   1 spray, Nasal, As Needed, Call 911. Don't prime. [place] in 1 nostril for overdose. Repeat in 2-3 minutes in other nostril if no or minimal breathing/responsiveness.      pantoprazole 40 MG EC tablet  Commonly known as: PROTONIX   40 mg, Oral, Daily      prazosin 2 MG capsule  Commonly known as: MINIPRESS   2 mg, Oral, Nightly      QUEtiapine 50 MG tablet  Commonly known as: SEROquel   50 mg, Oral, 3 Times Daily      Spiriva Respimat 1.25 MCG/ACT aerosol solution inhaler  Generic drug: Tiotropium Bromide Monohydrate   2 puffs, Inhalation, Daily - RT      thiamine 100 MG tablet  tablet  Commonly known as: VITAMIN B-1   100 mg, Oral, Daily      Vraylar 1.5 MG capsule capsule  Generic drug: Cariprazine HCl   1.5 mg, Oral, Daily             Stop These Medications      meloxicam 15 MG tablet  Commonly known as: MOBIC     naproxen 500 MG tablet  Commonly known as: NAPROSYN                 Follow-up Information       Jhon Khoury PA-C .    Specialty: Physician Assistant  Contact information:  [address] [facility] [address] [phone]                              TEST  RESULTS PENDING AT  DISCHARGE       CODE STATUS  Code Status and Medical Interventions:   Ordered at: 04/01/24 0414     Code Status (Patient has no pulse and is not breathing):    CPR (Attempt to Resuscitate)     Medical Interventions (Patient has pulse or is breathing):    Full Support       Beny Cerna MD  [facility]  04/02/24  12:29 EDT    Please note that this discharge summary required more than 30 minutes to complete.      Electronically signed by Beny Cerna MD at 04/02/24 4020

## 2024-04-03 LAB
QT INTERVAL: 404 MS
QTC INTERVAL: 416 MS

## 2024-04-03 PROCEDURE — 94761 N-INVAS EAR/PLS OXIMETRY MLT: CPT

## 2024-04-03 PROCEDURE — 94760 N-INVAS EAR/PLS OXIMETRY 1: CPT

## 2024-04-03 PROCEDURE — 94664 DEMO&/EVAL PT USE INHALER: CPT

## 2024-04-03 PROCEDURE — 94799 UNLISTED PULMONARY SVC/PX: CPT

## 2024-04-03 PROCEDURE — 99222 1ST HOSP IP/OBS MODERATE 55: CPT | Performed by: PSYCHIATRY & NEUROLOGY

## 2024-04-03 PROCEDURE — 94640 AIRWAY INHALATION TREATMENT: CPT

## 2024-04-03 RX ADMIN — BUSPIRONE HYDROCHLORIDE 10 MG: 10 TABLET ORAL at 08:30

## 2024-04-03 RX ADMIN — BUDESONIDE AND FORMOTEROL FUMARATE DIHYDRATE 2 PUFF: 160; 4.5 AEROSOL RESPIRATORY (INHALATION) at 09:09

## 2024-04-03 RX ADMIN — QUETIAPINE FUMARATE 50 MG: 100 TABLET ORAL at 08:30

## 2024-04-03 RX ADMIN — LORAZEPAM 0.5 MG: 0.5 TABLET ORAL at 14:47

## 2024-04-03 RX ADMIN — Medication 1 TABLET: at 08:30

## 2024-04-03 RX ADMIN — CHLORDIAZEPOXIDE HYDROCHLORIDE 10 MG: 10 CAPSULE ORAL at 08:30

## 2024-04-03 RX ADMIN — TIOTROPIUM BROMIDE INHALATION SPRAY 2 PUFF: 3.12 SPRAY, METERED RESPIRATORY (INHALATION) at 09:10

## 2024-04-03 RX ADMIN — PANTOPRAZOLE SODIUM 40 MG: 40 TABLET, DELAYED RELEASE ORAL at 06:38

## 2024-04-03 RX ADMIN — LORAZEPAM 0.5 MG: 0.5 TABLET ORAL at 08:32

## 2024-04-03 RX ADMIN — BUSPIRONE HYDROCHLORIDE 10 MG: 10 TABLET ORAL at 21:14

## 2024-04-03 RX ADMIN — QUETIAPINE FUMARATE 50 MG: 100 TABLET ORAL at 15:46

## 2024-04-03 RX ADMIN — QUETIAPINE FUMARATE 50 MG: 100 TABLET ORAL at 21:14

## 2024-04-03 RX ADMIN — CHLORDIAZEPOXIDE HYDROCHLORIDE 10 MG: 10 CAPSULE ORAL at 21:14

## 2024-04-03 RX ADMIN — Medication 100 MG: at 08:30

## 2024-04-03 RX ADMIN — Medication 2 TABLET: at 08:30

## 2024-04-03 RX ADMIN — BUDESONIDE AND FORMOTEROL FUMARATE DIHYDRATE 2 PUFF: 160; 4.5 AEROSOL RESPIRATORY (INHALATION) at 20:02

## 2024-04-03 RX ADMIN — CARIPRAZINE 1.5 MG: 1.5 CAPSULE, GELATIN COATED ORAL at 08:32

## 2024-04-03 RX ADMIN — PRAZOSIN HYDROCHLORIDE 2 MG: 1 CAPSULE ORAL at 21:14

## 2024-04-03 RX ADMIN — LISINOPRIL 20 MG: 10 TABLET ORAL at 08:30

## 2024-04-03 RX ADMIN — DULOXETINE HYDROCHLORIDE 60 MG: 60 CAPSULE, DELAYED RELEASE ORAL at 08:30

## 2024-04-03 RX ADMIN — MAGNESIUM GLUCONATE 500 MG ORAL TABLET 400 MG: 500 TABLET ORAL at 08:30

## 2024-04-03 RX ADMIN — LORAZEPAM 0.5 MG: 0.5 TABLET ORAL at 21:14

## 2024-04-03 NOTE — PHARMACY PATIENT ASSISTANCE
Pharmacy checked on cost of  Vraylar from patient's home medication list. According to patient's pharmacy, the copay is $0 for a 1 month supply. Medication was last filled on 2/15/2024. No other issues identified at this time.      Thank you,    Munira Jacob, PharmD  04/03/24  08:20 EDT

## 2024-04-03 NOTE — PLAN OF CARE
Goal Outcome Evaluation:        Problem: Adult Behavioral Health Plan of Care  Goal: Patient-Specific Goal (Individualization)  Outcome: Ongoing, Progressing  Flowsheets  Taken 4/3/2024 1003 by Nighat Orr CSW  Patient-Specific Goals (Include Timeframe): Identify 2-3 coping skills, address relapse prevention methods, complete aftercare plans, and deny SI/HI prior to discharge.  Individualized Care Needs: Therapist to offer 1-4 therapy sessions, aftercare planning, safety planning, family education, group therapy, and brief CBT/MI interventions.  Taken 4/3/2024 0958 by Nighat Orr CSW  Patient Personal Strengths:   resourceful   resilient   motivated for treatment   motivated for recovery   family/social support   stable living environment   spiritual/Yazidism support   positive vocational history   positive educational history   parenting skills   independent living skills  Patient Vulnerabilities:   history of unsuccessful treatment   poor impulse control   substance abuse/addiction   lacks insight into illness   occupational insecurity  Taken 4/2/2024 1502 by Karen Adkins RN  Anxieties, Fears or Concerns: None verbalized  Goal: Optimized Coping Skills in Response to Life Stressors  Outcome: Ongoing, Progressing  Flowsheets (Taken 4/3/2024 1003)  Optimized Coping Skills in Response to Life Stressors: making progress toward outcome  Intervention: Promote Effective Coping Strategies  Flowsheets (Taken 4/3/2024 1003)  Supportive Measures:   active listening utilized   counseling provided   decision-making supported   goal-setting facilitated   verbalization of feelings encouraged   self-responsibility promoted   positive reinforcement provided   self-reflection promoted   self-care encouraged  Goal: Develops/Participates in Therapeutic [place] to Support Successful Transition  Outcome: Ongoing, Progressing  Flowsheets (Taken 4/3/2024 1003)  Develops/Participates in Therapeutic [place] to  Support Successful Transition: making progress toward outcome  Intervention: Foster Therapeutic [place]  Flowsheets (Taken 4/3/2024 1003)  Trust Relationship/Rapport:   care explained   reassurance provided   choices provided   thoughts/feelings acknowledged   emotional support provided   empathic listening provided   questions answered   questions encouraged  Intervention: Mutually Develop Transition Plan  Flowsheets  Taken 4/3/2024 1003  Outpatient/Agency/Support Group Needs: residential services  Transition Support:   follow-up care discussed   community resources reviewed   crisis management plan promoted   crisis management plan verbalized   follow-up care coordinated  Anticipated Discharge Disposition: residential substance use unit  Taken 4/3/2024 1002  Discharge Coordination/Progress: Patient has [place] [employer]. Therapist met with patient to complete assessment. Patient is interested in continuing treatment at [facility].  Transportation Anticipated: agency  Transportation Concerns: none  Current Discharge Risk: substance use/abuse  Concerns to be Addressed:   substance/tobacco abuse/use   mental health   grief and loss  Readmission Within the Last 30 Days: no previous admission in last 30 days  Patient/Family Anticipated Services at Transition: rehabilitation services  Patient's Choice of Community Agency(s): [facility]  Patient/Family Anticipates Transition to: inpatient rehabilitation facility  Offered/Gave Vendor List: no      DATA:      Therapist met individually with patient this date to introduce role and to discuss hospitalization expectations. Patient agreeable.     Consent for [facility] in chartlet.   Therapist spoke with Elizabeth at [facility], agreeable for patient to return at discharge. Agency will provide transportation.     Clinical Maneuvering/Intervention:     Therapist assisted patient in processing above session content; acknowledged and normalized patient’s thoughts, feelings,  and concerns. Discussed the therapist/patient relationship and explain the parameters and limitations of relative confidentiality.  Also discussed the importance of active participation, and honesty to the treatment process. Encouraged the patient to discuss/vent their feelings, frustrations, and fears concerning their ongoing medical issues and validated their feelings.     Discussed the importance of finding enjoyable activities and coping skills that the patient can engage in a regular basis. Discussed healthy coping skills such as distraction, self love, grounding, thought challenges/reframing, etc. Provided patient with list of healthy coping skills this date. Discussed the importance of medication compliance. Praised the patient for seeking help and spent the majority of the session building rapport.       Allowed patient to freely discuss issues without interruption or judgment. Provided safe, confidential environment to facilitate the development of positive therapeutic relationship and encourage open, honest communication.      Therapist addressed discharge safety planning this date. Assisted patient in identifying risk factors which would indicate the need for higher level of care after discharge; including thoughts to harm self or others and/or self-harming behavior. Encouraged patient to call 911, or present to the nearest emergency room should any of these events occur. Discussed crisis intervention services and means to access. Encouraged securing any objects of harm.       Therapist completed integrated summary, treatment plan, and initiated social history this date. Therapist is strongly encouraging family involvement in treatment.       ASSESSMENT:      The patient is a 52 y/o  male directly readmitted from CCU to complete alcohol detox treatment. Therapist met with patient at this time to complete assessment. He reports moderate anxiety and mild depression, denies SI/HI/AVH and active  withdrawal symptoms. Patient expects to be stable for discharge tomorrow and will return to [facility]. Agency will be providing transportation. 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 Patient began drinking at age 13 but reports alcohol becoming a problem for him in his 40's after his mother passed away. His longest period of sobriety has been 100 days in 2023. Patient is not agreeable to family involvement in treatment. Reviewed healthy coping skills and relapse prevention. Patient denies having any additional needs or concerns at this time.     PLAN:       Patient to remain hospitalized this date.     Treatment team will focus efforts on stabilizing patient's acute symptoms while providing education on healthy coping and crisis management to reduce hospitalizations. Patient requires daily psychiatrist evaluation and 24/7 nursing supervision to promote patient safety.     Therapist will offer 1-4 individual sessions, 1 therapy group daily, family education, and appropriate referral.    Patient to return to [facility] at discharge.

## 2024-04-03 NOTE — H&P
INITIAL PSYCHIATRIC HISTORY & PHYSICAL    Patient Identification:  Name:   Leonard Crandall  Age:   53 y.o.  Sex:   male  :   1970  MRN:   [id number]  Visit Number:   [id number]  Primary Care Physician:   Jhon Khoury PA-C    SUBJECTIVE    CC/Focus of Exam: Detox alcohol    HPI: Leonard Crandall is a 53 y.o. male who was admitted on 2024 with complaints of alcohol use and withdrawals. The patient was first admitted to the detox unit on 3/28/24 for alcohol use and withdrawals. He developed worsening of his withdrawal symptoms and was in DTs early am 24 and was transferred to CCU. He was transferred back to detox unit on 24. The patient reports a long history of substance use. First use was 13 years old. Over time the use increased and the patient  continued to use despite negative consequences including relationship problems, social and financial problems. The patient endorses symptoms of tolerance and withdrawals and ongoing cravings to use. Has tried to cut down and stop but has not been successful. Spends too much time and resources in pursuit of substance use. Longest period of sobriety is reported to be 100 days  Currently using 2 fifths of vodka  Last use 3-  Withdrawal symptoms patient denies any  Patient denies any substance abuse.  Patient states that he uses tobacco.  Patient denies any history of seizures with withdrawal.  Patient states stress as the reason he relapsed.  Patient states not having a job as a stressor in his life.  Patient denies any history of physical, mental, or sexual abuse.  Patient rates his appetite as fair.  Patient rates his sleep as fair.  Patient states that he has nightmares.  Patient rates his anxiety on a scale of 1-10 with 10 being the most severe a 6.  Patient rates his depression on a scale of 1-10 with 10 being the most severe a 2.  Patient rates his cravings on a scale of 1-10 with 10 being the most severe a 9.  Patient denies any suicidal  ideation.  Patient denies any homicidal ideation.  Patient denies any hallucinations.  Patient was admitted to [facility] psychiatry for further safety and stabilization.    Available medical/psychiatric records reviewed and incorporated into the current document.     PAST PSYCHIATRIC HX: Patient has had 1 prior admission on 3--4-.  Patient denies any outpatient care.    SUBSTANCE USE HX: UDS was positive for THC.  See HPI for current use.    SOCIAL HX: Patient states he was born in [facility].  Patient states he was raised between [place] in [place] [facility] [place].  Patient states he currently resides with a friend in [place] [facility].  Patient states he is  and has 3 grown children.  Patient states he is currently unemployed.  Patient states that he has received his GED.  Patient denies any legal issues.    Past Medical History:   Diagnosis Date    Alcoholism 01/20/2022    Anxiety     Bipolar disorder     COPD (chronic obstructive pulmonary disease) 01/20/2022    Depression     Hypertension     Tobacco abuse 01/20/2022    Withdrawal symptoms, alcohol        Past Surgical History:   Procedure Laterality Date    NO PAST SURGERIES         Family History   Problem Relation Age of Onset    Diabetes Mother     Acne Mother     Alcohol abuse Father     Heart attack Father     Alcohol abuse Brother          Medications Prior to Admission   Medication Sig Dispense Refill Last Dose    acetaminophen (TYLENOL) 650 MG 8 hr tablet Take 1 tablet by mouth Every 8 (Eight) Hours As Needed for Mild Pain.   Unknown    albuterol sulfate  (90 Base) MCG/ACT inhaler Inhale 2 puffs Every 4 (Four) Hours As Needed for Wheezing or Shortness of Air. 18 g 1 Unknown    amitriptyline (ELAVIL) 50 MG tablet Take 1 tablet by mouth Every Night. Indications: depression   Unknown    busPIRone (BUSPAR) 10 MG tablet Take 1 tablet by mouth 2 (Two) Times a Day. Indications: Anxiety Disorder    Unknown    Cariprazine HCl (Vraylar) 1.5 MG capsule capsule Take 1 capsule by mouth Daily. Indications: Major Depressive Disorder   Unknown    DULoxetine (CYMBALTA) 60 MG capsule Take 1 capsule by mouth Daily. Indications: Generalized Anxiety Disorder   Unknown    Fluticasone-Salmeterol (ADVAIR/WIXELA) 250-50 MCG/ACT DISKUS Inhale 1 puff 2 (Two) Times a Day. Indications: Chronic Obstructive Lung Disease   Unknown    folic acid (FOLVITE) 1 MG tablet Take 1 tablet by mouth Daily.   Unknown    lisinopril (PRINIVIL,ZESTRIL) 20 MG tablet Take 1 tablet by mouth Daily. Indications: High Blood Pressure Disorder   Unknown    magnesium oxide (MAG-OX) 400 MG tablet Take 1 tablet by mouth Daily.   Unknown    methocarbamol (ROBAXIN) 500 MG tablet Take 1 tablet by mouth 3 (Three) Times a Day As Needed for Muscle Spasms.   Unknown    multivitamin tablet tablet Take 1 tablet by mouth Daily.   Unknown    naloxone (Narcan) 4 MG/0.1ML nasal spray 1 spray into the nostril(s) as directed by provider As Needed for Opioid Reversal. Call 911. Don't prime. [place] in 1 nostril for overdose. Repeat in 2-3 minutes in other nostril if no or minimal breathing/responsiveness.   Unknown    pantoprazole (PROTONIX) 40 MG EC tablet Take 1 tablet by mouth Daily. Indications: Indigestion   Unknown    prazosin (MINIPRESS) 2 MG capsule Take 1 capsule by mouth Every Night. Indications: High Blood Pressure Disorder   Unknown    QUEtiapine (SEROquel) 50 MG tablet Take 1 tablet by mouth 3 (Three) Times a Day. Indications: Major Depressive Disorder   Unknown    thiamine (VITAMIN B-1) 100 MG tablet  tablet Take 1 tablet by mouth Daily.   Unknown    Tiotropium Bromide Monohydrate (Spiriva Respimat) 1.25 MCG/ACT aerosol solution inhaler Inhale 2 puffs Daily. Indications: copd   Unknown           ALLERGIES:  Fish-derived products    Temp:  [97.1 °F (36.2 °C)-98.2 °F (36.8 °C)] 98.1 °F (36.7 °C)  Heart Rate:  [72-92] 88  Resp:  [16-18] 18  BP: (106-141)/(58-95)  116/70    REVIEW OF SYSTEMS:  Review of Systems   Constitutional: Negative.    HENT: Negative.     Eyes: Negative.    Respiratory: Negative.     Cardiovascular: Negative.    Gastrointestinal: Negative.    Endocrine: Negative.    Genitourinary: Negative.    Musculoskeletal: Negative.    Skin: Negative.    Allergic/Immunologic: Negative.    Neurological: Negative.    Hematological: Negative.    Psychiatric/Behavioral: Negative.        See HPI for psychiatric ROS  OBJECTIVE    PHYSICAL EXAM:  Physical Exam  Constitutional:  Appears well-developed and well-nourished.   HENT:   Head: Normocephalic and atraumatic.   Right Ear: External ear normal.   Left Ear: External ear normal.   Mouth/Throat: Oropharynx is clear and moist.   Eyes: Pupils are equal, round, and reactive to light. Conjunctivae and EOM are normal.   Neck: Normal range of motion. Neck supple.   Cardiovascular: Normal rate, regular rhythm and normal heart sounds.    Respiratory: Effort normal and breath sounds normal. No respiratory distress. No wheezes.   GI: Soft. Bowel sounds are normal.No distension. There is no tenderness.   Musculoskeletal: Normal range of motion. No edema or deformity.   Neurological:No cranial nerve deficit. Coordination normal.   Skin: Skin is warm and dry. No rash noted. No erythema.     MENTAL STATUS EXAM:   Hygiene:   fair  Cooperation:  Cooperative  Eye Contact:  Good  Psychomotor Behavior:  Appropriate  Affect:  Appropriate  Hopelessness: 5  Speech:  Normal  Linear  Thought Content:  Normal  Suicidal:  None  Homicidal:  None  Hallucinations:  None  Delusion:  None  Memory:  Intact  Orientation:  Person, Place, Time, and Situation  Reliability:  fair  Insight:  Fair  Judgement:  Poor  Impulse Control:  Poor      Imaging Results (Last 24 Hours)       ** No results found for the last 24 hours. **             ECG/EMG Results (most recent)       Procedure Component Value Units Date/Time    ECG 12 Lead Other; Baseline Cardiac Status  [[id number]] Collected: 04/02/24 1722     Updated: 04/02/24 1723     QT Interval 404 ms      QTC Interval 416 ms     Narrative:      Test Reason : Other~  Blood Pressure :   */*   mmHG  Vent. Rate :  64 BPM     Atrial Rate :  64 BPM     P-R Int : 140 ms          QRS Dur :  94 ms      QT Int : 404 ms       P-R-T Axes :  21  21  54 degrees     QTc Int : 416 ms    Normal sinus rhythm  Normal ECG  When compared with ECG of 28-MAR-2024 20:47,  VA interval has increased  Vent. rate has decreased BY  33 BPM    Referred By: JEFFERSON           Confirmed By:              Lab Results   Component Value Date    GLUCOSE 91 04/02/2024    BUN 12 04/02/2024    CREATININE 0.87 04/02/2024    EGFRIFNONA >60 07/22/2022    EGFRIFAFRI >60 07/22/2022    BCR 13.8 04/02/2024    CO2 23.9 04/02/2024    CALCIUM 8.6 04/02/2024    ALBUMIN 3.6 04/02/2024    AST 26 04/02/2024    ALT 26 04/02/2024       Lab Results   Component Value Date    WBC 8.20 04/02/2024    HGB 14.8 04/02/2024    HCT 44.4 04/02/2024    MCV 96.5 04/02/2024     04/02/2024       Pain Management Panel  More data may exist         Latest Ref Rng & Units 3/28/2024 1/21/2022   Pain Management Panel   Amphetamine, Urine Qual Negative Negative  Negative    Barbiturates Screen, Urine Negative Negative  Negative    Benzodiazepine Screen, Urine Negative Negative  Negative    Buprenorphine, Screen, Urine Negative Negative  Negative    Cocaine Screen, Urine Negative Negative  Negative    Fentanyl, Urine Negative Negative  -   Methadone Screen , Urine Negative Negative  Negative    Methamphetamine, Ur Negative Negative  Negative        Brief Urine Lab Results  (Last result in the past 365 days)        Color   Clarity   Blood   Leuk Est   Nitrite   Protein   CREAT   Urine HCG        03/28/24 1812 Dark Yellow   Clear   Negative   Trace   Negative   100 mg/dL (2+)                          ASSESSMENT & PLAN:        Alcohol use disorder, severe, dependence  -Continue Ativan detox        Nicotine use disorder  -Encourage cessation       COPD (chronic obstructive pulmonary disease)  -Symbicort  -Spiriva        Other recurrent depressive disorders  -Duloxetine  -Cariprazine  -Quetiapine  -Amitriptyline       Anxiety disorder unspecified  -Buspirone        HTN  -Lisinopril       GERD  -Protonix    Hospital bed: No    The patient has been admitted for safety and stabilization.  Patient will be monitored for suicidality daily and maintained on Special Precautions Level 4 (q30 min checks)Special  Precautions Level 4 (q30 min checks).  The patient will have individual and group therapy with a master's level therapist. A master treatment plan will be developed and agreed upon by the patient and his/her treatment team.  The patient's estimated length of stay in the hospital is 5-7 days.       Written by Gracie Mendez acting as scribe for Dr.Mazhar Smiley signature on this note affirms that the note adequately documents the care provided.   This note was generated using a scribe,   Gracie Mendez MA  04/03/24  8:50 AM EDT    ITj MD, personally performed the services described in this documentation as scribed by the above named individual in my presence, and it is both accurate and complete.

## 2024-04-03 NOTE — PLAN OF CARE
Goal Outcome Evaluation:  Plan of Care Reviewed With: patient  Patient Agreement with Plan of Care: agrees     Progress: improving  Outcome Evaluation: Pt rates anxiety 7/10, depression 4/10 and craving 8/10. Pt denies SI/HI/AVH. Pt A/O x4 and reports good appetite and some sleep since coming to unit.    Pt appeared to sleep the majority of the night, approximately 7 hours this shift.

## 2024-04-03 NOTE — PLAN OF CARE
Goal Outcome Evaluation:  Plan of Care Reviewed With: patient  Patient Agreement with Plan of Care: agrees     Progress: improving  Outcome Evaluation: Pt has been calm and cooperaitve, spends most of shift in room. Pt rates anxiety 7, depression 4, states sleep and appetite are good. Pt denies SI/IHI/AVH. Pt rates cravings 8, states his withdrawal sx are tremors, nausea, and cramps.

## 2024-04-04 VITALS
WEIGHT: 256.8 LBS | HEART RATE: 86 BPM | BODY MASS INDEX: 32.96 KG/M2 | SYSTOLIC BLOOD PRESSURE: 113 MMHG | DIASTOLIC BLOOD PRESSURE: 69 MMHG | TEMPERATURE: 98.3 F | RESPIRATION RATE: 16 BRPM | HEIGHT: 74 IN | OXYGEN SATURATION: 93 %

## 2024-04-04 PROCEDURE — 94760 N-INVAS EAR/PLS OXIMETRY 1: CPT

## 2024-04-04 PROCEDURE — 99239 HOSP IP/OBS DSCHRG MGMT >30: CPT | Performed by: PSYCHIATRY & NEUROLOGY

## 2024-04-04 PROCEDURE — 94799 UNLISTED PULMONARY SVC/PX: CPT

## 2024-04-04 PROCEDURE — 94664 DEMO&/EVAL PT USE INHALER: CPT

## 2024-04-04 RX ADMIN — CARIPRAZINE 1.5 MG: 1.5 CAPSULE, GELATIN COATED ORAL at 08:17

## 2024-04-04 RX ADMIN — BUSPIRONE HYDROCHLORIDE 10 MG: 10 TABLET ORAL at 08:17

## 2024-04-04 RX ADMIN — TIOTROPIUM BROMIDE INHALATION SPRAY 2 PUFF: 3.12 SPRAY, METERED RESPIRATORY (INHALATION) at 08:24

## 2024-04-04 RX ADMIN — HYDROXYZINE HYDROCHLORIDE 50 MG: 50 TABLET ORAL at 08:18

## 2024-04-04 RX ADMIN — Medication 2 TABLET: at 08:18

## 2024-04-04 RX ADMIN — QUETIAPINE FUMARATE 50 MG: 100 TABLET ORAL at 08:17

## 2024-04-04 RX ADMIN — Medication 1 TABLET: at 08:17

## 2024-04-04 RX ADMIN — ACETAMINOPHEN 650 MG: 325 TABLET ORAL at 08:18

## 2024-04-04 RX ADMIN — PANTOPRAZOLE SODIUM 40 MG: 40 TABLET, DELAYED RELEASE ORAL at 08:17

## 2024-04-04 RX ADMIN — Medication 100 MG: at 08:18

## 2024-04-04 RX ADMIN — BUDESONIDE AND FORMOTEROL FUMARATE DIHYDRATE 2 PUFF: 160; 4.5 AEROSOL RESPIRATORY (INHALATION) at 08:23

## 2024-04-04 RX ADMIN — MAGNESIUM GLUCONATE 500 MG ORAL TABLET 400 MG: 500 TABLET ORAL at 08:17

## 2024-04-04 RX ADMIN — DULOXETINE HYDROCHLORIDE 60 MG: 60 CAPSULE, DELAYED RELEASE ORAL at 08:17

## 2024-04-04 RX ADMIN — LISINOPRIL 20 MG: 10 TABLET ORAL at 08:17

## 2024-04-04 NOTE — PLAN OF CARE
"Goal Outcome Evaluation:  Plan of Care Reviewed With: patient  Patient Agreement with Plan of Care: agrees     Progress: improving  Outcome Evaluation: Pt reports feeling \"ok\" and rates anxiety 4/10, depression 2/10 and craving 3/10. Pt reports good sleep and appetite and primary w/d sx as tremors and sweats/diaphoresis. Pt denies SI/HI/AVH.    Pt appeared to sleep throughout the night, approximately 8 hours this shift.                           "

## 2024-04-04 NOTE — PLAN OF CARE
Problem: Adult Behavioral Health Plan of Care  Goal: Plan of Care Review  Outcome: Met  Flowsheets (Taken 4/4/2024 0845)  Plan of Care Reviewed With: patient  Patient Agreement with Plan of Care: agrees  Goal: Patient-Specific Goal (Individualization)  Outcome: Met  Goal: Adheres to Safety Considerations for Self and Others  Outcome: Met  Intervention: Develop and Maintain Individualized Safety Plan  Recent Flowsheet Documentation  Taken 4/4/2024 1400 by Felicia Lam RN  Safety Measures:   environmental rounds completed   safety plan reviewed   safety rounds completed  Taken 4/4/2024 1200 by Felicia Lam RN  Safety Measures:   environmental rounds completed   safety plan reviewed   safety rounds completed  Taken 4/4/2024 1000 by Felicia Lam RN  Safety Measures:   environmental rounds completed   safety plan reviewed   safety rounds completed  Taken 4/4/2024 0957 by Felicia Lam RN  Safety Measures:   environmental rounds completed   safety plan reviewed   safety rounds completed  Goal: Absence of New-Onset Illness or Injury  Outcome: Met  Intervention: Identify and Manage Fall Risk  Recent Flowsheet Documentation  Taken 4/4/2024 1400 by Felicia Lam RN  Safety Measures:   environmental rounds completed   safety plan reviewed   safety rounds completed  Taken 4/4/2024 1200 by Felicia Lam RN  Safety Measures:   environmental rounds completed   safety plan reviewed   safety rounds completed  Taken 4/4/2024 1000 by Felicia Lam RN  Safety Measures:   environmental rounds completed   safety plan reviewed   safety rounds completed  Taken 4/4/2024 0957 by Felicia Lam RN  Safety Measures:   environmental rounds completed   safety plan reviewed   safety rounds completed  Goal: Optimized Coping Skills in Response to Life Stressors  Outcome: Met  Intervention: Promote Effective Coping Strategies  Recent Flowsheet Documentation  Taken 4/4/2024 0845 by Genaro  Felicia Bautista, RN  Supportive Measures: active listening utilized  Goal: Develops/Participates in Therapeutic [place] to Support Successful Transition  Outcome: Met  Intervention: Foster Therapeutic [place]  Recent Flowsheet Documentation  Taken 4/4/2024 0845 by Felicia Lam, RN  Trust Relationship/Rapport: care explained   Goal Outcome Evaluation:  Plan of Care Reviewed With: patient  Patient Agreement with Plan of Care: agrees

## 2024-04-04 NOTE — DISCHARGE SUMMARY
":  1970  MRN:  [id number]  Visit Number:  [id number]      Date of Admission:2024   Date of Discharge:  2024    Discharge Diagnosis:  Active Problems:    Nicotine use disorder    Alcohol use disorder, severe, dependence    COPD (chronic obstructive pulmonary disease)    Depressive disorder unspecified      Admission Diagnosis:  Alcohol abuse [F10.10]     HPI  Leonard Crandall is a 53 y.o. male who was admitted on 2024 with complaints of alcohol use and withdrawals.   For details please see H&P dated 4/3/24    Hospital Course  Patient is a 53 y.o. male presented with alcohol use disorder. The patient was transferred back from CCU where he was admitted two days prior from the detox unit as he had developed delirium tremens. He came back to the detox unit on 24 to complete his detox regimen. The patient was continued on Ativan detox and he was able to complete the remaining part of his detox regimen without any adverse events. He was continued on his other medications. He was encouraged to stay one more day but he had plans to go to rehab and the rehab staff had come over to pick him up. As he was doing well and didn't experience or exhibit further withdrawal symptoms he was deemed safe to discharge and continue outpatient treatment.       Mental Status Exam upon discharge:   Mood \"good\"   Affect-congruent, appropriate, stable  Thought Content-goal directed, no delusional material present  Thought process-linear, organized.  Suicidality: No SI  Homicidality: No HI  Perception: No AH/VH    Procedures Performed         Consults:   Consults       No orders found from 3/4/2024 to 4/3/2024.            Pertinent Test Results:   Admission on 2024   Component Date Value Ref Range Status    Hepatitis B Surface Ag 2024 Non-Reactive  Non-Reactive Final    Hep A IgM 2024 Non-Reactive  Non-Reactive Final    Hep B C IgM 2024 Non-Reactive  Non-Reactive Final    Hepatitis C Ab 2024 " Non-Reactive  Non-Reactive Final    QT Interval 04/02/2024 404  ms Final    QTC Interval 04/02/2024 416  ms Final   Admission on 04/01/2024, Discharged on 04/02/2024   Component Date Value Ref Range Status    Glucose 04/02/2024 91  65 - 99 mg/dL Final    BUN 04/02/2024 12  6 - 20 mg/dL Final    Creatinine 04/02/2024 0.87  0.76 - 1.27 mg/dL Final    Sodium 04/02/2024 136  136 - 145 mmol/L Final    Potassium 04/02/2024 4.2  3.5 - 5.2 mmol/L Final    Slight hemolysis detected by analyzer. Result may be falsely elevated.    Chloride 04/02/2024 103  98 - 107 mmol/L Final    CO2 04/02/2024 23.9  22.0 - 29.0 mmol/L Final    Calcium 04/02/2024 8.6  8.6 - 10.5 mg/dL Final    Total Protein 04/02/2024 5.7 (L)  6.0 - 8.5 g/dL Final    Albumin 04/02/2024 3.6  3.5 - 5.2 g/dL Final    ALT (SGPT) 04/02/2024 26  1 - 41 U/L Final    AST (SGOT) 04/02/2024 26  1 - 40 U/L Final    Alkaline Phosphatase 04/02/2024 64  39 - 117 U/L Final    Total Bilirubin 04/02/2024 0.2  0.0 - 1.2 mg/dL Final    Globulin 04/02/2024 2.1  gm/dL Final    A/G Ratio 04/02/2024 1.7  g/dL Final    BUN/Creatinine Ratio 04/02/2024 13.8  7.0 - 25.0 Final    Anion Gap 04/02/2024 9.1  5.0 - 15.0 mmol/L Final    eGFR 04/02/2024 104.5  >60.0 mL/min/1.73 Final    WBC 04/02/2024 8.20  3.40 - 10.80 10*3/mm3 Final    RBC 04/02/2024 4.60  4.14 - 5.80 10*6/mm3 Final    Hemoglobin 04/02/2024 14.8  13.0 - 17.7 g/dL Final    Hematocrit 04/02/2024 44.4  37.5 - 51.0 % Final    MCV 04/02/2024 96.5  79.0 - 97.0 fL Final    MCH 04/02/2024 32.2  26.6 - 33.0 pg Final    MCHC 04/02/2024 33.3  31.5 - 35.7 g/dL Final    RDW 04/02/2024 13.6  12.3 - 15.4 % Final    RDW-SD 04/02/2024 48.8  37.0 - 54.0 fl Final    MPV 04/02/2024 9.2  6.0 - 12.0 fL Final    Platelets 04/02/2024 197  140 - 450 10*3/mm3 Final    Neutrophil % 04/02/2024 51.9  42.7 - 76.0 % Final    Lymphocyte % 04/02/2024 32.1  19.6 - 45.3 % Final    Monocyte % 04/02/2024 8.5  5.0 - 12.0 % Final    Eosinophil % 04/02/2024 6.7  (H)  0.3 - 6.2 % Final    Basophil % 04/02/2024 0.6  0.0 - 1.5 % Final    Immature Grans % 04/02/2024 0.2  0.0 - 0.5 % Final    Neutrophils, Absolute 04/02/2024 4.25  1.70 - 7.00 10*3/mm3 Final    Lymphocytes, Absolute 04/02/2024 2.63  0.70 - 3.10 10*3/mm3 Final    Monocytes, Absolute 04/02/2024 0.70  0.10 - 0.90 10*3/mm3 Final    Eosinophils, Absolute 04/02/2024 0.55 (H)  0.00 - 0.40 10*3/mm3 Final    Basophils, Absolute 04/02/2024 0.05  0.00 - 0.20 10*3/mm3 Final    Immature Grans, Absolute 04/02/2024 0.02  0.00 - 0.05 10*3/mm3 Final    nRBC 04/02/2024 0.0  0.0 - 0.2 /100 WBC Final   Admission on 03/28/2024, Discharged on 04/01/2024   Component Date Value Ref Range Status    Glucose 04/01/2024 125 (H)  65 - 99 mg/dL Final    BUN 04/01/2024 11  6 - 20 mg/dL Final    Creatinine 04/01/2024 0.89  0.76 - 1.27 mg/dL Final    Sodium 04/01/2024 135 (L)  136 - 145 mmol/L Final    Potassium 04/01/2024 4.1  3.5 - 5.2 mmol/L Final    Slight hemolysis detected by analyzer. Result may be falsely elevated.    Chloride 04/01/2024 104  98 - 107 mmol/L Final    CO2 04/01/2024 22.1  22.0 - 29.0 mmol/L Final    Calcium 04/01/2024 9.0  8.6 - 10.5 mg/dL Final    Total Protein 04/01/2024 6.1  6.0 - 8.5 g/dL Final    Albumin 04/01/2024 3.7  3.5 - 5.2 g/dL Final    ALT (SGPT) 04/01/2024 21  1 - 41 U/L Final    AST (SGOT) 04/01/2024 23  1 - 40 U/L Final    Alkaline Phosphatase 04/01/2024 77  39 - 117 U/L Final    Total Bilirubin 04/01/2024 0.2  0.0 - 1.2 mg/dL Final    Globulin 04/01/2024 2.4  gm/dL Final    A/G Ratio 04/01/2024 1.5  g/dL Final    BUN/Creatinine Ratio 04/01/2024 12.4  7.0 - 25.0 Final    Anion Gap 04/01/2024 8.9  5.0 - 15.0 mmol/L Final    eGFR 04/01/2024 103.8  >60.0 mL/min/1.73 Final    WBC 04/01/2024 7.23  3.40 - 10.80 10*3/mm3 Final    RBC 04/01/2024 4.34  4.14 - 5.80 10*6/mm3 Final    Hemoglobin 04/01/2024 14.1  13.0 - 17.7 g/dL Final    Hematocrit 04/01/2024 42.0  37.5 - 51.0 % Final    MCV 04/01/2024 96.8  79.0 -  97.0 fL Final    MCH 04/01/2024 32.5  26.6 - 33.0 pg Final    MCHC 04/01/2024 33.6  31.5 - 35.7 g/dL Final    RDW 04/01/2024 13.6  12.3 - 15.4 % Final    RDW-SD 04/01/2024 48.3  37.0 - 54.0 fl Final    MPV 04/01/2024 9.5  6.0 - 12.0 fL Final    Platelets 04/01/2024 226  140 - 450 10*3/mm3 Final    Neutrophil % 04/01/2024 51.3  42.7 - 76.0 % Final    Lymphocyte % 04/01/2024 32.5  19.6 - 45.3 % Final    Monocyte % 04/01/2024 9.7  5.0 - 12.0 % Final    Eosinophil % 04/01/2024 5.5  0.3 - 6.2 % Final    Basophil % 04/01/2024 0.7  0.0 - 1.5 % Final    Immature Grans % 04/01/2024 0.3  0.0 - 0.5 % Final    Neutrophils, Absolute 04/01/2024 3.71  1.70 - 7.00 10*3/mm3 Final    Lymphocytes, Absolute 04/01/2024 2.35  0.70 - 3.10 10*3/mm3 Final    Monocytes, Absolute 04/01/2024 0.70  0.10 - 0.90 10*3/mm3 Final    Eosinophils, Absolute 04/01/2024 0.40  0.00 - 0.40 10*3/mm3 Final    Basophils, Absolute 04/01/2024 0.05  0.00 - 0.20 10*3/mm3 Final    Immature Grans, Absolute 04/01/2024 0.02  0.00 - 0.05 10*3/mm3 Final    nRBC 04/01/2024 0.0  0.0 - 0.2 /100 WBC Final   Admission on 03/28/2024, Discharged on 03/28/2024   Component Date Value Ref Range Status    Glucose 03/28/2024 116 (H)  65 - 99 mg/dL Final    BUN 03/28/2024 9  6 - 20 mg/dL Final    Creatinine 03/28/2024 0.94  0.76 - 1.27 mg/dL Final    Sodium 03/28/2024 136  136 - 145 mmol/L Final    Potassium 03/28/2024 3.8  3.5 - 5.2 mmol/L Final    Slight hemolysis detected by analyzer. Result may be falsely elevated.    Chloride 03/28/2024 100  98 - 107 mmol/L Final    CO2 03/28/2024 22.5  22.0 - 29.0 mmol/L Final    Calcium 03/28/2024 9.0  8.6 - 10.5 mg/dL Final    Total Protein 03/28/2024 7.3  6.0 - 8.5 g/dL Final    Albumin 03/28/2024 4.4  3.5 - 5.2 g/dL Final    ALT (SGPT) 03/28/2024 36  1 - 41 U/L Final    AST (SGOT) 03/28/2024 35  1 - 40 U/L Final    Alkaline Phosphatase 03/28/2024 93  39 - 117 U/L Final    Total Bilirubin 03/28/2024 0.7  0.0 - 1.2 mg/dL Final    Globulin  03/28/2024 2.9  gm/dL Final    A/G Ratio 03/28/2024 1.5  g/dL Final    BUN/Creatinine Ratio 03/28/2024 9.6  7.0 - 25.0 Final    Anion Gap 03/28/2024 13.5  5.0 - 15.0 mmol/L Final    eGFR 03/28/2024 98.1  >60.0 mL/min/1.73 Final    Color, UA 03/28/2024 Dark Yellow (A)  Yellow, Straw Final    Appearance, UA 03/28/2024 Clear  Clear Final    pH, UA 03/28/2024 7.0  5.0 - 8.0 Final    Specific Gravity, UA 03/28/2024 >1.030 (H)  1.005 - 1.030 Final    Glucose, UA 03/28/2024 Negative  Negative Final    Ketones, UA 03/28/2024 Trace (A)  Negative Final    Bilirubin, UA 03/28/2024 Small (1+) (A)  Negative Final    Blood, UA 03/28/2024 Negative  Negative Final    Protein, UA 03/28/2024 100 mg/dL (2+) (A)  Negative Final    Leuk Esterase, UA 03/28/2024 Trace (A)  Negative Final    Nitrite, UA 03/28/2024 Negative  Negative Final    Urobilinogen, UA 03/28/2024 1.0 E.U./dL  0.2 - 1.0 E.U./dL Final    THC, Screen, Urine 03/28/2024 Positive (A)  Negative Final    Phencyclidine (PCP), Urine 03/28/2024 Negative  Negative Final    Cocaine Screen, Urine 03/28/2024 Negative  Negative Final    Methamphetamine, Ur 03/28/2024 Negative  Negative Final    Opiate Screen 03/28/2024 Negative  Negative Final    Amphetamine Screen, Urine 03/28/2024 Negative  Negative Final    Benzodiazepine Screen, Urine 03/28/2024 Negative  Negative Final    Tricyclic Antidepressants Screen 03/28/2024 Negative  Negative Final    Methadone Screen, Urine 03/28/2024 Negative  Negative Final    Barbiturates Screen, Urine 03/28/2024 Negative  Negative Final    Oxycodone Screen, Urine 03/28/2024 Negative  Negative Final    Buprenorphine, Screen, Urine 03/28/2024 Negative  Negative Final    Ethanol 03/28/2024 <10  0 - 10 mg/dL Final    Ethanol % 03/28/2024 <0.010  % Final    Magnesium 03/28/2024 1.7  1.6 - 2.6 mg/dL Final    WBC 03/28/2024 10.60  3.40 - 10.80 10*3/mm3 Final    RBC 03/28/2024 5.13  4.14 - 5.80 10*6/mm3 Final    Hemoglobin 03/28/2024 16.4  13.0 - 17.7 g/dL  Final    Hematocrit 03/28/2024 48.7  37.5 - 51.0 % Final    MCV 03/28/2024 94.9  79.0 - 97.0 fL Final    MCH 03/28/2024 32.0  26.6 - 33.0 pg Final    MCHC 03/28/2024 33.7  31.5 - 35.7 g/dL Final    RDW 03/28/2024 13.9  12.3 - 15.4 % Final    RDW-SD 03/28/2024 49.1  37.0 - 54.0 fl Final    MPV 03/28/2024 9.1  6.0 - 12.0 fL Final    Platelets 03/28/2024 241  140 - 450 10*3/mm3 Final    Neutrophil % 03/28/2024 60.0  42.7 - 76.0 % Final    Lymphocyte % 03/28/2024 25.8  19.6 - 45.3 % Final    Monocyte % 03/28/2024 11.5  5.0 - 12.0 % Final    Eosinophil % 03/28/2024 1.7  0.3 - 6.2 % Final    Basophil % 03/28/2024 0.8  0.0 - 1.5 % Final    Immature Grans % 03/28/2024 0.2  0.0 - 0.5 % Final    Neutrophils, Absolute 03/28/2024 6.36  1.70 - 7.00 10*3/mm3 Final    Lymphocytes, Absolute 03/28/2024 2.74  0.70 - 3.10 10*3/mm3 Final    Monocytes, Absolute 03/28/2024 1.22 (H)  0.10 - 0.90 10*3/mm3 Final    Eosinophils, Absolute 03/28/2024 0.18  0.00 - 0.40 10*3/mm3 Final    Basophils, Absolute 03/28/2024 0.08  0.00 - 0.20 10*3/mm3 Final    Immature Grans, Absolute 03/28/2024 0.02  0.00 - 0.05 10*3/mm3 Final    nRBC 03/28/2024 0.0  0.0 - 0.2 /100 WBC Final    Fentanyl, Urine 03/28/2024 Negative  Negative Final    RBC, UA 03/28/2024 0-2  None Seen, 0-2 /HPF Final    WBC, UA 03/28/2024 3-5 (A)  None Seen, 0-2 /HPF Final    Bacteria, UA 03/28/2024 Trace (A)  None Seen /HPF Final    Squamous Epithelial Cells, UA 03/28/2024 0-2  None Seen, 0-2 /HPF Final    Hyaline Casts, UA 03/28/2024 None Seen  None Seen /LPF Final    Mucus, UA 03/28/2024 Moderate/2+ (A)  None Seen, Trace /HPF Final    Methodology 03/28/2024 Automated Microscopy   Final    QT Interval 03/28/2024 364  ms Final    QTC Interval 03/28/2024 462  ms Final        Condition on Discharge:  improved    Vital Signs  Temp:  [96.9 °F (36.1 °C)-98.3 °F (36.8 °C)] 98.3 °F (36.8 °C)  Heart Rate:  [74-96] 86  Resp:  [16-18] 16  BP: (105-155)/(60-90) 113/69      Discharge  Disposition:  Rehab Facility or Unit (DC - External)    Discharge Medications:     Discharge Medications        Continue These Medications        Instructions Start Date   albuterol sulfate  (90 Base) MCG/ACT inhaler  Commonly known as: PROVENTIL HFA;VENTOLIN HFA;PROAIR HFA   2 puffs, Inhalation, Every 4 Hours PRN      busPIRone 10 MG tablet  Commonly known as: BUSPAR   10 mg, Oral, 2 Times Daily      DULoxetine 60 MG capsule  Commonly known as: CYMBALTA   60 mg, Oral, Daily      Fluticasone-Salmeterol 250-50 MCG/ACT DISKUS  Commonly known as: ADVAIR/WIXELA   1 puff, Inhalation, 2 Times Daily - RT      folic acid 1 MG tablet  Commonly known as: FOLVITE   1 mg, Oral, Daily      lisinopril 20 MG tablet  Commonly known as: PRINIVIL,ZESTRIL   20 mg, Oral, Daily      magnesium oxide 400 MG tablet  Commonly known as: MAG-OX   400 mg, Oral, Daily      multivitamin tablet tablet   1 tablet, Oral, Daily      pantoprazole 40 MG EC tablet  Commonly known as: PROTONIX   40 mg, Oral, Daily      prazosin 2 MG capsule  Commonly known as: MINIPRESS   2 mg, Oral, Nightly      QUEtiapine 50 MG tablet  Commonly known as: SEROquel   50 mg, Oral, 3 Times Daily      Spiriva Respimat 1.25 MCG/ACT aerosol solution inhaler  Generic drug: Tiotropium Bromide Monohydrate   2 puffs, Inhalation, Daily - RT      thiamine 100 MG tablet  tablet  Commonly known as: VITAMIN B-1   100 mg, Oral, Daily      Vraylar 1.5 MG capsule capsule  Generic drug: Cariprazine HCl   1.5 mg, Oral, Daily             Stop These Medications      acetaminophen 650 MG 8 hr tablet  Commonly known as: TYLENOL     amitriptyline 50 MG tablet  Commonly known as: ELAVIL     methocarbamol 500 MG tablet  Commonly known as: ROBAXIN     Narcan 4 MG/0.1ML nasal spray  Generic drug: naloxone              Discharge Diet: Regular     Activity at Discharge: As tolerated     Follow-up Appointments  [facility] in [place]      Time: I spent  >30  minutes on this discharge activity  which included: face-to-face encounter with the patient, reviewing the data in the system, coordination of the care with the nursing staff as well as consultants, documentation, and entering orders.        Clinician:   Tj Smiley MD  04/04/24  15:44 EDT

## 2024-04-04 NOTE — PROGRESS NOTES
"INPATIENT PSYCHIATRIC PROGRESS NOTE    Name:  Leonard Crandall  :  1970  MRN:  [id number]  Visit Number:  [id number]  Length of stay:  2    SUBJECTIVE    CC/Focus of Exam: alcohol use    INTERVAL HISTORY:  The patient reports he is feeling better. He is on last day of detox and he agreed to complete the detox and then go tomorrow to rehab.     Review of Systems   Respiratory: Negative.     Cardiovascular: Negative.    Gastrointestinal: Negative.    Neurological:  Positive for tremors.       OBJECTIVE    Temp:  [96.9 °F (36.1 °C)-98.1 °F (36.7 °C)] 96.9 °F (36.1 °C)  Heart Rate:  [74-96] 79  Resp:  [16-18] 18  BP: (105-155)/(60-90) 144/90    MENTAL STATUS EXAM:  Appearance:Casually dressed, good hygeine.   Cooperation:Cooperative  Psychomotor: No psychomotor agitation/retardation, No EPS, No motor tics  Speech-normal rate, amount.  Mood \"better\"   Affect-congruent, appropriate, stable  Thought Content-goal directed, no delusional material present  Thought process-linear, organized.  Suicidality: No SI  Homicidality: No HI  Perception: No AH/VH  Insight-fair   Judgement-fair    Lab Results (last 24 hours)       ** No results found for the last 24 hours. **               Imaging Results (Last 24 Hours)       ** No results found for the last 24 hours. **               ECG/EMG Results (most recent)       Procedure Component Value Units Date/Time    ECG 12 Lead Other; Baseline Cardiac Status [[id number]] Collected: 24 1722     Updated: 24 1523     QT Interval 404 ms      QTC Interval 416 ms     Narrative:      Test Reason : Other~  Blood Pressure :   */*   mmHG  Vent. Rate :  64 BPM     Atrial Rate :  64 BPM     P-R Int : 140 ms          QRS Dur :  94 ms      QT Int : 404 ms       P-R-T Axes :  21  21  54 degrees     QTc Int : 416 ms    Normal sinus rhythm  Normal ECG  When compared with ECG of 28-MAR-2024 20:47,  NH interval has increased  Vent. rate has decreased BY  33 BPM  Confirmed by Jero, " Gloria () on 4/3/2024 3:20:18 PM    Referred By: JEFFERSON           Confirmed By: Gloria Nogueira             ALLERGIES: Fish-derived products    Medication Review:   Scheduled Medications:  B-complex with vitamin C, 2 tablet, Oral, Daily  budesonide-formoterol, 2 puff, Inhalation, BID - RT  busPIRone, 10 mg, Oral, BID  Cariprazine HCl, 1.5 mg, Oral, Daily  DULoxetine, 60 mg, Oral, Daily  lisinopril, 20 mg, Oral, Q24H  magnesium oxide, 400 mg, Oral, Daily  multivitamin with minerals, 1 tablet, Oral, Daily  nicotine, 1 patch, Transdermal, Q24H  pantoprazole, 40 mg, Oral, QAM AC  prazosin, 2 mg, Oral, Nightly  QUEtiapine, 50 mg, Oral, TID  thiamine, 100 mg, Oral, Daily  tiotropium bromide monohydrate, 2 puff, Inhalation, Daily - RT         PRN Medications:    acetaminophen    aluminum-magnesium hydroxide-simethicone    benzonatate    benztropine **OR** benztropine    famotidine    hydrOXYzine    ibuprofen    loperamide    [] LORazepam **FOLLOWED BY** LORazepam    magnesium hydroxide    ondansetron ODT    sodium chloride    traZODone   All medications reviewed.    ASSESSMENT & PLAN:      Alcohol use disorder, severe, dependence  -Continue Ativan detox       Nicotine use disorder  -Encourage cessation       COPD (chronic obstructive pulmonary disease)  -Symbicort  -Spiriva        Other recurrent depressive disorders  -Duloxetine  -Cariprazine  -Quetiapine  -Amitriptyline       Anxiety disorder unspecified  -Buspirone        HTN  -Lisinopril       GERD  -Protonix    Special precautions: Special Precautions Level 4 (q30 min checks).    Behavioral Health Treatment Plan and Problem List: I have reviewed and approved the Behavioral Health Treatment Plan and Problem list.  The patient has had a chance to review and agrees with the treatment plan.    Copied text in portions of this note has been reviewed and is accurate as of 24         Clinician:  Tj Smiley MD  24  13:05 EDT

## 2024-04-04 NOTE — DISCHARGE INSTR - APPOINTMENTS
[facility]  [address] [place] [address]  [phone]    Return at discharge

## 2024-04-04 NOTE — PLAN OF CARE
Goal Outcome Evaluation:        Problem: Adult Behavioral Health Plan of Care  Goal: Patient-Specific Goal (Individualization)  Outcome: Ongoing, Progressing  Flowsheets  Taken 4/3/2024 1003 by Nighat Orr CSW  Patient-Specific Goals (Include Timeframe): Identify 2-3 coping skills, address relapse prevention methods, complete aftercare plans, and deny SI/HI prior to discharge.  Individualized Care Needs: Therapist to offer 1-4 therapy sessions, aftercare planning, safety planning, family education, group therapy, and brief CBT/MI interventions.  Taken 4/3/2024 0958 by Nighat Orr CSW  Patient Personal Strengths:   resourceful   resilient   motivated for treatment   motivated for recovery   family/social support   stable living environment   spiritual/Buddhist support   positive vocational history   positive educational history   parenting skills   independent living skills  Patient Vulnerabilities:   history of unsuccessful treatment   poor impulse control   substance abuse/addiction   lacks insight into illness   occupational insecurity  Taken 4/2/2024 1502 by Karen Adkins RN  Anxieties, Fears or Concerns: None verbalized  Goal: Optimized Coping Skills in Response to Life Stressors  Outcome: Ongoing, Progressing  Flowsheets (Taken 4/3/2024 1003)  Optimized Coping Skills in Response to Life Stressors: making progress toward outcome  Intervention: Promote Effective Coping Strategies  Flowsheets (Taken 4/4/2024 1336)  Supportive Measures:   counseling provided   decision-making supported   goal-setting facilitated   verbalization of feelings encouraged   self-responsibility promoted   positive reinforcement provided   active listening utilized   self-reflection promoted   self-care encouraged  Goal: Develops/Participates in Therapeutic [place] to Support Successful Transition  Outcome: Ongoing, Progressing  Flowsheets (Taken 4/3/2024 1003)  Develops/Participates in Therapeutic [place] to  Support Successful Transition: making progress toward outcome  Intervention: Foster Therapeutic [place]  Flowsheets (Taken 4/4/2024 3265)  Trust Relationship/Rapport:   care explained   reassurance provided   choices provided   thoughts/feelings acknowledged   emotional support provided   empathic listening provided   questions answered   questions encouraged  Intervention: Mutually Develop Transition Plan  Flowsheets  Taken 4/4/2024 3273  Discharge Coordination/Progress: [place] has [place] Medicaid. Patient to return to [facility] at discharge.  Transportation Anticipated: agency  Transportation Concerns: none  Current Discharge Risk: substance use/abuse  Concerns to be Addressed:   substance/tobacco abuse/use   mental health   grief and loss  Readmission Within the Last 30 Days: no previous admission in last 30 days  Patient/Family Anticipated Services at Transition: rehabilitation services  Patient's Choice of Community Agency(s): [facility]  Patient/Family Anticipates Transition to: inpatient rehabilitation facility  Offered/Gave Vendor List: no  Taken 4/3/2024 1003  Outpatient/Agency/Support Group Needs: residential services  Transition Support:   follow-up care discussed   community resources reviewed   crisis management plan promoted   crisis management plan verbalized   follow-up care coordinated  Anticipated Discharge Disposition: residential substance use unit         DATA:  Therapist met with Patient individually this date. Patient agreeable to discuss current treatment progress and discharge concerns.     [facility] to [facility] at this time to provide update regarding patient's expected discharge date, no answer, left voicemail.    CLINICAL MANUVERING/INTERVENTIONS:  Assisted Patient in processing session content; acknowledged and normalized Patient’s thoughts, feelings, and concerns by utilizing a person-centered approach in efforts to build appropriate rapport and a positive therapeutic  relationship with open and honest communication. Allowed Patient to ventilate regarding current stressors and triggers for negative emotions and thoughts in a safe nonjudgmental environment with unconditional positive regard, active listening skills, and empathy. Therapist implemented motivational interviewing techniques to assist Patient with exploring personal growth and change and discussed distress tolerance skills, self soothing techniques, and applied cognitive behavioral strategies to facilitate identification of maladaptive patterns of thinking and behavior.Therapist utilized dialectical behavior techniques to teach and model emotional regulation and relaxation methods. Therapist assisted Patient with identifying and implementing healthier coping strategies. Therapist assisted Patient with safety planning; Patient agreed to continue honest communication with Treatment Team while inpatient and identify any SI/HI.  Patient encouraged to seek nearest ER or contact 911 if danger to self or others post discharge.     ASSESSMENT:  Patient continues to receive treatment for alcohol detox, expected to be stable for discharge tomorrow. Patient denies issues with mood, denies SI/HI/AVH. He reports experiencing tremors but overall, withdrawal symptoms have improved. Patient plans to return to [facility], agency to provide transportation. Reviewed healthy coping skills and relapse prevention.    PLAN:   Patient will continue stabilization. Patient will continue to receive services offered by Treatment Team.     Patient to return to [facility].

## 2024-04-05 NOTE — PAYOR COMM NOTE
"Leonard Crandall (53 y.o. Male)       Date of Birth   1970    Social Security Number       Address   [address] [facility] [address]    Home Phone   [phone]    MRN   [id number]       Restoration   None    Marital Status                               Admission Date   4/2/24    Admission Type   Urgent    Admitting Provider   John Mansfield MD    Attending Provider       Department, Room/Bed   [facility] ADULT CD, 1040/2S       Discharge Date   4/4/2024    Discharge Disposition   Rehab Facility or Unit (DC - External)    Discharge Destination   Home                              Attending Provider: (none)   Allergies: Fish-derived Products    Isolation: None   Infection: None   Code Status: Prior    Ht: 188 cm (74\")   Wt: 116 kg (256 lb 12.8 oz)    Admission Cmt: None   Principal Problem: None                  Active Insurance as of 4/2/2024       Primary Coverage       Payor Plan Insurance Group Employer/Plan Group    ANTHEM MEDICAID ANTHEM MEDICAID [id number]       Payor Plan Address Payor Plan Phone Number Payor Plan Fax Number Effective Dates    [address] [address] [phone]  12/6/2021 - None Entered    [facility] [id number]         Subscriber Name Subscriber Birth Date Member ID       LEONARD CRANDALL 1970 [id number]                     Emergency Contacts        (Rel.) Home Phone Work Phone Mobile Phone    Derrick Adkins (Friend) [phone] -- --    Talia Crandall (Daughter) [phone] -- --                    PLEASE ATTACH THIS DISCHARGE INFORMATION TO AUTH. # [id number]     DISCHARGE DATE:  04/04/2024    Discharge Diagnosis:  Active Problems:    Nicotine use disorder    Alcohol use disorder, severe, dependence (F10.20)    COPD (chronic obstructive pulmonary disease)    Depressive disorder unspecified (F32.A)    FOLLOW UP:  [facility]  [address] [place], [place] [id number] [phone]     Return at discharge     Medication " List  Medication List   Morning Afternoon Evening Bedtime As Needed   albuterol sulfate  (90 Base) MCG/ACT inhaler  Commonly known as: PROVENTIL HFA;VENTOLIN HFA;PROAIR HFA  Inhale 2 puffs Every 4 (Four) Hours As Needed for Wheezing or Shortness of Air.  For: Chronic Obstructive Lung Disease  Signed by: Jhon Khoury        busPIRone 10 MG tablet  Commonly known as: BUSPAR  Take 1 tablet by mouth 2 (Two) Times a Day. Indications: Anxiety Disorder  For: Anxiety Disorder  Last time this was given: 10 mg on April 4, 2024  8:17 AM        DULoxetine 60 MG capsule  Commonly known as: CYMBALTA  Take 1 capsule by mouth Daily. Indications: Generalized Anxiety Disorder  For: Generalized Anxiety Disorder  Last time this was given: 60 mg on April 4, 2024  8:17 AM        Fluticasone-Salmeterol 250-50 MCG/ACT DISKUS  Commonly known as: ADVAIR/WIXELA  Inhale 1 puff 2 (Two) Times a Day. Indications: Chronic Obstructive Lung Disease  For: Chronic Obstructive Lung Disease        folic acid 1 MG tablet  Commonly known as: FOLVITE  Take 1 tablet by mouth Daily.  For: Alcohol use        lisinopril 20 MG tablet  Commonly known as: PRINIVIL,ZESTRIL  Take 1 tablet by mouth Daily. Indications: High Blood Pressure Disorder  For: High Blood Pressure Disorder  Last time this was given: 20 mg on April 4, 2024  8:17 AM        magnesium oxide 400 MG tablet  Commonly known as: MAG-OX  Take 1 tablet by mouth Daily.  For: Disorder with Low Magnesium Levels  Last time this was given: 400 mg on April 4, 2024  8:17 AM        multivitamin tablet tablet  Take 1 tablet by mouth Daily.  For: Nutritional Support        pantoprazole 40 MG EC tablet  Commonly known as: PROTONIX  Take 1 tablet by mouth Daily. Indications: Indigestion  For: Indigestion  Last time this was given: 40 mg on April 4, 2024  8:17 AM        prazosin 2 MG capsule  Commonly known as: MINIPRESS  Take 1 capsule by mouth Every Night. Indications: High Blood Pressure Disorder  For:  High Blood Pressure Disorder  Last time this was given: 2 mg on April 3, 2024  9:14 PM        QUEtiapine 50 MG tablet  Commonly known as: SEROquel  Take 1 tablet by mouth 3 (Three) Times a Day. Indications: Major Depressive Disorder  For: Major Depressive Disorder  Last time this was given: 50 mg on 2024  8:17 AM        Spiriva Respimat 1.25 MCG/ACT aerosol solution inhaler  Inhale 2 puffs Daily. Indications: copd  Generic drug: Tiotropium Bromide Monohydrate  For: copd  Last time this was given: Ask your nurse or doctor        thiamine 100 MG tablet  tablet  Commonly known as: VITAMIN B-1  Take 1 tablet by mouth Daily.  For: Alcohol use  Last time this was given: 100 mg on 2024  8:18 AM        Vraylar 1.5 MG capsule capsule  Take 1 capsule by mouth Daily. Indications: Major Depressive Disorder  Generic drug: Cariprazine HCl  For: Major Depressive Disorder  Last time this was given: 1.5 mg on 2024  8:17 AM                  Tj Smiley MD   Physician  Psychiatry     Discharge Summary     Signed     Date of Service: 24  Creation Time: 24     Signed         :  1970  MRN:  [id number]  Visit Number:  [id number]        Date of Admission:2024   Date of Discharge:  2024     Discharge Diagnosis:  Active Problems:    Nicotine use disorder    Alcohol use disorder, severe, dependence    COPD (chronic obstructive pulmonary disease)    Depressive disorder unspecified        Admission Diagnosis:  Alcohol abuse [F10.10]                HPI  Leonard Crandall is a 53 y.o. male who was admitted on 2024 with complaints of alcohol use and withdrawals.   For details please see H&P dated 4/3/24     Hospital Course  Patient is a 53 y.o. male presented with alcohol use disorder. The patient was transferred back from CCU where he was admitted two days prior from the detox unit as he had developed delirium tremens. He came back to the detox unit on 24 to complete his  "detox regimen. The patient was continued on Ativan detox and he was able to complete the remaining part of his detox regimen without any adverse events. He was continued on his other medications. He was encouraged to stay one more day but he had plans to go to rehab and the rehab staff had come over to pick him up. As he was doing well and didn't experience or exhibit further withdrawal symptoms he was deemed safe to discharge and continue outpatient treatment.        Mental Status Exam upon discharge:   Mood \"good\"   Affect-congruent, appropriate, stable  Thought Content-goal directed, no delusional material present  Thought process-linear, organized.  Suicidality: No SI  Homicidality: No HI  Perception: No AH/VH     Procedures Performed        Consults:   Consults         No orders found from 3/4/2024 to 4/3/2024.                Pertinent Test Results:           Admission on 04/02/2024   Component Date Value Ref Range Status    Hepatitis B Surface Ag 04/02/2024 Non-Reactive  Non-Reactive Final    Hep A IgM 04/02/2024 Non-Reactive  Non-Reactive Final    Hep B C IgM 04/02/2024 Non-Reactive  Non-Reactive Final    Hepatitis C Ab 04/02/2024 Non-Reactive  Non-Reactive Final    QT Interval 04/02/2024 404  ms Final    QTC Interval 04/02/2024 416  ms Final   Admission on 04/01/2024, Discharged on 04/02/2024   Component Date Value Ref Range Status    Glucose 04/02/2024 91  65 - 99 mg/dL Final    BUN 04/02/2024 12  6 - 20 mg/dL Final    Creatinine 04/02/2024 0.87  0.76 - 1.27 mg/dL Final    Sodium 04/02/2024 136  136 - 145 mmol/L Final    Potassium 04/02/2024 4.2  3.5 - 5.2 mmol/L Final     Slight hemolysis detected by analyzer. Result may be falsely elevated.    Chloride 04/02/2024 103  98 - 107 mmol/L Final    CO2 04/02/2024 23.9  22.0 - 29.0 mmol/L Final    Calcium 04/02/2024 8.6  8.6 - 10.5 mg/dL Final    Total Protein 04/02/2024 5.7 (L)  6.0 - 8.5 g/dL Final    Albumin 04/02/2024 3.6  3.5 - 5.2 g/dL Final    ALT (SGPT) " 04/02/2024 26  1 - 41 U/L Final    AST (SGOT) 04/02/2024 26  1 - 40 U/L Final    Alkaline Phosphatase 04/02/2024 64  39 - 117 U/L Final    Total Bilirubin 04/02/2024 0.2  0.0 - 1.2 mg/dL Final    Globulin 04/02/2024 2.1  gm/dL Final    A/G Ratio 04/02/2024 1.7  g/dL Final    BUN/Creatinine Ratio 04/02/2024 13.8  7.0 - 25.0 Final    Anion Gap 04/02/2024 9.1  5.0 - 15.0 mmol/L Final    eGFR 04/02/2024 104.5  >60.0 mL/min/1.73 Final    WBC 04/02/2024 8.20  3.40 - 10.80 10*3/mm3 Final    RBC 04/02/2024 4.60  4.14 - 5.80 10*6/mm3 Final    Hemoglobin 04/02/2024 14.8  13.0 - 17.7 g/dL Final    Hematocrit 04/02/2024 44.4  37.5 - 51.0 % Final    MCV 04/02/2024 96.5  79.0 - 97.0 fL Final    MCH 04/02/2024 32.2  26.6 - 33.0 pg Final    MCHC 04/02/2024 33.3  31.5 - 35.7 g/dL Final    RDW 04/02/2024 13.6  12.3 - 15.4 % Final    RDW-SD 04/02/2024 48.8  37.0 - 54.0 fl Final    MPV 04/02/2024 9.2  6.0 - 12.0 fL Final    Platelets 04/02/2024 197  140 - 450 10*3/mm3 Final    Neutrophil % 04/02/2024 51.9  42.7 - 76.0 % Final    Lymphocyte % 04/02/2024 32.1  19.6 - 45.3 % Final    Monocyte % 04/02/2024 8.5  5.0 - 12.0 % Final    Eosinophil % 04/02/2024 6.7 (H)  0.3 - 6.2 % Final    Basophil % 04/02/2024 0.6  0.0 - 1.5 % Final    Immature Grans % 04/02/2024 0.2  0.0 - 0.5 % Final    Neutrophils, Absolute 04/02/2024 4.25  1.70 - 7.00 10*3/mm3 Final    Lymphocytes, Absolute 04/02/2024 2.63  0.70 - 3.10 10*3/mm3 Final    Monocytes, Absolute 04/02/2024 0.70  0.10 - 0.90 10*3/mm3 Final    Eosinophils, Absolute 04/02/2024 0.55 (H)  0.00 - 0.40 10*3/mm3 Final    Basophils, Absolute 04/02/2024 0.05  0.00 - 0.20 10*3/mm3 Final    Immature Grans, Absolute 04/02/2024 0.02  0.00 - 0.05 10*3/mm3 Final    nRBC 04/02/2024 0.0  0.0 - 0.2 /100 WBC Final   Admission on 03/28/2024, Discharged on 04/01/2024   Component Date Value Ref Range Status    Glucose 04/01/2024 125 (H)  65 - 99 mg/dL Final    BUN 04/01/2024 11  6 - 20 mg/dL Final    Creatinine  04/01/2024 0.89  0.76 - 1.27 mg/dL Final    Sodium 04/01/2024 135 (L)  136 - 145 mmol/L Final    Potassium 04/01/2024 4.1  3.5 - 5.2 mmol/L Final     Slight hemolysis detected by analyzer. Result may be falsely elevated.    Chloride 04/01/2024 104  98 - 107 mmol/L Final    CO2 04/01/2024 22.1  22.0 - 29.0 mmol/L Final    Calcium 04/01/2024 9.0  8.6 - 10.5 mg/dL Final    Total Protein 04/01/2024 6.1  6.0 - 8.5 g/dL Final    Albumin 04/01/2024 3.7  3.5 - 5.2 g/dL Final    ALT (SGPT) 04/01/2024 21  1 - 41 U/L Final    AST (SGOT) 04/01/2024 23  1 - 40 U/L Final    Alkaline Phosphatase 04/01/2024 77  39 - 117 U/L Final    Total Bilirubin 04/01/2024 0.2  0.0 - 1.2 mg/dL Final    Globulin 04/01/2024 2.4  gm/dL Final    A/G Ratio 04/01/2024 1.5  g/dL Final    BUN/Creatinine Ratio 04/01/2024 12.4  7.0 - 25.0 Final    Anion Gap 04/01/2024 8.9  5.0 - 15.0 mmol/L Final    eGFR 04/01/2024 103.8  >60.0 mL/min/1.73 Final    WBC 04/01/2024 7.23  3.40 - 10.80 10*3/mm3 Final    RBC 04/01/2024 4.34  4.14 - 5.80 10*6/mm3 Final    Hemoglobin 04/01/2024 14.1  13.0 - 17.7 g/dL Final    Hematocrit 04/01/2024 42.0  37.5 - 51.0 % Final    MCV 04/01/2024 96.8  79.0 - 97.0 fL Final    MCH 04/01/2024 32.5  26.6 - 33.0 pg Final    MCHC 04/01/2024 33.6  31.5 - 35.7 g/dL Final    RDW 04/01/2024 13.6  12.3 - 15.4 % Final    RDW-SD 04/01/2024 48.3  37.0 - 54.0 fl Final    MPV 04/01/2024 9.5  6.0 - 12.0 fL Final    Platelets 04/01/2024 226  140 - 450 10*3/mm3 Final    Neutrophil % 04/01/2024 51.3  42.7 - 76.0 % Final    Lymphocyte % 04/01/2024 32.5  19.6 - 45.3 % Final    Monocyte % 04/01/2024 9.7  5.0 - 12.0 % Final    Eosinophil % 04/01/2024 5.5  0.3 - 6.2 % Final    Basophil % 04/01/2024 0.7  0.0 - 1.5 % Final    Immature Grans % 04/01/2024 0.3  0.0 - 0.5 % Final    Neutrophils, Absolute 04/01/2024 3.71  1.70 - 7.00 10*3/mm3 Final    Lymphocytes, Absolute 04/01/2024 2.35  0.70 - 3.10 10*3/mm3 Final    Monocytes, Absolute 04/01/2024 0.70  0.10 -  0.90 10*3/mm3 Final    Eosinophils, Absolute 04/01/2024 0.40  0.00 - 0.40 10*3/mm3 Final    Basophils, Absolute 04/01/2024 0.05  0.00 - 0.20 10*3/mm3 Final    Immature Grans, Absolute 04/01/2024 0.02  0.00 - 0.05 10*3/mm3 Final    nRBC 04/01/2024 0.0  0.0 - 0.2 /100 WBC Final   Admission on 03/28/2024, Discharged on 03/28/2024   Component Date Value Ref Range Status    Glucose 03/28/2024 116 (H)  65 - 99 mg/dL Final    BUN 03/28/2024 9  6 - 20 mg/dL Final    Creatinine 03/28/2024 0.94  0.76 - 1.27 mg/dL Final    Sodium 03/28/2024 136  136 - 145 mmol/L Final    Potassium 03/28/2024 3.8  3.5 - 5.2 mmol/L Final     Slight hemolysis detected by analyzer. Result may be falsely elevated.    Chloride 03/28/2024 100  98 - 107 mmol/L Final    CO2 03/28/2024 22.5  22.0 - 29.0 mmol/L Final    Calcium 03/28/2024 9.0  8.6 - 10.5 mg/dL Final    Total Protein 03/28/2024 7.3  6.0 - 8.5 g/dL Final    Albumin 03/28/2024 4.4  3.5 - 5.2 g/dL Final    ALT (SGPT) 03/28/2024 36  1 - 41 U/L Final    AST (SGOT) 03/28/2024 35  1 - 40 U/L Final    Alkaline Phosphatase 03/28/2024 93  39 - 117 U/L Final    Total Bilirubin 03/28/2024 0.7  0.0 - 1.2 mg/dL Final    Globulin 03/28/2024 2.9  gm/dL Final    A/G Ratio 03/28/2024 1.5  g/dL Final    BUN/Creatinine Ratio 03/28/2024 9.6  7.0 - 25.0 Final    Anion Gap 03/28/2024 13.5  5.0 - 15.0 mmol/L Final    eGFR 03/28/2024 98.1  >60.0 mL/min/1.73 Final    Color, UA 03/28/2024 Dark Yellow (A)  Yellow, Straw Final    Appearance, UA 03/28/2024 Clear  Clear Final    pH, UA 03/28/2024 7.0  5.0 - 8.0 Final    Specific Gravity, UA 03/28/2024 >1.030 (H)  1.005 - 1.030 Final    Glucose, UA 03/28/2024 Negative  Negative Final    Ketones, UA 03/28/2024 Trace (A)  Negative Final    Bilirubin, UA 03/28/2024 Small (1+) (A)  Negative Final    Blood, UA 03/28/2024 Negative  Negative Final    Protein, UA 03/28/2024 100 mg/dL (2+) (A)  Negative Final    Leuk Esterase, UA 03/28/2024 Trace (A)  Negative Final    Nitrite,  UA 03/28/2024 Negative  Negative Final    Urobilinogen, UA 03/28/2024 1.0 E.U./dL  0.2 - 1.0 E.U./dL Final    THC, Screen, Urine 03/28/2024 Positive (A)  Negative Final    Phencyclidine (PCP), Urine 03/28/2024 Negative  Negative Final    Cocaine Screen, Urine 03/28/2024 Negative  Negative Final    Methamphetamine, Ur 03/28/2024 Negative  Negative Final    Opiate Screen 03/28/2024 Negative  Negative Final    Amphetamine Screen, Urine 03/28/2024 Negative  Negative Final    Benzodiazepine Screen, Urine 03/28/2024 Negative  Negative Final    Tricyclic Antidepressants Screen 03/28/2024 Negative  Negative Final    Methadone Screen, Urine 03/28/2024 Negative  Negative Final    Barbiturates Screen, Urine 03/28/2024 Negative  Negative Final    Oxycodone Screen, Urine 03/28/2024 Negative  Negative Final    Buprenorphine, Screen, Urine 03/28/2024 Negative  Negative Final    Ethanol 03/28/2024 <10  0 - 10 mg/dL Final    Ethanol % 03/28/2024 <0.010  % Final    Magnesium 03/28/2024 1.7  1.6 - 2.6 mg/dL Final    WBC 03/28/2024 10.60  3.40 - 10.80 10*3/mm3 Final    RBC 03/28/2024 5.13  4.14 - 5.80 10*6/mm3 Final    Hemoglobin 03/28/2024 16.4  13.0 - 17.7 g/dL Final    Hematocrit 03/28/2024 48.7  37.5 - 51.0 % Final    MCV 03/28/2024 94.9  79.0 - 97.0 fL Final    MCH 03/28/2024 32.0  26.6 - 33.0 pg Final    MCHC 03/28/2024 33.7  31.5 - 35.7 g/dL Final    RDW 03/28/2024 13.9  12.3 - 15.4 % Final    RDW-SD 03/28/2024 49.1  37.0 - 54.0 fl Final    MPV 03/28/2024 9.1  6.0 - 12.0 fL Final    Platelets 03/28/2024 241  140 - 450 10*3/mm3 Final    Neutrophil % 03/28/2024 60.0  42.7 - 76.0 % Final    Lymphocyte % 03/28/2024 25.8  19.6 - 45.3 % Final    Monocyte % 03/28/2024 11.5  5.0 - 12.0 % Final    Eosinophil % 03/28/2024 1.7  0.3 - 6.2 % Final    Basophil % 03/28/2024 0.8  0.0 - 1.5 % Final    Immature Grans % 03/28/2024 0.2  0.0 - 0.5 % Final    Neutrophils, Absolute 03/28/2024 6.36  1.70 - 7.00 10*3/mm3 Final    Lymphocytes, Absolute  03/28/2024 2.74  0.70 - 3.10 10*3/mm3 Final    Monocytes, Absolute 03/28/2024 1.22 (H)  0.10 - 0.90 10*3/mm3 Final    Eosinophils, Absolute 03/28/2024 0.18  0.00 - 0.40 10*3/mm3 Final    Basophils, Absolute 03/28/2024 0.08  0.00 - 0.20 10*3/mm3 Final    Immature Grans, Absolute 03/28/2024 0.02  0.00 - 0.05 10*3/mm3 Final    nRBC 03/28/2024 0.0  0.0 - 0.2 /100 WBC Final    Fentanyl, Urine 03/28/2024 Negative  Negative Final    RBC, UA 03/28/2024 0-2  None Seen, 0-2 /HPF Final    WBC, UA 03/28/2024 3-5 (A)  None Seen, 0-2 /HPF Final    Bacteria, UA 03/28/2024 Trace (A)  None Seen /HPF Final    Squamous Epithelial Cells, UA 03/28/2024 0-2  None Seen, 0-2 /HPF Final    Hyaline Casts, UA 03/28/2024 None Seen  None Seen /LPF Final    Mucus, UA 03/28/2024 Moderate/2+ (A)  None Seen, Trace /HPF Final    Methodology 03/28/2024 Automated Microscopy    Final    QT Interval 03/28/2024 364  ms Final    QTC Interval 03/28/2024 462  ms Final         Condition on Discharge:  improved     Vital Signs  Temp:  [96.9 °F (36.1 °C)-98.3 °F (36.8 °C)] 98.3 °F (36.8 °C)  Heart Rate:  [74-96] 86  Resp:  [16-18] 16  BP: (105-155)/(60-90) 113/69        Discharge Disposition:  Rehab Facility or Unit (DC - External)     Discharge Medications:      Discharge Medications          Continue These Medications         Instructions Start Date   albuterol sulfate  (90 Base) MCG/ACT inhaler  Commonly known as: PROVENTIL HFA;VENTOLIN HFA;PROAIR HFA    2 puffs, Inhalation, Every 4 Hours PRN        busPIRone 10 MG tablet  Commonly known as: BUSPAR    10 mg, Oral, 2 Times Daily        DULoxetine 60 MG capsule  Commonly known as: CYMBALTA    60 mg, Oral, Daily        Fluticasone-Salmeterol 250-50 MCG/ACT DISKUS  Commonly known as: ADVAIR/WIXELA    1 puff, Inhalation, 2 Times Daily - RT        folic acid 1 MG tablet  Commonly known as: FOLVITE    1 mg, Oral, Daily        lisinopril 20 MG tablet  Commonly known as: PRINIVIL,ZESTRIL    20 mg, Oral,  Daily        magnesium oxide 400 MG tablet  Commonly known as: MAG-OX    400 mg, Oral, Daily        multivitamin tablet tablet    1 tablet, Oral, Daily        pantoprazole 40 MG EC tablet  Commonly known as: PROTONIX    40 mg, Oral, Daily        prazosin 2 MG capsule  Commonly known as: MINIPRESS    2 mg, Oral, Nightly        QUEtiapine 50 MG tablet  Commonly known as: SEROquel    50 mg, Oral, 3 Times Daily        Spiriva Respimat 1.25 MCG/ACT aerosol solution inhaler  Generic drug: Tiotropium Bromide Monohydrate    2 puffs, Inhalation, Daily - RT        thiamine 100 MG tablet  tablet  Commonly known as: VITAMIN B-1    100 mg, Oral, Daily        Vraylar 1.5 MG capsule capsule  Generic drug: Cariprazine HCl    1.5 mg, Oral, Daily                  Stop These Medications       acetaminophen 650 MG 8 hr tablet  Commonly known as: TYLENOL      amitriptyline 50 MG tablet  Commonly known as: ELAVIL      methocarbamol 500 MG tablet  Commonly known as: ROBAXIN      Narcan 4 MG/0.1ML nasal spray  Generic drug: naloxone                   Discharge Diet: Regular      Activity at Discharge: As tolerated      Follow-up Appointments  [facility] [place]        Time: I spent  >30  minutes on this discharge activity which included: face-to-face encounter with the patient, reviewing the data in the system, coordination of the care with the nursing staff as well as consultants, documentation, and entering orders.          Clinician:   Tj Smilye MD  04/04/24  15:44 EDT